# Patient Record
Sex: FEMALE | Race: WHITE | HISPANIC OR LATINO | Employment: OTHER | URBAN - METROPOLITAN AREA
[De-identification: names, ages, dates, MRNs, and addresses within clinical notes are randomized per-mention and may not be internally consistent; named-entity substitution may affect disease eponyms.]

---

## 2020-11-03 PROBLEM — E66.01 MORBID OBESITY: Status: ACTIVE | Noted: 2020-11-03

## 2021-02-04 PROBLEM — F41.1 GAD (GENERALIZED ANXIETY DISORDER): Status: ACTIVE | Noted: 2021-02-04

## 2021-02-04 PROBLEM — E03.9 HYPOTHYROIDISM: Status: ACTIVE | Noted: 2021-02-04

## 2021-02-04 PROBLEM — I25.10 CORONARY ARTERY DISEASE INVOLVING NATIVE CORONARY ARTERY OF NATIVE HEART WITHOUT ANGINA PECTORIS: Status: ACTIVE | Noted: 2021-02-04

## 2021-02-04 PROBLEM — M85.80 OSTEOPENIA: Status: ACTIVE | Noted: 2021-02-04

## 2021-02-04 PROBLEM — E78.5 DYSLIPIDEMIA: Status: ACTIVE | Noted: 2021-02-04

## 2021-06-23 DIAGNOSIS — M25.562 LEFT KNEE PAIN, UNSPECIFIED CHRONICITY: Primary | ICD-10-CM

## 2021-06-24 ENCOUNTER — OFFICE VISIT (OUTPATIENT)
Dept: ORTHOPEDICS | Facility: CLINIC | Age: 66
End: 2021-06-24
Payer: MEDICARE

## 2021-06-24 ENCOUNTER — HOSPITAL ENCOUNTER (OUTPATIENT)
Dept: RADIOLOGY | Facility: HOSPITAL | Age: 66
Discharge: HOME | End: 2021-06-24
Attending: ORTHOPAEDIC SURGERY
Payer: MEDICARE

## 2021-06-24 VITALS — HEIGHT: 64 IN | WEIGHT: 253 LBS | BODY MASS INDEX: 43.19 KG/M2

## 2021-06-24 DIAGNOSIS — M25.562 LEFT KNEE PAIN, UNSPECIFIED CHRONICITY: ICD-10-CM

## 2021-06-24 DIAGNOSIS — M25.361 KNEE INSTABILITY, RIGHT: ICD-10-CM

## 2021-06-24 DIAGNOSIS — M17.12 PRIMARY OSTEOARTHRITIS OF LEFT KNEE: Primary | ICD-10-CM

## 2021-06-24 PROCEDURE — 73562 X-RAY EXAM OF KNEE 3: CPT | Mod: 26,LT,, | Performed by: RADIOLOGY

## 2021-06-24 PROCEDURE — 73562 XR KNEE ORTHO LEFT: ICD-10-PCS | Mod: 26,LT,, | Performed by: RADIOLOGY

## 2021-06-24 PROCEDURE — 73560 X-RAY EXAM OF KNEE 1 OR 2: CPT | Mod: 26,RT,, | Performed by: RADIOLOGY

## 2021-06-24 PROCEDURE — 99204 OFFICE O/P NEW MOD 45 MIN: CPT | Mod: S$PBB,25,, | Performed by: ORTHOPAEDIC SURGERY

## 2021-06-24 PROCEDURE — 99204 PR OFFICE/OUTPT VISIT, NEW, LEVL IV, 45-59 MIN: ICD-10-PCS | Mod: S$PBB,25,, | Performed by: ORTHOPAEDIC SURGERY

## 2021-06-24 PROCEDURE — 73560 X-RAY EXAM OF KNEE 1 OR 2: CPT | Mod: TC,RT,59,PO

## 2021-06-24 PROCEDURE — 99999 PR PBB SHADOW E&M-EST. PATIENT-LVL III: ICD-10-PCS | Mod: PBBFAC,,, | Performed by: ORTHOPAEDIC SURGERY

## 2021-06-24 PROCEDURE — 99213 OFFICE O/P EST LOW 20 MIN: CPT | Mod: PBBFAC,25,PN | Performed by: ORTHOPAEDIC SURGERY

## 2021-06-24 PROCEDURE — 73560 XR KNEE ORTHO LEFT: ICD-10-PCS | Mod: 26,RT,, | Performed by: RADIOLOGY

## 2021-06-24 PROCEDURE — 99999 PR PBB SHADOW E&M-EST. PATIENT-LVL III: CPT | Mod: PBBFAC,,, | Performed by: ORTHOPAEDIC SURGERY

## 2021-06-24 PROCEDURE — 20610 DRAIN/INJ JOINT/BURSA W/O US: CPT | Mod: PBBFAC,PN | Performed by: ORTHOPAEDIC SURGERY

## 2021-06-24 PROCEDURE — 20610 LARGE JOINT ASPIRATION/INJECTION: L KNEE: ICD-10-PCS | Mod: S$PBB,LT,, | Performed by: ORTHOPAEDIC SURGERY

## 2021-06-24 RX ORDER — TRIAMCINOLONE ACETONIDE 40 MG/ML
40 INJECTION, SUSPENSION INTRA-ARTICULAR; INTRAMUSCULAR
Status: DISCONTINUED | OUTPATIENT
Start: 2021-06-24 | End: 2021-06-24 | Stop reason: HOSPADM

## 2021-06-24 RX ADMIN — TRIAMCINOLONE ACETONIDE 40 MG: 40 INJECTION, SUSPENSION INTRA-ARTICULAR; INTRAMUSCULAR at 01:06

## 2021-10-21 ENCOUNTER — OFFICE VISIT (OUTPATIENT)
Dept: CARDIOLOGY | Facility: CLINIC | Age: 66
End: 2021-10-21
Payer: MEDICARE

## 2021-10-21 VITALS
BODY MASS INDEX: 46.17 KG/M2 | SYSTOLIC BLOOD PRESSURE: 141 MMHG | WEIGHT: 268.94 LBS | DIASTOLIC BLOOD PRESSURE: 80 MMHG | HEART RATE: 67 BPM

## 2021-10-21 DIAGNOSIS — E66.01 MORBID OBESITY: ICD-10-CM

## 2021-10-21 DIAGNOSIS — I25.10 CORONARY ARTERY DISEASE INVOLVING NATIVE CORONARY ARTERY OF NATIVE HEART WITHOUT ANGINA PECTORIS: ICD-10-CM

## 2021-10-21 DIAGNOSIS — I20.9 ANGINA PECTORIS: Primary | ICD-10-CM

## 2021-10-21 DIAGNOSIS — I10 PRIMARY HYPERTENSION: ICD-10-CM

## 2021-10-21 DIAGNOSIS — E78.5 HYPERLIPIDEMIA, UNSPECIFIED HYPERLIPIDEMIA TYPE: ICD-10-CM

## 2021-10-21 PROCEDURE — 99204 OFFICE O/P NEW MOD 45 MIN: CPT | Mod: S$PBB,25,, | Performed by: INTERNAL MEDICINE

## 2021-10-21 PROCEDURE — 93005 ELECTROCARDIOGRAM TRACING: CPT | Mod: PO

## 2021-10-21 PROCEDURE — 99999 PR PBB SHADOW E&M-EST. PATIENT-LVL IV: ICD-10-PCS | Mod: PBBFAC,,, | Performed by: INTERNAL MEDICINE

## 2021-10-21 PROCEDURE — 99214 OFFICE O/P EST MOD 30 MIN: CPT | Mod: PBBFAC,PO | Performed by: INTERNAL MEDICINE

## 2021-10-21 PROCEDURE — 99999 PR PBB SHADOW E&M-EST. PATIENT-LVL IV: CPT | Mod: PBBFAC,,, | Performed by: INTERNAL MEDICINE

## 2021-10-21 PROCEDURE — 99204 PR OFFICE/OUTPT VISIT, NEW, LEVL IV, 45-59 MIN: ICD-10-PCS | Mod: S$PBB,25,, | Performed by: INTERNAL MEDICINE

## 2021-10-21 PROCEDURE — 93010 ELECTROCARDIOGRAM REPORT: CPT | Mod: ,,, | Performed by: INTERNAL MEDICINE

## 2021-10-21 PROCEDURE — 93010 EKG 12-LEAD: ICD-10-PCS | Mod: ,,, | Performed by: INTERNAL MEDICINE

## 2021-10-21 RX ORDER — NITROGLYCERIN 400 UG/1
2 SPRAY ORAL EVERY 5 MIN PRN
Qty: 0.004 G | Refills: 12 | Status: SHIPPED | OUTPATIENT
Start: 2021-10-21 | End: 2023-05-10 | Stop reason: SDUPTHER

## 2021-10-28 ENCOUNTER — HOSPITAL ENCOUNTER (OUTPATIENT)
Dept: RADIOLOGY | Facility: HOSPITAL | Age: 66
Discharge: HOME | End: 2021-10-28
Attending: INTERNAL MEDICINE
Payer: MEDICARE

## 2021-10-28 ENCOUNTER — CLINICAL SUPPORT (OUTPATIENT)
Dept: CARDIOLOGY | Facility: HOSPITAL | Age: 66
End: 2021-10-28
Attending: INTERNAL MEDICINE
Payer: MEDICARE

## 2021-10-28 VITALS — HEIGHT: 64 IN | WEIGHT: 268 LBS | BODY MASS INDEX: 45.75 KG/M2

## 2021-10-28 DIAGNOSIS — I20.9 ANGINA PECTORIS: ICD-10-CM

## 2021-10-28 LAB
CV PHARM DOSE: 0.4 MG
CV STRESS BASE HR: 69 BPM
DIASTOLIC BLOOD PRESSURE: 113 MMHG
OHS CV CPX 1 MINUTE RECOVERY HEART RATE: 84 BPM
OHS CV CPX 85 PERCENT MAX PREDICTED HEART RATE MALE: 126
OHS CV CPX MAX PREDICTED HEART RATE: 148
OHS CV CPX PATIENT IS FEMALE: 1
OHS CV CPX PATIENT IS MALE: 0
OHS CV CPX PEAK DIASTOLIC BLOOD PRESSURE: 67 MMHG
OHS CV CPX PEAK HEAR RATE: 95 BPM
OHS CV CPX PEAK RATE PRESSURE PRODUCT: NORMAL
OHS CV CPX PEAK SYSTOLIC BLOOD PRESSURE: 194 MMHG
OHS CV CPX PERCENT MAX PREDICTED HEART RATE ACHIEVED: 64
OHS CV CPX RATE PRESSURE PRODUCT PRESENTING: NORMAL
OHS CV PHARM TIME: 952 MIN
SYSTOLIC BLOOD PRESSURE: 184 MMHG

## 2021-10-28 PROCEDURE — 93018 CV STRESS TEST I&R ONLY: CPT | Mod: ,,, | Performed by: INTERNAL MEDICINE

## 2021-10-28 PROCEDURE — A9502 TC99M TETROFOSMIN: HCPCS | Mod: PO

## 2021-10-28 PROCEDURE — 78452 STRESS TEST WITH MYOCARDIAL PERFUSION (CUPID ONLY): ICD-10-PCS | Mod: 26,,, | Performed by: INTERNAL MEDICINE

## 2021-10-28 PROCEDURE — 93017 CV STRESS TEST TRACING ONLY: CPT | Mod: PO

## 2021-10-28 PROCEDURE — 93018 PR CARDIAC STRESS TST,INTERP/REPT ONLY: ICD-10-PCS | Mod: ,,, | Performed by: INTERNAL MEDICINE

## 2021-10-28 PROCEDURE — 93016 STRESS TEST WITH MYOCARDIAL PERFUSION (CUPID ONLY): ICD-10-PCS | Mod: ,,, | Performed by: INTERNAL MEDICINE

## 2021-10-28 PROCEDURE — 63600175 PHARM REV CODE 636 W HCPCS: Mod: PO | Performed by: INTERNAL MEDICINE

## 2021-10-28 PROCEDURE — 78452 HT MUSCLE IMAGE SPECT MULT: CPT | Mod: PO

## 2021-10-28 PROCEDURE — 78452 HT MUSCLE IMAGE SPECT MULT: CPT | Mod: 26,,, | Performed by: INTERNAL MEDICINE

## 2021-10-28 PROCEDURE — 93016 CV STRESS TEST SUPVJ ONLY: CPT | Mod: ,,, | Performed by: INTERNAL MEDICINE

## 2021-10-28 RX ORDER — REGADENOSON 0.08 MG/ML
0.4 INJECTION, SOLUTION INTRAVENOUS ONCE
Status: COMPLETED | OUTPATIENT
Start: 2021-10-28 | End: 2021-10-28

## 2021-10-28 RX ADMIN — REGADENOSON 0.4 MG: 0.08 INJECTION, SOLUTION INTRAVENOUS at 09:10

## 2021-10-29 ENCOUNTER — TELEPHONE (OUTPATIENT)
Dept: CARDIOLOGY | Facility: CLINIC | Age: 66
End: 2021-10-29
Payer: MEDICARE

## 2021-10-29 ENCOUNTER — PATIENT MESSAGE (OUTPATIENT)
Dept: CARDIOLOGY | Facility: CLINIC | Age: 66
End: 2021-10-29
Payer: MEDICARE

## 2021-11-21 ENCOUNTER — PATIENT MESSAGE (OUTPATIENT)
Dept: CARDIOLOGY | Facility: CLINIC | Age: 66
End: 2021-11-21
Payer: MEDICARE

## 2021-12-13 ENCOUNTER — CLINICAL SUPPORT (OUTPATIENT)
Dept: CARDIOLOGY | Facility: HOSPITAL | Age: 66
End: 2021-12-13
Attending: INTERNAL MEDICINE
Payer: MEDICARE

## 2021-12-13 VITALS — BODY MASS INDEX: 45.75 KG/M2 | HEART RATE: 70 BPM | WEIGHT: 268 LBS | HEIGHT: 64 IN

## 2021-12-13 PROCEDURE — 93306 TTE W/DOPPLER COMPLETE: CPT | Mod: 26,,, | Performed by: INTERNAL MEDICINE

## 2021-12-13 PROCEDURE — 93306 ECHO (CUPID ONLY): ICD-10-PCS | Mod: 26,,, | Performed by: INTERNAL MEDICINE

## 2021-12-13 PROCEDURE — 93306 TTE W/DOPPLER COMPLETE: CPT | Mod: PO

## 2021-12-14 ENCOUNTER — TELEPHONE (OUTPATIENT)
Dept: CARDIOLOGY | Facility: CLINIC | Age: 66
End: 2021-12-14
Payer: MEDICARE

## 2021-12-14 LAB
ASCENDING AORTA: 2.11 CM
AV INDEX (PROSTH): 0.43
AV MEAN GRADIENT: 12 MMHG
AV PEAK GRADIENT: 24 MMHG
AV VALVE AREA: 1.33 CM2
AV VELOCITY RATIO: 0.4
BSA FOR ECHO PROCEDURE: 2.34 M2
CV ECHO LV RWT: 0.38 CM
DOP CALC AO PEAK VEL: 2.47 M/S
DOP CALC AO VTI: 64.11 CM
DOP CALC LVOT AREA: 3.1 CM2
DOP CALC LVOT DIAMETER: 1.99 CM
DOP CALC LVOT PEAK VEL: 1 M/S
DOP CALC LVOT STROKE VOLUME: 85.15 CM3
DOP CALCLVOT PEAK VEL VTI: 27.39 CM
E WAVE DECELERATION TIME: 237.82 MSEC
E/A RATIO: 1.34
E/E' RATIO: 16.63 M/S
ECHO LV POSTERIOR WALL: 0.81 CM (ref 0.6–1.1)
EJECTION FRACTION: 70 %
FRACTIONAL SHORTENING: 46 % (ref 28–44)
INTERVENTRICULAR SEPTUM: 0.82 CM (ref 0.6–1.1)
IVRT: 117.03 MSEC
LA MAJOR: 4.71 CM
LA MINOR: 4.68 CM
LA WIDTH: 3.44 CM
LEFT ATRIUM SIZE: 3.41 CM
LEFT ATRIUM VOLUME INDEX: 21.1 ML/M2
LEFT ATRIUM VOLUME: 46.81 CM3
LEFT INTERNAL DIMENSION IN SYSTOLE: 2.32 CM (ref 2.1–4)
LEFT VENTRICLE DIASTOLIC VOLUME INDEX: 37.17 ML/M2
LEFT VENTRICLE DIASTOLIC VOLUME: 82.52 ML
LEFT VENTRICLE MASS INDEX: 48 G/M2
LEFT VENTRICLE SYSTOLIC VOLUME INDEX: 8.4 ML/M2
LEFT VENTRICLE SYSTOLIC VOLUME: 18.59 ML
LEFT VENTRICULAR INTERNAL DIMENSION IN DIASTOLE: 4.29 CM (ref 3.5–6)
LEFT VENTRICULAR MASS: 107.53 G
LV LATERAL E/E' RATIO: 14.78 M/S
LV SEPTAL E/E' RATIO: 19 M/S
MV A" WAVE DURATION": 11.7 MSEC
MV PEAK A VEL: 0.99 M/S
MV PEAK E VEL: 1.33 M/S
MV STENOSIS PRESSURE HALF TIME: 68.97 MS
MV VALVE AREA P 1/2 METHOD: 3.19 CM2
PISA MRMAX VEL: 0.05 M/S
PISA TR MAX VEL: 2.89 M/S
PULM VEIN S/D RATIO: 0.97
PV PEAK D VEL: 0.69 M/S
PV PEAK S VEL: 0.67 M/S
RA MAJOR: 4.22 CM
RA PRESSURE: 3 MMHG
RA WIDTH: 2.76 CM
RIGHT VENTRICULAR END-DIASTOLIC DIMENSION: 3.48 CM
RV TISSUE DOPPLER FREE WALL SYSTOLIC VELOCITY 1 (APICAL 4 CHAMBER VIEW): 8.66 CM/S
SINUS: 2.25 CM
STJ: 1.84 CM
TDI LATERAL: 0.09 M/S
TDI SEPTAL: 0.07 M/S
TDI: 0.08 M/S
TR MAX PG: 33 MMHG
TRICUSPID ANNULAR PLANE SYSTOLIC EXCURSION: 1.99 CM
TV REST PULMONARY ARTERY PRESSURE: 36 MMHG

## 2022-01-10 DIAGNOSIS — M25.551 RIGHT HIP PAIN: Primary | ICD-10-CM

## 2022-01-12 ENCOUNTER — TELEPHONE (OUTPATIENT)
Dept: ORTHOPEDICS | Facility: CLINIC | Age: 67
End: 2022-01-12
Payer: MEDICARE

## 2022-01-12 ENCOUNTER — OFFICE VISIT (OUTPATIENT)
Dept: ORTHOPEDICS | Facility: CLINIC | Age: 67
End: 2022-01-12
Payer: MEDICARE

## 2022-01-12 ENCOUNTER — HOSPITAL ENCOUNTER (OUTPATIENT)
Dept: RADIOLOGY | Facility: HOSPITAL | Age: 67
Discharge: HOME | End: 2022-01-12
Attending: ORTHOPAEDIC SURGERY
Payer: MEDICARE

## 2022-01-12 VITALS
SYSTOLIC BLOOD PRESSURE: 162 MMHG | HEIGHT: 64 IN | WEIGHT: 268.06 LBS | DIASTOLIC BLOOD PRESSURE: 83 MMHG | HEART RATE: 72 BPM | BODY MASS INDEX: 45.76 KG/M2

## 2022-01-12 DIAGNOSIS — M18.0 PRIMARY ARTHROSIS OF FIRST CARPOMETACARPAL JOINTS, BILATERAL: Primary | ICD-10-CM

## 2022-01-12 DIAGNOSIS — M79.642 LEFT HAND PAIN: ICD-10-CM

## 2022-01-12 DIAGNOSIS — M79.642 LEFT HAND PAIN: Primary | ICD-10-CM

## 2022-01-12 PROCEDURE — 99204 PR OFFICE/OUTPT VISIT, NEW, LEVL IV, 45-59 MIN: ICD-10-PCS | Mod: 25,S$PBB,, | Performed by: ORTHOPAEDIC SURGERY

## 2022-01-12 PROCEDURE — 73130 X-RAY EXAM OF HAND: CPT | Mod: TC,50,PO

## 2022-01-12 PROCEDURE — 99204 OFFICE O/P NEW MOD 45 MIN: CPT | Mod: 25,S$PBB,, | Performed by: ORTHOPAEDIC SURGERY

## 2022-01-12 PROCEDURE — 99214 OFFICE O/P EST MOD 30 MIN: CPT | Mod: PBBFAC,PN,25 | Performed by: ORTHOPAEDIC SURGERY

## 2022-01-12 PROCEDURE — 20600 DRAIN/INJ JOINT/BURSA W/O US: CPT | Mod: PBBFAC,50 | Performed by: ORTHOPAEDIC SURGERY

## 2022-01-12 PROCEDURE — 73130 XR HAND COMPLETE 3 VIEWS BILATERAL: ICD-10-PCS | Mod: 26,50,, | Performed by: RADIOLOGY

## 2022-01-12 PROCEDURE — 20600 PR DRAIN/INJECT SMALL JOINT/BURSA: ICD-10-PCS | Mod: 50,S$PBB,, | Performed by: ORTHOPAEDIC SURGERY

## 2022-01-12 PROCEDURE — 99999 PR PBB SHADOW E&M-EST. PATIENT-LVL IV: ICD-10-PCS | Mod: PBBFAC,,, | Performed by: ORTHOPAEDIC SURGERY

## 2022-01-12 PROCEDURE — 99999 PR PBB SHADOW E&M-EST. PATIENT-LVL IV: CPT | Mod: PBBFAC,,, | Performed by: ORTHOPAEDIC SURGERY

## 2022-01-12 PROCEDURE — 20600 DRAIN/INJ JOINT/BURSA W/O US: CPT | Mod: 50,S$PBB,, | Performed by: ORTHOPAEDIC SURGERY

## 2022-01-12 PROCEDURE — 73130 X-RAY EXAM OF HAND: CPT | Mod: 26,50,, | Performed by: RADIOLOGY

## 2022-01-12 RX ORDER — TRIAMCINOLONE ACETONIDE 40 MG/ML
40 INJECTION, SUSPENSION INTRA-ARTICULAR; INTRAMUSCULAR
Status: DISCONTINUED | OUTPATIENT
Start: 2022-01-12 | End: 2022-01-12 | Stop reason: HOSPADM

## 2022-01-12 RX ADMIN — TRIAMCINOLONE ACETONIDE 40 MG: 40 INJECTION, SUSPENSION INTRA-ARTICULAR; INTRAMUSCULAR at 04:01

## 2022-01-12 NOTE — TELEPHONE ENCOUNTER
Called pt to reschedule 2pm appt with Jules because he will be out of clinic. Can reschedule with  today at 4:40 or reschedule to Yo next available appt. No answer. LVM. Thanks, Karla

## 2022-01-12 NOTE — PROCEDURES
Small Joint Aspiration/Injection    Date/Time: 1/12/2022 4:40 PM  Performed by: Phoenix Stauffer MD  Authorized by: Phoenix Stauffer MD     Consent Done?:  Yes (Verbal)  Indications:  Pain  Site marked: the procedure site was marked    Timeout: prior to procedure the correct patient, procedure, and site was verified    Prep: patient was prepped and draped in usual sterile fashion      Local anesthesia used?: No    Location:  Thumb  Thumb joint: Right thumb CMC joint.  Ultrasonic guidance for needle placement?: No    Medications:  40 mg triamcinolone acetonide 40 mg/mL  Patient tolerance:  Patient tolerated the procedure well with no immediate complications

## 2022-01-12 NOTE — PROCEDURES
Small Joint Aspiration/Injection    Date/Time: 1/12/2022 4:40 PM  Performed by: Phoenix Stauffer MD  Authorized by: Phoenix Stauffer MD     Consent Done?:  Yes (Verbal)  Indications:  Pain  Site marked: the procedure site was marked    Timeout: prior to procedure the correct patient, procedure, and site was verified    Local anesthetic:  Topical anesthetic  Location:  Thumb  Thumb joint: Left thumb CMC joint.  Ultrasonic guidance for needle placement?: No    Needle size:  25 G  Medications:  40 mg triamcinolone acetonide 40 mg/mL; 40 mg triamcinolone acetonide 40 mg/mL (20 mg injected)  Patient tolerance:  Patient tolerated the procedure well with no immediate complications

## 2022-01-12 NOTE — PROGRESS NOTES
1/12/2022    Chief Complaint:  Chief Complaint   Patient presents with    Left Hand - Pain     [place] hand pain    Right Hand - Pain       HPI:  Melissa Casas is a 66 y.o. female, who presents to clinic today she has a history of pain at the base of both of her thumbs.  She states that this been going on for months.  She states that it is gradually worsening.  States that she has had some x-rays.  She has been told in the past that she has de Quervain tendinitis.  She is here today for evaluation and treatment options.    PMHX:  Past Medical History:   Diagnosis Date    Anxiety     Asthma     CAD (coronary artery disease)     Chronic pain     Coronary artery disease     Hyperlipidemia     Osteopenia     Pneumonia     Primary hypertension     Primary osteoarthritis of left knee 6/24/2021    Thyroid disease        PSHX:  Past Surgical History:   Procedure Laterality Date    CORONARY ARTERY BYPASS GRAFT      double     JOINT REPLACEMENT Right     total    JOINT REPLACEMENT Left     patital        FMHX:  Family History   Problem Relation Age of Onset    Atrial fibrillation Mother     Thyroid disease Mother     Osteoporosis Mother     Anxiety disorder Mother     Heart disease Father     Anxiety disorder Father     Mental illness Maternal Uncle         schizophrenia     Anxiety disorder Maternal Grandmother     Ulcers Maternal Grandmother     Cancer Paternal Aunt     Diabetes Paternal Aunt        SOCHX:  Social History     Tobacco Use    Smoking status: Never Smoker    Smokeless tobacco: Never Used   Substance Use Topics    Alcohol use: Not Currently       ALLERGIES:  Compazine [prochlorperazine edisylate]    CURRENT MEDICATIONS:  Current Outpatient Medications on File Prior to Visit   Medication Sig Dispense Refill    ALPRAZolam (XANAX) 0.5 MG tablet Take 1 tablet (0.5 mg total) by mouth 2 (two) times daily as needed for Anxiety. 60 tablet 2    ascorbic acid, vitamin C, (VITAMIN C) 500 MG  tablet Take 500 mg by mouth once daily.      atorvastatin (LIPITOR) 80 MG tablet Take 1 tablet (80 mg total) by mouth once daily. 90 tablet 0    calcium carbonate (OS-RAY) 600 mg calcium (1,500 mg) Tab Take 600 mg by mouth once.      cholecalciferol, vitamin D3, 1,250 mcg (50,000 unit) capsule Take 50,000 Units by mouth once daily.      COLLAGEN MISC Take 5,000 mg by mouth 2 (two) times a day.      diclofenac sodium (VOLTAREN) 1 % Gel Apply 2 g topically once daily.      dicyclomine (BENTYL) 10 MG capsule Take 1 capsule (10 mg total) by mouth 3 (three) times daily as needed (cramping). 30 capsule 3    diltiaZEM (CARDIZEM CD) 120 MG Cp24 Take 1 capsule (120 mg total) by mouth once daily. 90 capsule 3    estradioL (VAGIFEM) 10 mcg Tab Place 1 tablet (10 mcg total) vaginally twice a week. 24 tablet 3    fluticasone furoate-vilanteroL (BREO ELLIPTA) 100-25 mcg/dose diskus inhaler Inhale 1 puff into the lungs once daily. Controller 3 each 3    fluticasone propionate (FLONASE) 50 mcg/actuation nasal spray 1 spray (50 mcg total) by Each Nostril route 2 (two) times a day. 48 g 3    levocetirizine (XYZAL) 5 MG tablet Take 5 mg by mouth every evening.      levothyroxine (SYNTHROID) 150 MCG tablet Take 150 mcg by mouth before breakfast.      magnesium oxide 500 mg Tab Take 1 tablet by mouth once daily.       montelukast (SINGULAIR) 10 mg tablet Take 1 tablet (10 mg total) by mouth every evening. 90 tablet 3    multivitamin (THERAGRAN) per tablet Take 1 tablet by mouth once daily.      nitroGLYCERIN 0.4 MG/DOSE TL SPRY (NITROLINGUAL) 400 mcg/spray spray Place 2 sprays under the tongue every 5 (five) minutes as needed for Chest pain. 0.004 g 12    raloxifene (EVISTA) 60 mg tablet Take 1 tablet (60 mg total) by mouth once daily. 90 tablet 3    ramipriL (ALTACE) 5 MG capsule Take 1 capsule (5 mg total) by mouth once daily. 90 capsule 3    ranolazine (RANEXA) 500 MG Tb12 Take 2 tablets (1,000 mg total) by mouth 2  "(two) times a day. 180 tablet 3     No current facility-administered medications on file prior to visit.       REVIEW OF SYSTEMS:  Review of Systems   Musculoskeletal: Positive for joint pain and neck pain.   Neurological: Negative for tingling and weakness.       GENERAL PHYSICAL EXAM:   BP (!) 162/83   Pulse 72   Ht 5' 4" (1.626 m)   Wt 121.6 kg (268 lb 1.3 oz)   BMI 46.02 kg/m²    GEN: well developed, well nourished, no acute distress   HENT: Normocephalic, atraumatic   EYES: No discharge, conjunctiva normal   NECK: Supple, non-tender   PULM: No wheezing, no respiratory distress   CV: RRR   ABD: Soft, non-tender    ORTHO EXAM:   Examination of bilateral hands reveals that there is no edema.  There are no major skin changes.  Palpation produces severe tenderness over the CMC joints bilaterally.  She has positive grind test bilaterally.  She has 2+ radial pulses.  Sensation is grossly intact in the median radial ulnar distributions.    RADIOLOGY:   X-rays of bilateral hands from 01/12/2022 have been reviewed.  She is noted to have severe degenerative changes about the CMC joints of both the right and the left thumbs.  There are mild degenerative changes about multiple other joints    ASSESSMENT:   Bilateral thumb CMC arthritis    PLAN:  1. I did discuss treatment options with the patient.  She states that she does not do well with oral anti-inflammatories.  I have discussed the possibility of steroid injections.  After discussion the risks and benefits of those injections I did place steroid injections to both the right and left thumb CMC joints.  The patient tolerated these well.    2. Follow up with me on a p.r.n. basis  Answers for HPI/ROS submitted by the patient on 1/12/2022  unexpected weight change: No  appetite change : No  sleep disturbance: No  IMMUNOCOMPROMISED: No  dysphoric mood: No  visual disturbance: No  sinus pressure : No  food allergies: No  difficulty urinating: No  painful intercourse: " No  numbness: No  joint swelling: No  Pain Chronicity: chronic  History of trauma: No  Onset: more than 1 month ago  Frequency: constantly  Progression since onset: unchanged  Injury mechanism: lifting  injury location: at home  pain- numeric: 10/10  pain location: left wrist, left fingers, other  pain quality: aching, sharp, shooting  Radiating Pain: No  Aggravating factors: activity, bearing weight, lifting, lying down, touching  inability to bear weight: Yes  joint locking: No  limited range of motion: Yes  stiffness: No  Treatments tried: OTC ointments, OTC pain meds, rest  physical therapy: not tried  Improvement on treatment: no relief

## 2022-01-12 NOTE — TELEPHONE ENCOUNTER
Called pt to reschedule today's appt with Jules. R/s to see  at 4:40pm. Pt verbalized understanding. ThanksKarla

## 2022-01-13 ENCOUNTER — OFFICE VISIT (OUTPATIENT)
Dept: ORTHOPEDICS | Facility: CLINIC | Age: 67
End: 2022-01-13
Payer: MEDICARE

## 2022-01-13 ENCOUNTER — HOSPITAL ENCOUNTER (OUTPATIENT)
Dept: RADIOLOGY | Facility: HOSPITAL | Age: 67
Discharge: HOME | End: 2022-01-13
Attending: ORTHOPAEDIC SURGERY
Payer: MEDICARE

## 2022-01-13 VITALS — HEIGHT: 64 IN | BODY MASS INDEX: 45.75 KG/M2 | WEIGHT: 268 LBS

## 2022-01-13 DIAGNOSIS — M70.61 GREATER TROCHANTERIC BURSITIS OF RIGHT HIP: ICD-10-CM

## 2022-01-13 DIAGNOSIS — M17.12 PRIMARY OSTEOARTHRITIS OF LEFT KNEE: ICD-10-CM

## 2022-01-13 DIAGNOSIS — M17.11 PRIMARY OSTEOARTHRITIS OF RIGHT KNEE: Primary | ICD-10-CM

## 2022-01-13 DIAGNOSIS — M25.551 RIGHT HIP PAIN: ICD-10-CM

## 2022-01-13 PROCEDURE — 99213 OFFICE O/P EST LOW 20 MIN: CPT | Mod: S$PBB,25,, | Performed by: ORTHOPAEDIC SURGERY

## 2022-01-13 PROCEDURE — 99999 PR PBB SHADOW E&M-EST. PATIENT-LVL III: ICD-10-PCS | Mod: PBBFAC,,, | Performed by: ORTHOPAEDIC SURGERY

## 2022-01-13 PROCEDURE — 73502 XR HIP WITH PELVIS WHEN PERFORMED, 2 OR 3  VIEWS RIGHT: ICD-10-PCS | Mod: 26,RT,, | Performed by: RADIOLOGY

## 2022-01-13 PROCEDURE — 99999 PR PBB SHADOW E&M-EST. PATIENT-LVL III: CPT | Mod: PBBFAC,,, | Performed by: ORTHOPAEDIC SURGERY

## 2022-01-13 PROCEDURE — 20610 DRAIN/INJ JOINT/BURSA W/O US: CPT | Mod: 50,PBBFAC,PN | Performed by: ORTHOPAEDIC SURGERY

## 2022-01-13 PROCEDURE — 99213 OFFICE O/P EST LOW 20 MIN: CPT | Mod: PBBFAC,PN,25 | Performed by: ORTHOPAEDIC SURGERY

## 2022-01-13 PROCEDURE — 20610 DRAIN/INJ JOINT/BURSA W/O US: CPT | Mod: PBBFAC,PN,RT | Performed by: ORTHOPAEDIC SURGERY

## 2022-01-13 PROCEDURE — 99213 PR OFFICE/OUTPT VISIT, EST, LEVL III, 20-29 MIN: ICD-10-PCS | Mod: S$PBB,25,, | Performed by: ORTHOPAEDIC SURGERY

## 2022-01-13 PROCEDURE — 73502 X-RAY EXAM HIP UNI 2-3 VIEWS: CPT | Mod: TC,PO,RT

## 2022-01-13 PROCEDURE — 73502 X-RAY EXAM HIP UNI 2-3 VIEWS: CPT | Mod: 26,RT,, | Performed by: RADIOLOGY

## 2022-01-13 PROCEDURE — 20610 LARGE JOINT ASPIRATION/INJECTION: R GREATER TROCHANTERIC BURSA: ICD-10-PCS | Mod: S$PBB,51,RT, | Performed by: ORTHOPAEDIC SURGERY

## 2022-01-13 RX ORDER — TRIAMCINOLONE ACETONIDE 40 MG/ML
40 INJECTION, SUSPENSION INTRA-ARTICULAR; INTRAMUSCULAR
Status: DISCONTINUED | OUTPATIENT
Start: 2022-01-13 | End: 2022-01-13 | Stop reason: HOSPADM

## 2022-01-13 RX ADMIN — TRIAMCINOLONE ACETONIDE 40 MG: 40 INJECTION, SUSPENSION INTRA-ARTICULAR; INTRAMUSCULAR at 01:01

## 2022-01-13 NOTE — PROCEDURES
Large Joint Aspiration/Injection: bilateral knee    Date/Time: 1/13/2022 1:00 PM  Performed by: Baljit Cooley MD  Authorized by: Baljit Cooley MD     Consent Done?:  Yes (Verbal)  Indications:  Pain  Timeout: prior to procedure the correct patient, procedure, and site was verified    Prep: patient was prepped and draped in usual sterile fashion    Local anesthetic:  Lidocaine 1% without epinephrine  Anesthetic total (ml):  5      Details:  Needle Size:  21 G  Approach:  Anterolateral  Location:  Knee  Laterality:  Bilateral  Site:  Bilateral knee  Medications (Right):  40 mg triamcinolone acetonide 40 mg/mL  Medications (Left):  40 mg triamcinolone acetonide 40 mg/mL  Patient tolerance:  Patient tolerated the procedure well with no immediate complications  Large Joint Aspiration/Injection: R greater trochanteric bursa    Date/Time: 1/13/2022 1:00 PM  Performed by: Baljit Cooley MD  Authorized by: Baljit Cooley MD     Consent Done?:  Yes (Verbal)  Indications:  Pain  Timeout: prior to procedure the correct patient, procedure, and site was verified    Prep: patient was prepped and draped in usual sterile fashion      Local anesthesia used?: Yes    Local anesthetic:  Lidocaine 1% without epinephrine  Anesthetic total (ml):  5      Details:  Needle Size:  22 G  Approach:  Lateral  Location:  Hip  Site:  R greater trochanteric bursa  Medications:  40 mg triamcinolone acetonide 40 mg/mL  Patient tolerance:  Patient tolerated the procedure well with no immediate complications

## 2022-01-13 NOTE — PROGRESS NOTES
Chief Complaint   Patient presents with    Right Hip - Pain         HPI:   This is a 66 y.o. who presents to clinic today complaining of bilateral knee and right hip pain for 3 weeks after no known trauma. Pain is progressively worsening. No numbness or tingling. No associated signs or symptoms.    Past Medical History:   Diagnosis Date    Anxiety     Asthma     CAD (coronary artery disease)     Chronic pain     Coronary artery disease     Hyperlipidemia     Osteopenia     Pneumonia     Primary hypertension     Primary osteoarthritis of left knee 6/24/2021    Thyroid disease      Past Surgical History:   Procedure Laterality Date    CORONARY ARTERY BYPASS GRAFT      double     JOINT REPLACEMENT Right     total    JOINT REPLACEMENT Left     patital      Current Outpatient Medications on File Prior to Visit   Medication Sig Dispense Refill    ALPRAZolam (XANAX) 0.5 MG tablet Take 1 tablet (0.5 mg total) by mouth 2 (two) times daily as needed for Anxiety. 60 tablet 2    ascorbic acid, vitamin C, (VITAMIN C) 500 MG tablet Take 500 mg by mouth once daily.      atorvastatin (LIPITOR) 80 MG tablet Take 1 tablet (80 mg total) by mouth once daily. 90 tablet 0    calcium carbonate (OS-RAY) 600 mg calcium (1,500 mg) Tab Take 600 mg by mouth once.      cholecalciferol, vitamin D3, 1,250 mcg (50,000 unit) capsule Take 50,000 Units by mouth once daily.      COLLAGEN MISC Take 5,000 mg by mouth 2 (two) times a day.      diclofenac sodium (VOLTAREN) 1 % Gel Apply 2 g topically once daily.      dicyclomine (BENTYL) 10 MG capsule Take 1 capsule (10 mg total) by mouth 3 (three) times daily as needed (cramping). 30 capsule 3    diltiaZEM (CARDIZEM CD) 120 MG Cp24 Take 1 capsule (120 mg total) by mouth once daily. 90 capsule 3    estradioL (VAGIFEM) 10 mcg Tab Place 1 tablet (10 mcg total) vaginally twice a week. 24 tablet 3    fluticasone furoate-vilanteroL (BREO ELLIPTA) 100-25 mcg/dose diskus inhaler Inhale  1 puff into the lungs once daily. Controller 3 each 3    fluticasone propionate (FLONASE) 50 mcg/actuation nasal spray 1 spray (50 mcg total) by Each Nostril route 2 (two) times a day. 48 g 3    levocetirizine (XYZAL) 5 MG tablet Take 5 mg by mouth every evening.      levothyroxine (SYNTHROID) 150 MCG tablet Take 150 mcg by mouth before breakfast.      magnesium oxide 500 mg Tab Take 1 tablet by mouth once daily.       montelukast (SINGULAIR) 10 mg tablet Take 1 tablet (10 mg total) by mouth every evening. 90 tablet 3    multivitamin (THERAGRAN) per tablet Take 1 tablet by mouth once daily.      nitroGLYCERIN 0.4 MG/DOSE TL SPRY (NITROLINGUAL) 400 mcg/spray spray Place 2 sprays under the tongue every 5 (five) minutes as needed for Chest pain. 0.004 g 12    raloxifene (EVISTA) 60 mg tablet Take 1 tablet (60 mg total) by mouth once daily. 90 tablet 3    ramipriL (ALTACE) 5 MG capsule Take 1 capsule (5 mg total) by mouth once daily. 90 capsule 3    ranolazine (RANEXA) 500 MG Tb12 Take 2 tablets (1,000 mg total) by mouth 2 (two) times a day. 180 tablet 3     Current Facility-Administered Medications on File Prior to Visit   Medication Dose Route Frequency Provider Last Rate Last Admin    [DISCONTINUED] triamcinolone acetonide injection 40 mg  40 mg Intramuscular  Phoenix Stauffer MD   40 mg at 01/12/22 1640    [DISCONTINUED] triamcinolone acetonide injection 40 mg  40 mg Intramuscular  Phoenix Stauffer MD   40 mg at 01/12/22 1640     Review of patient's allergies indicates:   Allergen Reactions    Compazine [prochlorperazine edisylate]      Family History   Problem Relation Age of Onset    Atrial fibrillation Mother     Thyroid disease Mother     Osteoporosis Mother     Anxiety disorder Mother     Heart disease Father     Anxiety disorder Father     Mental illness Maternal Uncle         schizophrenia     Anxiety disorder Maternal Grandmother     Ulcers Maternal Grandmother     Cancer  Paternal Aunt     Diabetes Paternal Aunt      Social History     Socioeconomic History    Marital status:    Tobacco Use    Smoking status: Never Smoker    Smokeless tobacco: Never Used   Substance and Sexual Activity    Alcohol use: Not Currently       Review of Systems:  Constitutional:  Denies fever or chills   Eyes:  Denies change in visual acuity   HENT:  Denies nasal congestion or sore throat   Respiratory:  Denies cough or shortness of breath   Cardiovascular:  Denies chest pain or edema   GI:  Denies abdominal pain, nausea, vomiting, bloody stools or diarrhea   :  Denies dysuria   Integument:  Denies rash   Neurologic:  Denies headache, focal weakness or sensory changes   Endocrine:  Denies polyuria or polydipsia   Lymphatic:  Denies swollen glands   Psychiatric:  Denies depression or anxiety     Physical Exam:   Constitutional:  Well developed, well nourished, no acute distress, non-toxic appearance   Integument:  Well hydrated, no rash   Lymphatic:  No lymphadenopathy noted   Neurologic:  Alert & oriented x 3, CN 2-12 normal, normal motor function, normal sensory function, no focal deficits noted   Psychiatric:  Speech and behavior appropriate   Eyes: EOMI  Gi: abdomen soft    Bilateral Knee Exam    Tenderness   The patient is experiencing tenderness in the medial joint line.    Range of Motion   Extension: abnormal   Flexion: abnormal     Muscle Strength     The patient has normal knee strength.    Tests   Emilee:  Medial - positive   Lachman:  Anterior - negative      Varus: negative  Valgus: negative  Patellar Apprehension: negative    Other   Erythema: absent  Sensation: normal  Pulse: present  Swelling: mild      Bilateral Hip Exam Performed    right Hip Exam     Tenderness   The patient is experiencing tenderness in the greater trochanter.    Range of Motion   The patient has normal hip ROM.    Muscle Strength   Abduction: 4/5     Other   Erythema: absent  Sensation: normal  Pulse:  present    left Hip Exam   Hip exam performed same as contralateral hip and is normal.   Primary osteoarthritis of right knee    Primary osteoarthritis of left knee    Greater trochanteric bursitis of right hip            Using an aseptic technique, I injected 5 cc of lidocaine 1% without and 1 cc of kenalog 40mg into the bilateral Knee and right hip. The patient tolerated this well. I will have them return to clinic in 3 months.

## 2022-02-24 ENCOUNTER — OFFICE VISIT (OUTPATIENT)
Dept: ORTHOPEDICS | Facility: CLINIC | Age: 67
End: 2022-02-24
Payer: MEDICARE

## 2022-02-24 VITALS — HEIGHT: 64 IN | BODY MASS INDEX: 45.07 KG/M2 | WEIGHT: 264 LBS

## 2022-02-24 DIAGNOSIS — M70.61 GREATER TROCHANTERIC BURSITIS OF RIGHT HIP: ICD-10-CM

## 2022-02-24 DIAGNOSIS — M17.12 PRIMARY OSTEOARTHRITIS OF LEFT KNEE: ICD-10-CM

## 2022-02-24 DIAGNOSIS — M17.11 PRIMARY OSTEOARTHRITIS OF RIGHT KNEE: Primary | ICD-10-CM

## 2022-02-24 PROCEDURE — 99999 PR PBB SHADOW E&M-EST. PATIENT-LVL III: CPT | Mod: PBBFAC,,, | Performed by: ORTHOPAEDIC SURGERY

## 2022-02-24 PROCEDURE — 99214 OFFICE O/P EST MOD 30 MIN: CPT | Mod: 25,S$PBB,, | Performed by: ORTHOPAEDIC SURGERY

## 2022-02-24 PROCEDURE — 20610 LARGE JOINT ASPIRATION/INJECTION: R GREATER TROCHANTERIC BURSA: ICD-10-PCS | Mod: S$PBB,RT,, | Performed by: ORTHOPAEDIC SURGERY

## 2022-02-24 PROCEDURE — 99214 PR OFFICE/OUTPT VISIT, EST, LEVL IV, 30-39 MIN: ICD-10-PCS | Mod: 25,S$PBB,, | Performed by: ORTHOPAEDIC SURGERY

## 2022-02-24 PROCEDURE — 99999 PR PBB SHADOW E&M-EST. PATIENT-LVL III: ICD-10-PCS | Mod: PBBFAC,,, | Performed by: ORTHOPAEDIC SURGERY

## 2022-02-24 PROCEDURE — 20610 DRAIN/INJ JOINT/BURSA W/O US: CPT | Mod: PBBFAC,PN | Performed by: ORTHOPAEDIC SURGERY

## 2022-02-24 PROCEDURE — 99213 OFFICE O/P EST LOW 20 MIN: CPT | Mod: PBBFAC,PN,25 | Performed by: ORTHOPAEDIC SURGERY

## 2022-02-24 RX ORDER — TRIAMCINOLONE ACETONIDE 40 MG/ML
40 INJECTION, SUSPENSION INTRA-ARTICULAR; INTRAMUSCULAR
Status: DISCONTINUED | OUTPATIENT
Start: 2022-02-24 | End: 2022-02-24 | Stop reason: HOSPADM

## 2022-02-24 RX ADMIN — TRIAMCINOLONE ACETONIDE 40 MG: 40 INJECTION, SUSPENSION INTRA-ARTICULAR; INTRAMUSCULAR at 01:02

## 2022-02-24 NOTE — PROGRESS NOTES
No chief complaint on file.     HPI:   This is a 66 y.o. who presents to clinic today for right hip pain for the past 2 weeks after no known trauma. Complains of pain while sleeping on side and when descending stairs. Pain is dull. No numbness or tingling. No associated signs or symptoms.      Past Medical History:   Diagnosis Date    Anxiety     Asthma     CAD (coronary artery disease)     Chronic pain     Coronary artery disease     Hyperlipidemia     Osteopenia     Pneumonia     Primary hypertension     Primary osteoarthritis of left knee 6/24/2021    Thyroid disease      Past Surgical History:   Procedure Laterality Date    CORONARY ARTERY BYPASS GRAFT      double     JOINT REPLACEMENT Right     total    JOINT REPLACEMENT Left     patital      Current Outpatient Medications on File Prior to Visit   Medication Sig Dispense Refill    ALPRAZolam (XANAX) 0.5 MG tablet Take 1 tablet (0.5 mg total) by mouth 2 (two) times daily as needed for Anxiety. 60 tablet 2    ascorbic acid, vitamin C, (VITAMIN C) 500 MG tablet Take 500 mg by mouth once daily.      atorvastatin (LIPITOR) 80 MG tablet Take 1 tablet (80 mg total) by mouth once daily. 90 tablet 1    calcium carbonate (OS-RAY) 600 mg calcium (1,500 mg) Tab Take 600 mg by mouth once.      cholecalciferol, vitamin D3, 1,250 mcg (50,000 unit) capsule Take 50,000 Units by mouth once daily.      COLLAGEN MISC Take 5,000 mg by mouth 2 (two) times a day.      diclofenac sodium (VOLTAREN) 1 % Gel Apply 2 g topically once daily.      dicyclomine (BENTYL) 10 MG capsule Take 1 capsule (10 mg total) by mouth 3 (three) times daily as needed (cramping). 30 capsule 3    diltiaZEM (CARDIZEM CD) 120 MG Cp24 Take 1 capsule (120 mg total) by mouth once daily. 90 capsule 3    estradioL (VAGIFEM) 10 mcg Tab Place 1 tablet (10 mcg total) vaginally twice a week. 24 tablet 3    fluticasone furoate-vilanteroL (BREO ELLIPTA) 100-25 mcg/dose diskus inhaler Inhale 1  puff into the lungs once daily. Controller 3 each 3    fluticasone propionate (FLONASE) 50 mcg/actuation nasal spray 1 spray (50 mcg total) by Each Nostril route 2 (two) times a day. 48 g 3    levocetirizine (XYZAL) 5 MG tablet Take 5 mg by mouth every evening.      levothyroxine (SYNTHROID) 150 MCG tablet Take 1 tablet (150 mcg total) by mouth before breakfast. 90 tablet 3    magnesium oxide 500 mg Tab Take 1 tablet by mouth once daily.       metoprolol succinate (TOPROL-XL) 25 MG 24 hr tablet Take 1 tablet (25 mg total) by mouth once daily. 90 tablet 3    montelukast (SINGULAIR) 10 mg tablet Take 1 tablet (10 mg total) by mouth every evening. 90 tablet 3    multivitamin (THERAGRAN) per tablet Take 1 tablet by mouth once daily.      nitroGLYCERIN 0.4 MG/DOSE TL SPRY (NITROLINGUAL) 400 mcg/spray spray Place 2 sprays under the tongue every 5 (five) minutes as needed for Chest pain. 0.004 g 12    raloxifene (EVISTA) 60 mg tablet Take 1 tablet (60 mg total) by mouth once daily. 90 tablet 3    ramipriL (ALTACE) 5 MG capsule Take 1 capsule (5 mg total) by mouth once daily. 90 capsule 3    ranolazine (RANEXA) 500 MG Tb12 Take 2 tablets (1,000 mg total) by mouth 2 (two) times a day. 180 tablet 3     No current facility-administered medications on file prior to visit.     Review of patient's allergies indicates:   Allergen Reactions    Compazine [prochlorperazine edisylate]      Family History   Problem Relation Age of Onset    Atrial fibrillation Mother     Thyroid disease Mother     Osteoporosis Mother     Anxiety disorder Mother     Heart disease Father     Anxiety disorder Father     Mental illness Maternal Uncle         schizophrenia     Anxiety disorder Maternal Grandmother     Ulcers Maternal Grandmother     Cancer Paternal Aunt     Diabetes Paternal Aunt      Social History     Socioeconomic History    Marital status:    Tobacco Use    Smoking status: Never Smoker    Smokeless  tobacco: Never Used   Substance and Sexual Activity    Alcohol use: Not Currently       Review of Systems:  Constitutional:  Denies fever or chills   Eyes:  Denies change in visual acuity   HENT:  Denies nasal congestion or sore throat   Respiratory:  Denies cough or shortness of breath   Cardiovascular:  Denies chest pain or edema   GI:  Denies abdominal pain, nausea, vomiting, bloody stools or diarrhea   :  Denies dysuria   Integument:  Denies rash   Neurologic:  Denies headache, focal weakness or sensory changes   Endocrine:  Denies polyuria or polydipsia   Lymphatic:  Denies swollen glands   Psychiatric:  Denies depression or anxiety     Physical Exam:   Constitutional:  Well developed, well nourished, no acute distress, non-toxic appearance   Integument:  Well hydrated, no rash   Lymphatic:  No lymphadenopathy noted   Neurologic:  Alert & oriented x 3  Psychiatric:  Speech and behavior appropriate     Bilateral Hip Exam    left Hip Exam   left hip exam performed same as contralateral hip and is normal.     right Hip Exam   Tenderness   The patient is experiencing tenderness in the greater trochanter.  Range of Motion   The patient has normal hip ROM.  Muscle Strength   Abduction: 4/5   Other   Erythema: absent  Sensation: normal  Pulse: present    X-rays were personally reviewed by me and findings discussed with the patient.  2 views of the right hip show no fractures or dislocations    Primary osteoarthritis of right knee  -     Ambulatory referral/consult to Physical/Occupational Therapy; Future; Expected date: 03/03/2022    Primary osteoarthritis of left knee  -     Ambulatory referral/consult to Physical/Occupational Therapy; Future; Expected date: 03/03/2022           Using an aseptic technique, I injected 5 cc of lidocaine 1% without and 1 cc of kenalog 40mg into the right Hip. The patient tolerated this well. I will have them return to clinic as needed.

## 2022-02-24 NOTE — PROCEDURES
Large Joint Aspiration/Injection: R greater trochanteric bursa    Date/Time: 2/24/2022 1:30 PM  Performed by: Baljit Cooley MD  Authorized by: Baljit Cooley MD     Consent Done?:  Yes (Verbal)  Indications:  Pain  Timeout: prior to procedure the correct patient, procedure, and site was verified    Prep: patient was prepped and draped in usual sterile fashion      Local anesthesia used?: Yes    Local anesthetic:  Lidocaine 1% without epinephrine  Anesthetic total (ml):  5      Details:  Needle Size:  22 G  Approach:  Lateral  Location:  Hip  Site:  R greater trochanteric bursa  Medications:  40 mg triamcinolone acetonide 40 mg/mL  Patient tolerance:  Patient tolerated the procedure well with no immediate complications

## 2022-03-03 ENCOUNTER — CLINICAL SUPPORT (OUTPATIENT)
Dept: REHABILITATION | Facility: HOSPITAL | Age: 67
End: 2022-03-03
Attending: ORTHOPAEDIC SURGERY
Payer: MEDICARE

## 2022-03-03 DIAGNOSIS — Z74.09 DECREASED STRENGTH, ENDURANCE, AND MOBILITY: ICD-10-CM

## 2022-03-03 DIAGNOSIS — R53.1 DECREASED STRENGTH, ENDURANCE, AND MOBILITY: ICD-10-CM

## 2022-03-03 DIAGNOSIS — M17.11 PRIMARY OSTEOARTHRITIS OF RIGHT KNEE: ICD-10-CM

## 2022-03-03 DIAGNOSIS — M17.12 PRIMARY OSTEOARTHRITIS OF LEFT KNEE: ICD-10-CM

## 2022-03-03 DIAGNOSIS — R68.89 DECREASED STRENGTH, ENDURANCE, AND MOBILITY: ICD-10-CM

## 2022-03-03 PROCEDURE — 97110 THERAPEUTIC EXERCISES: CPT | Mod: PO | Performed by: PHYSICAL THERAPIST

## 2022-03-03 PROCEDURE — 97162 PT EVAL MOD COMPLEX 30 MIN: CPT | Mod: PO | Performed by: PHYSICAL THERAPIST

## 2022-03-03 NOTE — PLAN OF CARE
[facility]  Physical Therapy Initial Evaluation    Name: Melissa Casas  Clinic Number: [id number]    Therapy Diagnosis:   Encounter Diagnoses   Name Primary?    Primary osteoarthritis of right knee     Primary osteoarthritis of left knee     Decreased strength, endurance, and mobility      Physician: Baljit Cooley MD    Physician Orders: PT Eval and Treat   Post Surgical? No Eval and Treat Yes Comment - B KNEE Type of Therapy Outpatient Therapy   Medical Diagnosis from Referral: M17.11 (ICD-10-CM) - Primary osteoarthritis of right knee M17.12 (ICD-10-CM) - Primary osteoarthritis of left knee   Evaluation Date: 3/3/2022  Authorization Period Expiration: 02/24/2023   Plan of Care Expiration: 4/29/2022  Reassessment:  Visit # / Visits authorized: 1/ 1    Time In: 1505  Time Out: 1600  Total Billable Time: 45 minutes    Precautions: Standard, CAD, HTN, OP, OA, Asthma    Subjective   Date of onset: 3-4 mo  History of current condition - Melissa reports: complaining of bilateral knee for 3-4 months and right hip pain for 3 weeks after no known trauma. Pain is progressively worsening. No numbness or tingling. No associated signs or symptoms. She has received injections into hip and knee. L knee is feeling better since receiving injection. R knee is weak. Complains of pain while sleeping on side and when descending stairs.      Medical History:   Past Medical History:   Diagnosis Date    Anxiety     Asthma     CAD (coronary artery disease)     Chronic pain     Coronary artery disease     Hyperlipidemia     Osteopenia     Pneumonia     Primary hypertension     Primary osteoarthritis of left knee 6/24/2021    Thyroid disease        Surgical History:   Melissa Casas  has a past surgical history that includes Joint replacement (Right); Joint replacement (Left); and Coronary artery bypass graft.    Medications:   Melissa has a current medication list which includes the following  prescription(s): alprazolam, ascorbic acid (vitamin c), atorvastatin, calcium carbonate, cholecalciferol (vitamin d3), collagen, diclofenac sodium, dicyclomine, diltiazem, estradiol, breo ellipta, fluticasone propionate, levocetirizine, levothyroxine, magnesium oxide, metoprolol succinate, montelukast, multivitamin, nitroglycerin 0.4 mg/dose tl spry, raloxifene, ramipril, and ranolazine.    Allergies:   Review of patient's allergies indicates:   Allergen Reactions    Compazine [prochlorperazine edisylate]         Imaging,           Contains abnormal data X-Ray Hip 2 or 3 views Right (with Pelvis when performed)  Order: [id number]   Status: Final result     Visible to patient: Yes (seen)     Next appt: 03/08/2022 at 03:00 PM in Outpatient Rehab (Lucero Bar, PT)     Dx: Right hip pain     0 Result Notes    Details    Reading Physician Reading Date Result Priority   Janina Gomez MD  [phone] 1/13/2022 Routine     Narrative & Impression  EXAMINATION:  XR HIP WITH PELVIS WHEN PERFORMED, 2 OR 3  VIEWS RIGHT     CLINICAL HISTORY:  Pain in right hip     TECHNIQUE:  AP view of the pelvis and frog leg lateral view of the right hip were performed.     COMPARISON:  None     FINDINGS:  The bones are osteopenic.  There is moderate bilateral hip joint space narrowing.  No acute fracture appreciated radiographically.  No osteonecrosis of the femoral heads.  There is left gluteus tendon insertion calcific tendinitis.  There is heterotopic ossification and/or enthesophyte formation noted adjacent to the right greater tuberosity.  A chronic/remote greater chronic trochanteric avulsion injury is not excluded.  There is an 18 mm calcific density projected over the inter trochanteric region of the right hip, possibly a bone island or calcific density within a larger ground-glass lesion in the right inter trochanteric region.  Consider additional evaluation with non-contrast CT and/or contrast enhanced MRI of the right hip.   An intraosseous lipoma or LSMFT is possible.  There are vascular calcifications in the medial aspect of both thighs.  There are possible postprocedural changes of bone graft harvest at the left iliac wing.  There is degenerative change of the lower lumbar spine in the form of marginal osteophyte formation, disc space narrowing, and degenerative facet arthropathy.     Impression:     Possible bone lesion in the inter trochanteric region of the proximal right femur..  No prior imaging studies are available for review.  Consider additional evaluation with CT or MRI of the right hip as detailed above.     This report was flagged in Epic as abnormal.        Electronically signed by: Jasper Gomez MD  Date:                                            01/13/2022  Time:                                           13:36             Exam Ended: 01/13/22 12:57 Last Resulted: 01/13/22 13:36           :          X-ray Knee Ortho Left  Order: [id number]   Status: Final result     Visible to patient: Yes (not seen)     Next appt: 03/08/2022 at 03:00 PM in Outpatient Rehab (Lucero Bar, ANGEL LUIS)     Dx: Left knee pain, unspecified chronicity     0 Result Notes    Details    Reading Physician Reading Date Result Priority   Chris Santiago MD  [phone]  [phone] 6/24/2021 Routine     Narrative & Impression  EXAMINATION:  XR KNEE ORTHO LEFT     CLINICAL HISTORY:  Pain in left knee     TECHNIQUE:  AP standing of both knees, Merchant views of both knees as well as a lateral view of the left knee were performed.     COMPARISON:  None     FINDINGS:  Right knee total arthroplasty.  There is subtle cortical offset along the medial tibial plateau seen only on the frontal view.  There is a left medial unicompartmental arthroplasty.  No periprosthetic lucency to suggest loosening.  There is lateral patellar translation on the right and no malalignment on the left.  There is mild degenerative change left lateral and patellofemoral  "compartment.  Trace left knee joint effusion.  Surgical clips medial left calf.     Impression:     Bilateral knee arthroplasty.  Age-indeterminate trauma along the right medial tibial plateau.  Correlate with point tenderness in the region.        Electronically signed by: Chris Santiago  Date:                                            06/24/2021  Time:                                           15:28             Exam Ended: 06/24/21 13:40 Last Resulted: 06/24/21 15:28           Prior Therapy: yes following R TKA and L partial knee.   Social History:  lives with their spouse  Occupation: retired teacher  Prior Level of Function: She has been using cane since last 1.5 years. Last surgery was 2016.   Current Level of Function: Complains of pain while sleeping on side and when descending stairs. Stooping and bending are difficult due to weak knees. Stepping up and over as well as down curbs is difficult.    Pain:  Current 5/10, worst 5/10, best 5/10   Location: bilateral knee   Description: Aching, Dull and Sharp  Aggravating Factors: Bending  Easing Factors: pain medication, rest and voltaren gel    Pts goals: "I want to be able to walk better, do more activities, and make my legs stronger for better endurance."    Objective     Observation: obese, walking with cane    Posture: pes planus (B)    Knee AROM:  R: 0-110 deg  L: 0-110 deg    Lower Extremity Strength  Right LE  Left LE    Knee extension: 3+/5 Knee extension: 3+/5   Knee flexion: 4-/5 Knee flexion: 4-/5   Hip flexion: 4/5 Hip flexion: 3+/5                     Hip abduction: 4/5 Hip abduction: 4/5   Hip adduction: 4-/5 Hip adduction 4-/5   Ankle dorsiflexion: 4-/5 Ankle dorsiflexion: 4-/5   Ankle plantarflexion: 4-/5 Ankle plantarflexion: 4-/5     Palpation: pos L lat knee joint, pos R ant knee at tibial plateau    Sensation: dull at L medial scar, otherwise normal.    Edema: null    CMS Impairment/Limitation/Restriction for FOTO KNEE Survey    Therapist " reviewed FOTO scores for Melissa Casas on 3/3/2022.   FOTO documents entered into [employer] - see Media section.    Limitation Score: 74%  Goal: 53%     TREATMENT   Treatment Time In: 1545  Treatment Time Out: 1553  Total Treatment time separate from Evaluation: 08 minutes    Melissa received therapeutic exercises to develop strength and endurance for 08 minutes including:  QS, SLR, LAQ review. Pt will benefit from additional LE home exercise program next visit.    Home Exercises and Patient Education Provided    Education provided:   - HEP  - Pt/family was provided educational information, including: role of PT, role of pt/caregiver, goals for PT, POC, scheduling, and attendance policy.- pt verbalized understanding.    Written Home Exercises Provided: yes.  Exercises were reviewed and Melissa was able to demonstrate them prior to the end of the session.  Melissa demonstrated good  understanding of the education provided.     See EMR under Patient Instructions for exercises provided 3/3/2022.    Assessment   Melissa is a 66 y.o. female referred to outpatient Physical Therapy with a medical diagnosis of Primary osteoarthritis of right knee, Primary osteoarthritis of left knee. Pt presents with LE deconditioning as she states the pandemic has played a part. She also recognizes her wt contributes to pain, but states that her other health issues are limiting to her being able to move to decrease wt. She demonstrates impaired LE strength impacting her ability to negotiate stairs and gait speed.    Pt prognosis is Good.   Pt will benefit from skilled outpatient Physical Therapy to address the deficits stated above and in the chart below, provide pt/family education, and to maximize pt's level of independence.     Plan of care discussed with patient: Yes  Pt's spiritual, cultural and educational needs considered and patient is agreeable to the plan of care and goals as stated below:     Anticipated Barriers for therapy: comorbidities,  "obese, R TKA, L partial knee    Medical Necessity is demonstrated by the following  History  Co-morbidities and personal factors that may impact the plan of care Co-morbidities:   see below.    Past Medical History:   Diagnosis Date    Anxiety     Asthma     CAD (coronary artery disease)     Chronic pain     Coronary artery disease     Hyperlipidemia     Osteopenia     Pneumonia     Primary hypertension     Primary osteoarthritis of left knee 6/24/2021    Thyroid disease      Personal Factors:   age  lifestyle     high   Examination  Body Structures and Functions, activity limitations and participation restrictions that may impact the plan of care Body Regions:   lower extremities    Body Systems:    strength  gait  motor control  motor learning    Participation Restrictions:   Impaired mobility, gt, QOL    Activity limitations:   Learning and applying knowledge  no deficits    General Tasks and Commands  no deficits    Communication  no deficits    Mobility  lifting and carrying objects  walking    Self care  washing oneself (bathing, drying, washing hands)  dressing    Domestic Life  doing house work (cleaning house, washing dishes, laundry)    Interactions/Relationships  no deficits    Life Areas  no deficits    Community and Social Life  community life         high   Clinical Presentation evolving clinical presentation with changing clinical characteristics moderate   Decision Making/ Complexity Score: moderate     Goals:  Short Term Goals: 4 weeks   -Pt to demo L LE strength grossly 4-/5 or better.  -Pt able to ascend 1-6" step with 3/10 pain or less and enough strength using 1 hand rail.  -FOTO impairment KNEE score to 68% or better for improving QOL.    Long Term Goals: 8 weeks   -Pt to demo B LE strength grossly 4/5 or better.  -Pt able to ascend/descend 6-6" steps with 3/10 pain or less and enough strength using 1-2 hand rail(s).  -FOTO impairment KNEE score to 68% or better for improving " QOL.    Plan   Plan of care Certification: 3/3/2022 to 4/29/2022.    Outpatient Physical Therapy 1-2 times weekly for 8 weeks to include the following interventions: Aquatic Therapy, Electrical Stimulation IFC, Gait Training, Manual Therapy, Moist Heat/ Ice, Neuromuscular Re-ed, Patient Education, Self Care, Therapeutic Activities and Therapeutic Exercise.     Lucero Bar, PT

## 2022-03-08 ENCOUNTER — CLINICAL SUPPORT (OUTPATIENT)
Dept: REHABILITATION | Facility: HOSPITAL | Age: 67
End: 2022-03-08
Attending: ORTHOPAEDIC SURGERY
Payer: MEDICARE

## 2022-03-08 DIAGNOSIS — Z74.09 DECREASED STRENGTH, ENDURANCE, AND MOBILITY: Primary | ICD-10-CM

## 2022-03-08 DIAGNOSIS — R68.89 DECREASED STRENGTH, ENDURANCE, AND MOBILITY: Primary | ICD-10-CM

## 2022-03-08 DIAGNOSIS — R53.1 DECREASED STRENGTH, ENDURANCE, AND MOBILITY: Primary | ICD-10-CM

## 2022-03-08 PROCEDURE — 97110 THERAPEUTIC EXERCISES: CPT | Mod: PO | Performed by: PHYSICAL THERAPIST

## 2022-03-08 NOTE — PROGRESS NOTES
"  Physical Therapy Daily Treatment Note     Name: Melissa Casas  Clinic Number: [id number]    Therapy Diagnosis:   Encounter Diagnosis   Name Primary?    Decreased strength, endurance, and mobility Yes     Physician: Baljit Cooley MD    Visit Date: 3/8/2022  Physician Orders: PT Eval and Treat   Post Surgical? No Eval and Treat Yes Comment - B KNEE Type of Therapy Outpatient Therapy   Medical Diagnosis from Referral: M17.11 (ICD-10-CM) - Primary osteoarthritis of right knee M17.12 (ICD-10-CM) - Primary osteoarthritis of left knee   Evaluation Date: 3/3/2022  Authorization Period Expiration: 02/24/2023, 12/31/2022  Plan of Care Expiration: 4/29/2022  Reassessment:  Visit # / Visits authorized: 1/ 1, 1/20    Time In: 1701  Time Out: 1803  Total Billable Time: 55 minutes    Precautions: Standard, CAD, HTN, OP, OA, Asthma    Subjective     Pt reports: difficulty with SLR and req spouse's assistance, unable to lift Left knee without use of UEs..  She was compliant with home exercise program.  Response to previous treatment: no adverse effect  Functional change: too early    Pain: 0/10  Location: bilateral knee      Objective     Melissa received therapeutic exercises to develop strength, endurance and core stabilization for 62 minutes including:    Recumbent bike 2, 8 mins (subjective taken over 3 mins)  Long arc quad edge of mat 3x10  SLR 4x5 (B)  DOUBLE KNEE TO CHEST on green ball 20x3"  LOWER TRUNK ROTATION on green ball 20x3" each  Adductor ball squeeze 30x3"  Supine TRANSVERSE RECTUS ABDOMINUS in hooklying with bilateral hip Abduction x20, red band  QS on white 1/2 foam 20x3"    Sit to stand 3x5    Home Exercises Provided and Patient Education Provided     Education provided:   - Cont HEP  - post treatment soreness expected    Written Home Exercises Provided: Patient instructed to cont prior HEP.  Exercises were reviewed and Melissa was able to demonstrate them prior to the end of the session.  Melissa demonstrated good " " understanding of the education provided.     See EMR under Patient Instructions for exercises provided 3/3/2022.    Assessment     Melissa tolerated first treatment session well without complaints. She had some trouble with SLR requiring more sets, lower reps. Pt limited by size and wt of legs. She has (bilateral) knee hyperextension, and valgus. Reviewed proper sit to stand and avoiding knee valgus. Pt with significant core weakness noted.  Melissa Is progressing well towards her goals.   Pt prognosis is Good.     Pt will continue to benefit from skilled outpatient physical therapy to address the deficits listed in the problem list box on initial evaluation, provide pt/family education and to maximize pt's level of independence in the home and community environment.     Pt's spiritual, cultural and educational needs considered and pt agreeable to plan of care and goals.    Anticipated barriers to physical therapy: comorbidities, obese, R TKA, L partial knee    Goals: progressing  Short Term Goals: 4 weeks   -Pt to demo L LE strength grossly 4-/5 or better.  -Pt able to ascend 1-6" step with 3/10 pain or less and enough strength using 1 hand rail.  -FOTO impairment KNEE score to 68% or better for improving QOL.     Long Term Goals: 8 weeks   -Pt to demo B LE strength grossly 4/5 or better.  -Pt able to ascend/descend 6-6" steps with 3/10 pain or less and enough strength using 1-2 hand rail(s).  -FOTO impairment KNEE score to 68% or better for improving QOL.    Plan     Plan of care Certification: 3/3/2022 to 4/29/2022.     Outpatient Physical Therapy 1-2 times weekly for 8 weeks to include the following interventions: Aquatic Therapy, Electrical Stimulation IFC, Gait Training, Manual Therapy, Moist Heat/ Ice, Neuromuscular Re-ed, Patient Education, Self Care, Therapeutic Activities and Therapeutic Exercise.    Lucero Bar, PT    "

## 2022-03-09 NOTE — PROGRESS NOTES
"  Physical Therapy Daily Treatment Note     Name: Melissa Casas  Clinic Number: [id number]    Therapy Diagnosis:   Encounter Diagnosis   Name Primary?    Decreased strength, endurance, and mobility Yes     Physician: Baljit Cooley MD    Visit Date: 3/10/2022  Physician Orders: PT Eval and Treat   Post Surgical? No Eval and Treat Yes Comment - B KNEE Type of Therapy Outpatient Therapy   Medical Diagnosis from Referral: M17.11 (ICD-10-CM) - Primary osteoarthritis of right knee M17.12 (ICD-10-CM) - Primary osteoarthritis of left knee   Evaluation Date: 3/3/2022  Authorization Period Expiration: 02/24/2023, 12/31/2022  Plan of Care Expiration: 4/29/2022  Reassessment:  Visit # / Visits authorized: 1/ 1, 2/20    Time In: 1605 (pt is restroom)  Time Out: 1700  Total Billable Time: 45 minutes    Precautions: Standard, CAD, HTN, OP, OA, Asthma    Subjective     Pt reports: leg muscular soreness since last session.  She was compliant with home exercise program.  Response to previous treatment: no adverse effect  Functional change: too early    Pain: 5/10 pre tx and 6/10 post tx  Location: bilateral knee      Objective     Melissa received therapeutic exercises to develop strength, endurance and core stabilization for 55 minutes including:    Recumbent bike 2, 8 mins (subjective taken over 3 mins)  Long arc quad edge of mat 3x10  SLR 4x5 (B)  DOUBLE KNEE TO CHEST on green ball 20x3"  LOWER TRUNK ROTATION on green ball 20x3" each  Adductor ball squeeze 30x3"  Supine TRANSVERSE RECTUS ABDOMINUS in hooklying with bilateral hip Abduction x20, red band  QS on white 1/2 foam 20x3"    Sit to stand 3x5    Home Exercises Provided and Patient Education Provided     Education provided:   - Cont HEP  - post treatment soreness expected    Written Home Exercises Provided: Patient instructed to cont prior HEP.  Exercises were reviewed and Melissa was able to demonstrate them prior to the end of the session.  Melissa demonstrated good  " "understanding of the education provided.     See EMR under Patient Instructions for exercises provided 3/3/2022.    Assessment     Melissa is reporting expected post treatment soreness. Pt with significant core and hip weakness noted.  Melissa Is progressing well towards her goals.   Pt prognosis is Good.     Pt will continue to benefit from skilled outpatient physical therapy to address the deficits listed in the problem list box on initial evaluation, provide pt/family education and to maximize pt's level of independence in the home and community environment.     Pt's spiritual, cultural and educational needs considered and pt agreeable to plan of care and goals.    Anticipated barriers to physical therapy: comorbidities, obese, R TKA, L partial knee    Goals: progressing  Short Term Goals: 4 weeks   -Pt to demo L LE strength grossly 4-/5 or better.  -Pt able to ascend 1-6" step with 3/10 pain or less and enough strength using 1 hand rail.  -FOTO impairment KNEE score to 68% or better for improving QOL.     Long Term Goals: 8 weeks   -Pt to demo B LE strength grossly 4/5 or better.  -Pt able to ascend/descend 6-6" steps with 3/10 pain or less and enough strength using 1-2 hand rail(s).  -FOTO impairment KNEE score to 68% or better for improving QOL.    Plan     Plan of care Certification: 3/3/2022 to 4/29/2022.     Outpatient Physical Therapy 1-2 times weekly for 8 weeks to include the following interventions: Aquatic Therapy, Electrical Stimulation IFC, Gait Training, Manual Therapy, Moist Heat/ Ice, Neuromuscular Re-ed, Patient Education, Self Care, Therapeutic Activities and Therapeutic Exercise.    Lucero Bar, PT    "

## 2022-03-10 ENCOUNTER — CLINICAL SUPPORT (OUTPATIENT)
Dept: REHABILITATION | Facility: HOSPITAL | Age: 67
End: 2022-03-10
Attending: ORTHOPAEDIC SURGERY
Payer: MEDICARE

## 2022-03-10 DIAGNOSIS — R53.1 DECREASED STRENGTH, ENDURANCE, AND MOBILITY: Primary | ICD-10-CM

## 2022-03-10 DIAGNOSIS — R68.89 DECREASED STRENGTH, ENDURANCE, AND MOBILITY: Primary | ICD-10-CM

## 2022-03-10 DIAGNOSIS — Z74.09 DECREASED STRENGTH, ENDURANCE, AND MOBILITY: Primary | ICD-10-CM

## 2022-03-10 PROCEDURE — 97110 THERAPEUTIC EXERCISES: CPT | Mod: PO | Performed by: PHYSICAL THERAPIST

## 2022-03-15 ENCOUNTER — CLINICAL SUPPORT (OUTPATIENT)
Dept: REHABILITATION | Facility: HOSPITAL | Age: 67
End: 2022-03-15
Attending: ORTHOPAEDIC SURGERY
Payer: MEDICARE

## 2022-03-15 DIAGNOSIS — R68.89 DECREASED STRENGTH, ENDURANCE, AND MOBILITY: Primary | ICD-10-CM

## 2022-03-15 DIAGNOSIS — R53.1 DECREASED STRENGTH, ENDURANCE, AND MOBILITY: Primary | ICD-10-CM

## 2022-03-15 DIAGNOSIS — Z74.09 DECREASED STRENGTH, ENDURANCE, AND MOBILITY: Primary | ICD-10-CM

## 2022-03-15 PROCEDURE — 97110 THERAPEUTIC EXERCISES: CPT | Mod: PO | Performed by: PHYSICAL THERAPIST

## 2022-03-15 NOTE — PROGRESS NOTES
"  Physical Therapy Daily Treatment Note     Name: Melissa Casas  Clinic Number: [id number]    Therapy Diagnosis:   Encounter Diagnosis   Name Primary?    Decreased strength, endurance, and mobility Yes     Physician: Baljit Cooley MD    Visit Date: 3/15/2022  Physician Orders: PT Eval and Treat   Post Surgical? No Eval and Treat Yes Comment - B KNEE Type of Therapy Outpatient Therapy   Medical Diagnosis from Referral: M17.11 (ICD-10-CM) - Primary osteoarthritis of right knee M17.12 (ICD-10-CM) - Primary osteoarthritis of left knee   Evaluation Date: 3/3/2022  Authorization Period Expiration: 02/24/2023, 12/31/2022  Plan of Care Expiration: 4/29/2022  Reassessment:  Visit # / Visits authorized: 1/ 1, 3/20    Time In: 1445  Time Out: 1550  Total Billable Time: 54 minutes    Precautions: Standard, CAD, HTN, OP, OA, Asthma    Subjective     Pt reports: leg muscular soreness since last session.  She was compliant with home exercise program.  Response to previous treatment: no adverse effect  Functional change: too early    Pain: 0/10 pre tx and 6/10 post tx  Location: bilateral knee      Objective     Melissa received therapeutic exercises to develop strength, endurance and core stabilization for 60 minutes including:    Recumbent bike 2, 12.5 mins (subjective taken over 3 mins)  Mini squats at head of table 2x10  Long arc quad edge of mat 3x10  SLR 4x5 (B)  DOUBLE KNEE TO CHEST on green ball 20x3"  LOWER TRUNK ROTATION on green ball 20x3" each  Adductor ball squeeze 30x3"  Supine TRANSVERSE RECTUS ABDOMINUS in hooklying with bilateral hip Abduction x20, green band, single hip Abduction x10 each green band  Side lying clams green band 2x10 each  QS on white 1/2 foam 20x3"    Sit to stand 3x5    Home Exercises Provided and Patient Education Provided     Education provided:   - Cont HEP  - post treatment soreness expected    Written Home Exercises Provided: Patient instructed to cont prior HEP.  Exercises were reviewed and " "Melissa was able to demonstrate them prior to the end of the session.  Melissa demonstrated good  understanding of the education provided.     See EMR under Patient Instructions for exercises provided 3/3/2022.    Assessment     Melissa is reporting expected post treatment soreness. Pt with significant core and hip weakness noted. She is gradually tolerated more reps without increasing total pain. Increased ex and reps.  Melissa Is progressing well towards her goals.   Pt prognosis is Good.     Pt will continue to benefit from skilled outpatient physical therapy to address the deficits listed in the problem list box on initial evaluation, provide pt/family education and to maximize pt's level of independence in the home and community environment.     Pt's spiritual, cultural and educational needs considered and pt agreeable to plan of care and goals.    Anticipated barriers to physical therapy: comorbidities, obese, R TKA, L partial knee    Goals: progressing  Short Term Goals: 4 weeks   -Pt to demo L LE strength grossly 4-/5 or better.  -Pt able to ascend 1-6" step with 3/10 pain or less and enough strength using 1 hand rail.  -FOTO impairment KNEE score to 68% or better for improving QOL.     Long Term Goals: 8 weeks   -Pt to demo B LE strength grossly 4/5 or better.  -Pt able to ascend/descend 6-6" steps with 3/10 pain or less and enough strength using 1-2 hand rail(s).  -FOTO impairment KNEE score to 68% or better for improving QOL.    Plan     Plan of care Certification: 3/3/2022 to 4/29/2022.     Outpatient Physical Therapy 1-2 times weekly for 8 weeks to include the following interventions: Aquatic Therapy, Electrical Stimulation IFC, Gait Training, Manual Therapy, Moist Heat/ Ice, Neuromuscular Re-ed, Patient Education, Self Care, Therapeutic Activities and Therapeutic Exercise.    Lucero Bar, PT    "

## 2022-03-16 NOTE — PROGRESS NOTES
"  Physical Therapy Daily Treatment Note     Name: Melissa Casas  Clinic Number: [id number]    Therapy Diagnosis:   Encounter Diagnosis   Name Primary?    Decreased strength, endurance, and mobility Yes     Physician: Baljit Cooley MD    Visit Date: 3/17/2022  Physician Orders: PT Eval and Treat   Post Surgical? No Eval and Treat Yes Comment - B KNEE Type of Therapy Outpatient Therapy   Medical Diagnosis from Referral: M17.11 (ICD-10-CM) - Primary osteoarthritis of right knee M17.12 (ICD-10-CM) - Primary osteoarthritis of left knee   Evaluation Date: 3/3/2022  Authorization Period Expiration: 02/24/2023, 12/31/2022  Plan of Care Expiration: 4/29/2022  Reassessment:  Visit # / Visits authorized: 1/ 1, 3/20    Time In: 1445  Time Out: 1550  Total Billable Time: 54 minutes    Precautions: Standard, CAD, HTN, OP, OA, Asthma    Subjective     Pt reports: leg muscular soreness since last session.  She was compliant with home exercise program.  Response to previous treatment: no adverse effect  Functional change: too early    Pain: 0/10 pre tx and 6/10 post tx  Location: bilateral knee      Objective     Melissa received therapeutic exercises to develop strength, endurance and core stabilization for 60 minutes including:    Recumbent bike 2, 12.5 mins (subjective taken over 3 mins)  Mini squats at head of table 2x10  Long arc quad edge of mat 3x10  SLR 4x5 (B)  DOUBLE KNEE TO CHEST on green ball 20x3"  LOWER TRUNK ROTATION on green ball 20x3" each  Adductor ball squeeze 30x3"  Supine TRANSVERSE RECTUS ABDOMINUS in hooklying with bilateral hip Abduction x20, green band, single hip Abduction x10 each green band  Side lying clams green band 2x10 each  QS on white 1/2 foam 20x3"    Sit to stand 3x5    Home Exercises Provided and Patient Education Provided     Education provided:   - Cont HEP  - post treatment soreness expected    Written Home Exercises Provided: Patient instructed to cont prior HEP.  Exercises were reviewed and " "Melissa was able to demonstrate them prior to the end of the session.  Melissa demonstrated good  understanding of the education provided.     See EMR under Patient Instructions for exercises provided 3/3/2022.    Assessment     Melissa is reporting expected post treatment soreness. Pt with significant core and hip weakness noted. She is gradually tolerated more reps without increasing total pain. Increased ex and reps.  Melissa Is progressing well towards her goals.   Pt prognosis is Good.     Pt will continue to benefit from skilled outpatient physical therapy to address the deficits listed in the problem list box on initial evaluation, provide pt/family education and to maximize pt's level of independence in the home and community environment.     Pt's spiritual, cultural and educational needs considered and pt agreeable to plan of care and goals.    Anticipated barriers to physical therapy: comorbidities, obese, R TKA, L partial knee    Goals: progressing  Short Term Goals: 4 weeks   -Pt to demo L LE strength grossly 4-/5 or better.  -Pt able to ascend 1-6" step with 3/10 pain or less and enough strength using 1 hand rail.  -FOTO impairment KNEE score to 68% or better for improving QOL.     Long Term Goals: 8 weeks   -Pt to demo B LE strength grossly 4/5 or better.  -Pt able to ascend/descend 6-6" steps with 3/10 pain or less and enough strength using 1-2 hand rail(s).  -FOTO impairment KNEE score to 68% or better for improving QOL.    Plan     Plan of care Certification: 3/3/2022 to 4/29/2022.     Outpatient Physical Therapy 1-2 times weekly for 8 weeks to include the following interventions: Aquatic Therapy, Electrical Stimulation IFC, Gait Training, Manual Therapy, Moist Heat/ Ice, Neuromuscular Re-ed, Patient Education, Self Care, Therapeutic Activities and Therapeutic Exercise.    Lucero Bar, PT    "

## 2022-03-17 ENCOUNTER — CLINICAL SUPPORT (OUTPATIENT)
Dept: REHABILITATION | Facility: HOSPITAL | Age: 67
End: 2022-03-17
Attending: ORTHOPAEDIC SURGERY
Payer: MEDICARE

## 2022-03-17 DIAGNOSIS — Z74.09 DECREASED STRENGTH, ENDURANCE, AND MOBILITY: Primary | ICD-10-CM

## 2022-03-17 DIAGNOSIS — R68.89 DECREASED STRENGTH, ENDURANCE, AND MOBILITY: Primary | ICD-10-CM

## 2022-03-17 DIAGNOSIS — R53.1 DECREASED STRENGTH, ENDURANCE, AND MOBILITY: Primary | ICD-10-CM

## 2022-03-22 NOTE — PROGRESS NOTES
"  Physical Therapy Daily Treatment Note     Name: Melissa Casas  Clinic Number: [id number]    Therapy Diagnosis:   Encounter Diagnosis   Name Primary?    Decreased strength, endurance, and mobility Yes     Physician: Baljit Cooley MD    Visit Date: 3/17/2022  Physician Orders: PT Eval and Treat   Post Surgical? No Eval and Treat Yes Comment - B KNEE Type of Therapy Outpatient Therapy   Medical Diagnosis from Referral: M17.11 (ICD-10-CM) - Primary osteoarthritis of right knee M17.12 (ICD-10-CM) - Primary osteoarthritis of left knee   Evaluation Date: 3/3/2022  Authorization Period Expiration: 02/24/2023, 12/31/2022  Plan of Care Expiration: 4/29/2022  Reassessment:  Visit # / Visits authorized: 1/ 1, 4/20    Time In: 3:11  Time Out: 3:45  Total Billable Time:45 minutes    Precautions: Standard, CAD, HTN, OP, OA, Asthma    Subjective     Pt reports: leg muscular soreness since last session.  She was compliant with home exercise program.  Response to previous treatment: no adverse effect  Functional change: too early    Pain: 0/10 pre tx  Location: bilateral knee      Objective     Melissa received therapeutic exercises to develop strength, endurance and core stabilization for 60 minutes including:    Recumbent bike 2, 12.5 mins (subjective taken over 3 mins)  Mini squats at head of table 2x10  Long arc quad edge of mat 3x10  SLR 4x5 (B)  DOUBLE KNEE TO CHEST on green ball 20x3"  LOWER TRUNK ROTATION on green ball 20x3" each  Adductor ball squeeze 30x3"  Supine TRANSVERSE RECTUS ABDOMINUS in hooklying with bilateral hip Abduction x20, green band, single hip Abduction x10 each green band  Side lying clams green band 2x10 each  QS on white 1/2 foam 20x3"    Sit to stand 3x5    Home Exercises Provided and Patient Education Provided     Education provided:   - Cont HEP  - post treatment soreness expected    Written Home Exercises Provided: Patient instructed to cont prior HEP.  Exercises were reviewed and Melissa was able to " "demonstrate them prior to the end of the session.  Melissa demonstrated good  understanding of the education provided.     See EMR under Patient Instructions for exercises provided 3/3/2022.    Assessment     Melissa donny today's tx with ther ex well. She was w/o complaint with exs. She demonstrates good effort with all activiites.  Pt with significant core and hip weakness noted. She was guided through her ex program by PT rehab tech today with no issues reported. No charges posted to account today.   Melissa Is progressing well towards her goals.   Pt prognosis is Good.     Pt will continue to benefit from skilled outpatient physical therapy to address the deficits listed in the problem list box on initial evaluation, provide pt/family education and to maximize pt's level of independence in the home and community environment.     Pt's spiritual, cultural and educational needs considered and pt agreeable to plan of care and goals.    Anticipated barriers to physical therapy: comorbidities, obese, R TKA, L partial knee    Goals: progressing  Short Term Goals: 4 weeks   -Pt to demo L LE strength grossly 4-/5 or better.  -Pt able to ascend 1-6" step with 3/10 pain or less and enough strength using 1 hand rail.  -FOTO impairment KNEE score to 68% or better for improving QOL.     Long Term Goals: 8 weeks   -Pt to demo B LE strength grossly 4/5 or better.  -Pt able to ascend/descend 6-6" steps with 3/10 pain or less and enough strength using 1-2 hand rail(s).  -FOTO impairment KNEE score to 68% or better for improving QOL.    Plan     Plan of care Certification: 3/3/2022 to 4/29/2022.     Outpatient Physical Therapy 1-2 times weekly for 8 weeks to include the following interventions: Aquatic Therapy, Electrical Stimulation IFC, Gait Training, Manual Therapy, Moist Heat/ Ice, Neuromuscular Re-ed, Patient Education, Self Care, Therapeutic Activities and Therapeutic Exercise.    Wild [place], PTA    "

## 2022-03-24 ENCOUNTER — CLINICAL SUPPORT (OUTPATIENT)
Dept: REHABILITATION | Facility: HOSPITAL | Age: 67
End: 2022-03-24
Attending: ORTHOPAEDIC SURGERY
Payer: MEDICARE

## 2022-03-24 DIAGNOSIS — R68.89 DECREASED STRENGTH, ENDURANCE, AND MOBILITY: Primary | ICD-10-CM

## 2022-03-24 DIAGNOSIS — R53.1 DECREASED STRENGTH, ENDURANCE, AND MOBILITY: Primary | ICD-10-CM

## 2022-03-24 DIAGNOSIS — Z74.09 DECREASED STRENGTH, ENDURANCE, AND MOBILITY: Primary | ICD-10-CM

## 2022-03-24 PROCEDURE — 97110 THERAPEUTIC EXERCISES: CPT | Mod: PO | Performed by: PHYSICAL THERAPIST

## 2022-03-24 NOTE — PROGRESS NOTES
"  Physical Therapy Daily Treatment Note     Name: Melissa Casas  Clinic Number: [id number]    Therapy Diagnosis:   Encounter Diagnosis   Name Primary?    Decreased strength, endurance, and mobility Yes     Physician: Baljit Cooley MD    Visit Date: 3/24/2022  Physician Orders: PT Eval and Treat   Post Surgical? No Eval and Treat Yes Comment - B KNEE Type of Therapy Outpatient Therapy   Medical Diagnosis from Referral: M17.11 (ICD-10-CM) - Primary osteoarthritis of right knee M17.12 (ICD-10-CM) - Primary osteoarthritis of left knee   Evaluation Date: 3/3/2022  Authorization Period Expiration: 02/24/2023, 12/31/2022  Plan of Care Expiration: 4/29/2022  Reassessment:  Visit # / Visits authorized: 1/ 1, 5/20    Time In: 3:00  Time Out: 3:55  Total Billable Time: 30 minutes    Precautions: Standard, CAD, HTN, OP, OA, Asthma    Subjective     Pt reports: leg muscular soreness since last session. Possibly from the change in weather. Her program continues challenging.  She was compliant with home exercise program.  Response to previous treatment: no adverse effect  Functional change: too early    Pain: 6/10 pre tx  Location: bilateral knee      Objective     Melissa received therapeutic exercises to develop strength, endurance and core stabilization for 45 minutes including:    Recumbent bike 2, 15 mins (subjective taken over 3 mins)  Mini squats at head of table 2x10  Long arc quad edge of mat 3x10  SLR 4x5 (B), 1# Left   Bridge 2x10  DOUBLE KNEE TO CHEST on green ball 20x3"  LOWER TRUNK ROTATION on green ball 20x3" each  Adductor ball squeeze 30x3"  Supine TRANSVERSE RECTUS ABDOMINUS in hooklying with bilateral hip Abduction x20, green band, single hip Abduction x10 each green band  Side lying clams green band 2x10 each  QS on white 1/2 foam 20x3"    Sit to stand 3x5    Home Exercises Provided and Patient Education Provided     Education provided:   - Cont HEP  - post treatment soreness expected    Written Home Exercises " "Provided: Patient instructed to cont prior HEP.  Exercises were reviewed and Melissa was able to demonstrate them prior to the end of the session.  Melissa demonstrated good  understanding of the education provided.     See EMR under Patient Instructions for exercises provided 3/3/2022.    Assessment     Melissa donny today's tx with ther ex well. She was w/o complaint with exs. She demonstrates fatigue with repetitious ex. Melissa continues with significant LE weakness and overall deconditioning limiting her progress in difficulty of ex. Standing squats were difficult today. Will progress pt when appropriate.  Melissa Is progressing well towards her goals.   Pt prognosis is Good.     Pt will continue to benefit from skilled outpatient physical therapy to address the deficits listed in the problem list box on initial evaluation, provide pt/family education and to maximize pt's level of independence in the home and community environment.     Pt's spiritual, cultural and educational needs considered and pt agreeable to plan of care and goals.    Anticipated barriers to physical therapy: comorbidities, obese, R TKA, L partial knee    Goals: progressing  Short Term Goals: 4 weeks   -Pt to demo L LE strength grossly 4-/5 or better.  -Pt able to ascend 1-6" step with 3/10 pain or less and enough strength using 1 hand rail.  -FOTO impairment KNEE score to 68% or better for improving QOL.     Long Term Goals: 8 weeks   -Pt to demo B LE strength grossly 4/5 or better.  -Pt able to ascend/descend 6-6" steps with 3/10 pain or less and enough strength using 1-2 hand rail(s).  -FOTO impairment KNEE score to 68% or better for improving QOL.    Plan     Plan of care Certification: 3/3/2022 to 4/29/2022.     Outpatient Physical Therapy 1-2 times weekly for 8 weeks to include the following interventions: Aquatic Therapy, Electrical Stimulation IFC, Gait Training, Manual Therapy, Moist Heat/ Ice, Neuromuscular Re-ed, Patient Education, Self " Care, Therapeutic Activities and Therapeutic Exercise.    Lucero Bar, PT

## 2022-03-29 ENCOUNTER — CLINICAL SUPPORT (OUTPATIENT)
Dept: REHABILITATION | Facility: HOSPITAL | Age: 67
End: 2022-03-29
Attending: ORTHOPAEDIC SURGERY
Payer: MEDICARE

## 2022-03-29 DIAGNOSIS — R53.1 DECREASED STRENGTH, ENDURANCE, AND MOBILITY: Primary | ICD-10-CM

## 2022-03-29 DIAGNOSIS — Z74.09 DECREASED STRENGTH, ENDURANCE, AND MOBILITY: Primary | ICD-10-CM

## 2022-03-29 DIAGNOSIS — R68.89 DECREASED STRENGTH, ENDURANCE, AND MOBILITY: Primary | ICD-10-CM

## 2022-03-29 PROCEDURE — 97110 THERAPEUTIC EXERCISES: CPT | Mod: PO | Performed by: PHYSICAL THERAPIST

## 2022-03-29 NOTE — PROGRESS NOTES
"  Reassessment/Physical Therapy Daily Treatment Note     Name: Melissa Casas  Clinic Number: [id number]    Therapy Diagnosis:   Encounter Diagnosis   Name Primary?    Decreased strength, endurance, and mobility Yes     Physician: Baljit Cooley MD    Visit Date: 3/29/2022  Physician Orders: PT Eval and Treat   Post Surgical? No Eval and Treat Yes Comment - B KNEE Type of Therapy Outpatient Therapy   Medical Diagnosis from Referral: M17.11 (ICD-10-CM) - Primary osteoarthritis of right knee M17.12 (ICD-10-CM) - Primary osteoarthritis of left knee   Evaluation Date: 3/3/2022  Authorization Period Expiration: 02/24/2023, 12/31/2022  Plan of Care Expiration: 4/29/2022  Reassessment: 3/29/2022  Visit # / Visits authorized: 1/ 1, 6/20    Time In: 3:00  Time Out: 4:00  Total Billable Time: 40 minutes    Precautions: Standard, CAD, HTN, OP, OA, Asthma    Subjective     Pt reports: both hips hurt in walking or standing, better in laying.  She was compliant with home exercise program.  Response to previous treatment: no adverse effect  Functional change: too early    Pain: 7/10 pre tx  Location: bilateral knee      Objective     Melissa received therapeutic exercises to develop strength, endurance and core stabilization for 45 minutes including:    Reassessment.  Recumbent bike 2, 12 mins (subjective taken)  Mini squats at head of table 2x10  Standing heel raises x20  Long arc quad edge of mat 3x10  SLR 4x5 (B), 1# Left   Bridge 2x10  DOUBLE KNEE TO CHEST on green ball 20x3"  LOWER TRUNK ROTATION on green ball 20x3" each    Not performed due to time:  Adductor ball squeeze 30x3"  Supine TRANSVERSE RECTUS ABDOMINUS in hooklying with bilateral hip Abduction x20, green band, single hip Abduction x10 each green band  Side lying clams green band 2x10 each  QS on white 1/2 foam 20x3"  Sit to stand 3x5    Home Exercises Provided and Patient Education Provided     Education provided:   - Cont HEP  - post treatment soreness " "expected    Written Home Exercises Provided: Patient instructed to cont prior HEP. Green and blue band given.  Exercises were reviewed and Melissa was able to demonstrate them prior to the end of the session.  Melissa demonstrated good  understanding of the education provided.     See EMR under Patient Instructions for exercises provided 3/3/2022.    Assessment     Melissa donny today's ok. Pain report varies, but is not changed. She is overall deconditioned with respiratory problem, hip, and knee pain. She has significant difficulty getting on recumbent bike and requires use of her own UEs for assistance and unable to perform hip flexion to get legs in stirrups of bike. Strength and endurance is slowly improving as pt tolerates bike sometimes longer. Will progress pt when appropriate. 1/3 STGs achieved.  Melissa Is progressing well towards her goals.   Pt prognosis is Good.     Pt will continue to benefit from skilled outpatient physical therapy to address the deficits listed in the problem list box on initial evaluation, provide pt/family education and to maximize pt's level of independence in the home and community environment.     Pt's spiritual, cultural and educational needs considered and pt agreeable to plan of care and goals.    Anticipated barriers to physical therapy: comorbidities, obese, R TKA, L partial knee    Goals: progressing  Short Term Goals: 4 weeks   -Pt to demo L LE strength grossly 4-/5 or better.-MET  -Pt able to ascend 1-6" step with 3/10 pain or less and enough strength using 1 hand rail.-progressing, not met, 3 STEPS (bilateral) HR  -FOTO impairment KNEE score to 68% or better for improving QOL.-TBA     Long Term Goals: 8 weeks   -Pt to demo B LE strength grossly 4/5 or better.  -Pt able to ascend/descend 6-6" steps with 3/10 pain or less and enough strength using 1-2 hand rail(s).  -FOTO impairment KNEE score to 68% or better for improving QOL.    Plan     Plan of care Certification: 3/3/2022 to " 4/29/2022.     Outpatient Physical Therapy 1-2 times weekly for 8 weeks to include the following interventions: Aquatic Therapy, Electrical Stimulation IFC, Gait Training, Manual Therapy, Moist Heat/ Ice, Neuromuscular Re-ed, Patient Education, Self Care, Therapeutic Activities and Therapeutic Exercise.    Lucero Bar, PT

## 2022-04-12 ENCOUNTER — CLINICAL SUPPORT (OUTPATIENT)
Dept: REHABILITATION | Facility: HOSPITAL | Age: 67
End: 2022-04-12
Attending: ORTHOPAEDIC SURGERY
Payer: MEDICARE

## 2022-04-12 DIAGNOSIS — R53.1 DECREASED STRENGTH, ENDURANCE, AND MOBILITY: Primary | ICD-10-CM

## 2022-04-12 DIAGNOSIS — R68.89 DECREASED STRENGTH, ENDURANCE, AND MOBILITY: Primary | ICD-10-CM

## 2022-04-12 DIAGNOSIS — Z74.09 DECREASED STRENGTH, ENDURANCE, AND MOBILITY: Primary | ICD-10-CM

## 2022-04-12 PROCEDURE — 97110 THERAPEUTIC EXERCISES: CPT | Mod: PO | Performed by: PHYSICAL THERAPIST

## 2022-04-12 NOTE — PROGRESS NOTES
"  Reassessment/Physical Therapy Daily Treatment Note     Name: Melissa Casas  Clinic Number: [id number]    Therapy Diagnosis:   Encounter Diagnosis   Name Primary?    Decreased strength, endurance, and mobility Yes     Physician: Baljit Cooley MD    Visit Date: 4/12/2022  Physician Orders: PT Eval and Treat   Post Surgical? No Eval and Treat Yes Comment - B KNEE Type of Therapy Outpatient Therapy   Medical Diagnosis from Referral: M17.11 (ICD-10-CM) - Primary osteoarthritis of right knee M17.12 (ICD-10-CM) - Primary osteoarthritis of left knee   Evaluation Date: 3/3/2022  Authorization Period Expiration: 02/24/2023, 12/31/2022  Plan of Care Expiration: 4/29/2022  Reassessment: 3/29/2022  Visit # / Visits authorized: 1/ 1, 8/20    Time In: 3:00  Time Out: 4:00  Total Billable Time: 40 minutes    Precautions: Standard, CAD, HTN, OP, OA, Asthma    Subjective     Pt reports: Left>R hip pain. Minimal progress noted. PT Appts not open next week. Able to see NP in a few weeks.  She was compliant with home exercise program.  Response to previous treatment: no adverse effect  Functional change: too early    Pain: 3/10 pre tx  Location: bilateral knee      Objective     Melissa received therapeutic exercises to develop strength, endurance and core stabilization for 60 minutes including:    Recumbent bike 2, 15 mins (subjective taken)  Mini squats at head of table x20  TKE x20 into ball on wall  Standing heel raises x20  Long arc quad edge of mat 3x10, 1#  SLR 2x10 (B), 1# Left   Bridge 3x10  DOUBLE KNEE TO CHEST on green ball 20x3"  LOWER TRUNK ROTATION on green ball 20x3" each    Leg press 2x10 20#    Not performed due to time:  Adductor ball squeeze 30x3"  Supine TRANSVERSE RECTUS ABDOMINUS in hooklying with bilateral hip Abduction x20, green band, single hip Abduction x10 each green band  Side lying clams green band 2x10 each  QS on white 1/2 foam 20x3"  Sit to stand 3x5    Home Exercises Provided and Patient Education " "Provided     Education provided:   - Cont HEP  - post treatment soreness expected    Written Home Exercises Provided: Patient instructed to cont prior HEP. Green and blue band given.  Exercises were reviewed and Melissa was able to demonstrate them prior to the end of the session.  Melissa demonstrated good  understanding of the education provided.     See EMR under Patient Instructions for exercises provided 3/3/2022.    Assessment     Melissa donny today with improved tolerance to ex. She is overall deconditioned with respiratory problems, hip, and knee pain. She has significant difficulty getting on recumbent bike and requires use of her own UEs for assistance and unable to perform hip flexion to get legs in stirrups of bike. Strength and endurance is slowly improving as pt tolerates bike sometimes longer. Increased sets/reps/resistance today. Melissa has improved overall function as seen on the FOTO impairment questionnaire. Pain and inabilities are not acceptable for her. Continues on SC. She is set to see [facility] with Dr. Cooley soon for reassessment. Will progress pt when appropriate. 2/3 STGs achieved.  Melissa Is progressing well towards her goals.   Pt prognosis is Good.     Pt will continue to benefit from skilled outpatient physical therapy to address the deficits listed in the problem list box on initial evaluation, provide pt/family education and to maximize pt's level of independence in the home and community environment.     Pt's spiritual, cultural and educational needs considered and pt agreeable to plan of care and goals.    Anticipated barriers to physical therapy: comorbidities, obese, R TKA, L partial knee    Goals: progressing  Short Term Goals: 4 weeks   -Pt to demo L LE strength grossly 4-/5 or better.-MET  -Pt able to ascend 1-6" step with 3/10 pain or less and enough strength using 1 hand rail.-progressing, not met, 3 STEPS (bilateral) HR  -FOTO impairment KNEE score to 68% or better for improving " "QOL.-MET, 55%     Long Term Goals: 8 weeks   -Pt to demo B LE strength grossly 4/5 or better.  -Pt able to ascend/descend 6-6" steps with 3/10 pain or less and enough strength using 1-2 hand rail(s).  -FOTO impairment KNEE score to 53% or better for improving QOL. Progressing, not met. Modified from james due to error.    Plan     Plan of care Certification: 3/3/2022 to 4/29/2022.     Outpatient Physical Therapy 1-2 times weekly for 8 weeks to include the following interventions: Aquatic Therapy, Electrical Stimulation IFC, Gait Training, Manual Therapy, Moist Heat/ Ice, Neuromuscular Re-ed, Patient Education, Self Care, Therapeutic Activities and Therapeutic Exercise.    Lucero Bar, PT    "

## 2022-04-26 ENCOUNTER — DOCUMENTATION ONLY (OUTPATIENT)
Dept: REHABILITATION | Facility: HOSPITAL | Age: 67
End: 2022-04-26
Payer: MEDICARE

## 2022-04-28 ENCOUNTER — CLINICAL SUPPORT (OUTPATIENT)
Dept: REHABILITATION | Facility: HOSPITAL | Age: 67
End: 2022-04-28
Attending: ORTHOPAEDIC SURGERY
Payer: MEDICARE

## 2022-04-28 DIAGNOSIS — Z74.09 DECREASED STRENGTH, ENDURANCE, AND MOBILITY: Primary | ICD-10-CM

## 2022-04-28 DIAGNOSIS — R68.89 DECREASED STRENGTH, ENDURANCE, AND MOBILITY: Primary | ICD-10-CM

## 2022-04-28 DIAGNOSIS — R53.1 DECREASED STRENGTH, ENDURANCE, AND MOBILITY: Primary | ICD-10-CM

## 2022-04-28 PROCEDURE — 97110 THERAPEUTIC EXERCISES: CPT | Mod: PO | Performed by: PHYSICAL THERAPIST

## 2022-04-28 NOTE — PROGRESS NOTES
"  Reassessment/Physical Therapy Daily Treatment Note     Name: Melissa Casas  Clinic Number: [id number]    Therapy Diagnosis:   Encounter Diagnosis   Name Primary?    Decreased strength, endurance, and mobility Yes     Physician: Baljit Cooley MD    Visit Date: 4/28/2022  Physician Orders: PT Eval and Treat   Post Surgical? No Eval and Treat Yes Comment - B KNEE Type of Therapy Outpatient Therapy   Medical Diagnosis from Referral: M17.11 (ICD-10-CM) - Primary osteoarthritis of right knee M17.12 (ICD-10-CM) - Primary osteoarthritis of left knee   Evaluation Date: 3/3/2022  Authorization Period Expiration: 02/24/2023, 12/31/2022  Plan of Care Expiration: 4/29/2022, 5/27/2022  Reassessment: 3/29/2022, 4/28/2022  Visit # / Visits authorized: 1/ 1, 9/20    Time In: 3:00  Time Out: 4:00  Total Billable Time: 40 minutes    Precautions: Standard, CAD, HTN, OP, OA, Asthma    Subjective     Pt reports: "I am having hip and knee problems. I could hardly walk before taking this." She has taken pain medication. She feels improvement with getting on a shuttle bus which is about 8-12" steps. She can climb stairs with 1 hand rail and her Hurrycane. Getting into her shower is a bit of a lip, but she gets in without trouble. The tub shower at the hotel recently was very challenging. She has significant trouble getting onto the recumbent bike. She feels short on air at times from her breathing trouble.  She was compliant with home exercise program.  Response to previous treatment: no adverse effect  Functional change: too early    Pain: 7/10 pre tx  Location: bilateral knee      Objective       Chair rise test: 17.5" without UE use    5 Rep Test    Norms: for community dwelling men:  71-78 yo=13.2 s     >81 yo= 15.0 s    Norms: for community dwelling women:  71-78 yo= 14.4 s     >81 yo= 16.1 s    Ajay SEXTON., et al. A Short physical [Performance battery assessing lower extremity function: association with self reported disability " "and prediction of mortality and prediction of nursing home placement. J Gerontal. 1994 Mar; 49(2):R06-94.    Timed Up and Go: 15.5"    Normative Reference Values by Age  Age Group Time in Seconds   60 - 69 years  8.1 s   70 - 79 years  9.2 s  80 - 99 years 11.3 s    Cut-off Values Predictive of Falls by:   Group Time in Seconds Community Dwelling Frail Older Adults   >  14 associated with high fall risk   Post-op hip fracture patients at time of discharge  > 24 predictive of falls within 6 months after hip fracture   Frail older adults   > /= 30  predictive of requiring assistive device for ambulation and being dependent in ADLs       General Norms:  <10 s=freely mobile  >20 s=may need AD  >30 s=dependence    Onur ORTIZ. Reference Values for Timed Up and Go test: a descriptive meta-analysis. J Geriatr Phys there. 2006;29(2):64-68.  Melissa received therapeutic exercises to develop strength, endurance and core stabilization for 60 minutes including:    Recumbent bike 2, 15 mins (subjective taken)    Step ups x10 each, moderate rail use (bilateral)  Lat step ups x10 each, significant rail use  Standing march 2x10, mild rail use bilateral   TKE x20 into ball on wall  Leg press 2x10 20#-np    Not performed due to time:  Mini squats at head of table x20  Standing heel raises x20  Long arc quad edge of mat 3x10, 1#  SLR 2x10 (B), 1# Left   Bridge 3x10  DOUBLE KNEE TO CHEST on green ball 20x3"  LOWER TRUNK ROTATION on green ball 20x3" each  Adductor ball squeeze 30x3"  Supine TRANSVERSE RECTUS ABDOMINUS in hooklying with bilateral hip Abduction x20, green band, single hip Abduction x10 each green band  Side lying clams green band 2x10 each  QS on white 1/2 foam 20x3"  Sit to stand 3x5    Home Exercises Provided and Patient Education Provided     Education provided:   - Cont HEP  - post treatment soreness expected    Written Home Exercises Provided: Patient instructed to cont prior HEP. Green and blue band given.  Exercises " "were reviewed and Melissa was able to demonstrate them prior to the end of the session.  Melissa demonstrated good  understanding of the education provided.     See EMR under Patient Instructions for exercises provided 3/3/2022.    Assessment     Melissa is overall deconditioned with respiratory problems, hip, and knee pain. She has significant difficulty getting on recumbent bike and requires use of her own UEs for assistance and unable to perform hip flexion to get legs in stirrups of bike. Strength and endurance is slowly improving as pt tolerates bike sometimes longer. Increased functional ex today. Pain and inabilities are not acceptable for her. Continues on SC. She is set to see [facility]. Will progress pt when appropriate. 2/3 STGs achieved. Pt will cont 1x/week a few weeks pending visit with NP to preserve her yearly insurance benefits. CRT and TUG below age norms.  Melissa Is progressing well towards her goals.   Pt prognosis is Good.     Pt will continue to benefit from skilled outpatient physical therapy to address the deficits listed in the problem list box on initial evaluation, provide pt/family education and to maximize pt's level of independence in the home and community environment.     Pt's spiritual, cultural and educational needs considered and pt agreeable to plan of care and goals.    Anticipated barriers to physical therapy: comorbidities, obese, R TKA, L partial knee    Goals: progressing  Short Term Goals: 4 weeks   -Pt to demo L LE strength grossly 4-/5 or better.-MET  -Pt able to ascend 1-6" step with 3/10 pain or less and enough strength using 1 hand rail.-progressing, not met, 4/10 Left, 6/10 right   -FOTO impairment KNEE score to 68% or better for improving QOL.-MET, 55%     Long Term Goals: 8 weeks   -Pt to demo B LE strength grossly 4/5 or better.-progressing, not met. right hip flexion: 4-/5, right hip extension: 4+/5, left hip flexion: 4-/5, Left hip extension: 4-/5, right knee flexion: " "4+/5; right knee extension: 4/5; left knee flexion: 4+/5; left knee extension: 4/5; right ankle DF: 4/5; right ankle PF: 4+/5 ; Left ankle DF: 4/5; Left ankle PF: 4/5.  -Pt able to ascend/descend 6-6" steps with 3/10 pain or less and enough strength using 1-2 hand rail(s).  -FOTO impairment KNEE score to 53% or better for improving QOL. Progressing, not met. 69%, increased impairment this date, hurting.    Plan     Plan of care Certification: 3/3/2022 to 4/29/2022.  Updated POC: 4/28/2022 to 5/27/2022.     Outpatient Physical Therapy 1-2 times weekly for 4 additional weeks to include the following interventions: Aquatic Therapy, Electrical Stimulation IFC, Gait Training, Manual Therapy, Moist Heat/ Ice, Neuromuscular Re-ed, Patient Education, Self Care, Therapeutic Activities and Therapeutic Exercise.    Lucero Bar, PT      "

## 2022-05-03 ENCOUNTER — CLINICAL SUPPORT (OUTPATIENT)
Dept: REHABILITATION | Facility: HOSPITAL | Age: 67
End: 2022-05-03
Attending: ORTHOPAEDIC SURGERY
Payer: MEDICARE

## 2022-05-03 DIAGNOSIS — Z74.09 DECREASED STRENGTH, ENDURANCE, AND MOBILITY: Primary | ICD-10-CM

## 2022-05-03 DIAGNOSIS — R53.1 DECREASED STRENGTH, ENDURANCE, AND MOBILITY: Primary | ICD-10-CM

## 2022-05-03 DIAGNOSIS — R68.89 DECREASED STRENGTH, ENDURANCE, AND MOBILITY: Primary | ICD-10-CM

## 2022-05-03 PROCEDURE — 97110 THERAPEUTIC EXERCISES: CPT | Mod: PO | Performed by: PHYSICAL THERAPIST

## 2022-05-03 NOTE — PROGRESS NOTES
"  Physical Therapy Daily Treatment Note     Name: Melissa Casas  Clinic Number: [id number]    Therapy Diagnosis:   Encounter Diagnosis   Name Primary?    Decreased strength, endurance, and mobility Yes     Physician: Baljit Cooley MD    Visit Date: 5/3/2022  Physician Orders: PT Eval and Treat   Post Surgical? No Eval and Treat Yes Comment - B KNEE Type of Therapy Outpatient Therapy   Medical Diagnosis from Referral: M17.11 (ICD-10-CM) - Primary osteoarthritis of right knee M17.12 (ICD-10-CM) - Primary osteoarthritis of left knee   Evaluation Date: 3/3/2022  Authorization Period Expiration: 02/24/2023, 12/31/2022  Plan of Care Expiration: 4/29/2022, 5/27/2022  Reassessment: 3/29/2022, 4/28/2022  Visit # / Visits authorized: 1/ 1, 10/20    Time In: 3:00  Time Out: 4:00  Total Billable Time: 45 minutes    Precautions: Standard, CAD, HTN, OP, OA, Asthma    Subjective     Pt reports: "I had to take some pain medicine before coming here because my pain was at 7/10, and it is 5/10 now".   "I am having hip and knee problems. I could hardly walk before taking this."      She was compliant with home exercise program.  Response to previous treatment: no adverse effect  Functional change: too early    Pain: 7/10 pre tx  Location: bilateral knee      Objective     Melissa received therapeutic exercises to develop strength, endurance and core stabilization for 60 minutes including:     Recumbent bike 2, 15 mins (subjective taken)    Step ups x10 each, moderate rail use (bilateral)  Lat step ups x10 each, with Hurrycane   Standing march 2x10, mild rail use bilateral   Leg press 2x10 20#  Mini squats at head of table x20  Standing heel raises x20  Long arc quad edge of mat 3x10, 3#    Not performed due to time:  TKE x20 into ball on wall  Standing heel raises x20  SLR 2x10 (B), 1# Left   Bridge 3x10  DOUBLE KNEE TO CHEST on green ball 20x3"  LOWER TRUNK ROTATION on green ball 20x3" each  Adductor ball squeeze 30x3"  Supine " "TRANSVERSE RECTUS ABDOMINUS in hooklying with bilateral hip Abduction x20, green band, single hip Abduction x10 each green band  Side lying clams green band 2x10 each  QS on white 1/2 foam 20x3"  Sit to stand 3x5    Home Exercises Provided and Patient Education Provided     Education provided:   - Cont HEP  - discussed and pt verbalized understanding of importance of general health   - post treatment soreness expected    Written Home Exercises Provided: Patient instructed to cont prior HEP. Green and blue band given.  Exercises were reviewed and Melissa was able to demonstrate them prior to the end of the session.  Melissa demonstrated good  understanding of the education provided.     See EMR under Patient Instructions for exercises provided 3/3/2022.    Assessment     Melissa is overall deconditioned with respiratory problems, hip, and knee pain. She has significant difficulty getting on recumbent bike and requires use of her own UEs for assistance and PT help and unable to perform hip flexion to get legs in stirrups of bike. Strength and endurance is slowly improving as pt tolerates bike sometimes longer. Pt required rest breaks in between step ups, lateral steps and standing marches due to SOB. Will progress pt when appropriate. 2/3 STGs achieved. Pt will cont 1x/week a few weeks pending visit with NP to preserve her yearly insurance benefits. CRT and TUG below age norms. Bilateral hip and knee pain complaints are limiting.  Melissa Is progressing well towards her goals.   Pt prognosis is Good.     Pt will continue to benefit from skilled outpatient physical therapy to address the deficits listed in the problem list box on initial evaluation, provide pt/family education and to maximize pt's level of independence in the home and community environment.     Pt's spiritual, cultural and educational needs considered and pt agreeable to plan of care and goals.    Anticipated barriers to physical therapy: comorbidities, obese, " "R TKA, L partial knee    Goals: progressing  Short Term Goals: 4 weeks   -Pt to demo L LE strength grossly 4-/5 or better.-MET  -Pt able to ascend 1-6" step with 3/10 pain or less and enough strength using 1 hand rail.-progressing, not met, 4/10 Left, 6/10 right   -FOTO impairment KNEE score to 68% or better for improving QOL.-MET, 55%     Long Term Goals: 8 weeks   -Pt to demo B LE strength grossly 4/5 or better.-progressing, not met. right hip flexion: 4-/5, right hip extension: 4+/5, left hip flexion: 4-/5, Left hip extension: 4-/5, right knee flexion: 4+/5; right knee extension: 4/5; left knee flexion: 4+/5; left knee extension: 4/5; right ankle DF: 4/5; right ankle PF: 4+/5 ; Left ankle DF: 4/5; Left ankle PF: 4/5.  -Pt able to ascend/descend 6-6" steps with 3/10 pain or less and enough strength using 1-2 hand rail(s).  -FOTO impairment KNEE score to 53% or better for improving QOL. Progressing, not met. 69%, increased impairment this date, hurting.    Plan     Plan of care Certification: 3/3/2022 to 4/29/2022.  Updated POC: 4/28/2022 to 5/27/2022.     Outpatient Physical Therapy 1-2 times weekly for 4 additional weeks to include the following interventions: Aquatic Therapy, Electrical Stimulation IFC, Gait Training, Manual Therapy, Moist Heat/ Ice, Neuromuscular Re-ed, Patient Education, Self Care, Therapeutic Activities and Therapeutic Exercise.    Lucero Bar, PT      "

## 2022-05-06 ENCOUNTER — OFFICE VISIT (OUTPATIENT)
Dept: ORTHOPEDICS | Facility: CLINIC | Age: 67
End: 2022-05-06
Payer: MEDICARE

## 2022-05-06 VITALS — BODY MASS INDEX: 45.05 KG/M2 | HEIGHT: 64 IN | WEIGHT: 263.88 LBS

## 2022-05-06 DIAGNOSIS — M17.11 PRIMARY OSTEOARTHRITIS OF RIGHT KNEE: ICD-10-CM

## 2022-05-06 DIAGNOSIS — M17.12 PRIMARY OSTEOARTHRITIS OF LEFT KNEE: ICD-10-CM

## 2022-05-06 DIAGNOSIS — M70.61 TROCHANTERIC BURSITIS OF RIGHT HIP: Primary | ICD-10-CM

## 2022-05-06 DIAGNOSIS — M70.62 GREATER TROCHANTERIC BURSITIS OF LEFT HIP: ICD-10-CM

## 2022-05-06 PROCEDURE — 99214 PR OFFICE/OUTPT VISIT, EST, LEVL IV, 30-39 MIN: ICD-10-PCS | Mod: 25,S$PBB,, | Performed by: NURSE PRACTITIONER

## 2022-05-06 PROCEDURE — 20610 LARGE JOINT ASPIRATION/INJECTION: BILATERAL GREATER TROCHANTERIC BURSA: ICD-10-PCS | Mod: 50,S$PBB,, | Performed by: NURSE PRACTITIONER

## 2022-05-06 PROCEDURE — 99214 OFFICE O/P EST MOD 30 MIN: CPT | Mod: PBBFAC,PN,25 | Performed by: NURSE PRACTITIONER

## 2022-05-06 PROCEDURE — 99214 OFFICE O/P EST MOD 30 MIN: CPT | Mod: 25,S$PBB,, | Performed by: NURSE PRACTITIONER

## 2022-05-06 PROCEDURE — 20610 DRAIN/INJ JOINT/BURSA W/O US: CPT | Mod: 50,S$PBB,, | Performed by: NURSE PRACTITIONER

## 2022-05-06 PROCEDURE — 99999 PR PBB SHADOW E&M-EST. PATIENT-LVL IV: ICD-10-PCS | Mod: PBBFAC,,, | Performed by: NURSE PRACTITIONER

## 2022-05-06 PROCEDURE — 99999 PR PBB SHADOW E&M-EST. PATIENT-LVL IV: CPT | Mod: PBBFAC,,, | Performed by: NURSE PRACTITIONER

## 2022-05-06 PROCEDURE — 20610 DRAIN/INJ JOINT/BURSA W/O US: CPT | Mod: 50,PBBFAC,PN | Performed by: NURSE PRACTITIONER

## 2022-05-06 RX ADMIN — TRIAMCINOLONE ACETONIDE 40 MG: 40 INJECTION, SUSPENSION INTRA-ARTICULAR; INTRAMUSCULAR at 10:05

## 2022-05-07 RX ORDER — TRIAMCINOLONE ACETONIDE 40 MG/ML
40 INJECTION, SUSPENSION INTRA-ARTICULAR; INTRAMUSCULAR
Status: DISCONTINUED | OUTPATIENT
Start: 2022-05-06 | End: 2022-05-07 | Stop reason: HOSPADM

## 2022-05-07 NOTE — PROGRESS NOTES
Chief Complaint   Patient presents with    Right Hip - Pain         HPI:   This is a 66 y.o. F who presents to clinic today complaining of right knee pain for 2 weeks after no known trauma. Pain is progressively worsening. No numbness or tingling. No associated signs or symptoms. She previously had injection to her hip with MD Yasir and is back today as she needs hips injected again. 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     Past Medical History:   Diagnosis Date    Anxiety     Asthma     CAD (coronary artery disease)     Chronic pain     Coronary artery disease     Hyperlipidemia     Osteopenia     Pneumonia     Primary hypertension     Primary osteoarthritis of left knee 6/24/2021    Thyroid disease      Past Surgical History:   Procedure Laterality Date    CORONARY ARTERY BYPASS GRAFT      double     JOINT REPLACEMENT Right     total    JOINT REPLACEMENT Left     patital      Current Outpatient Medications on File Prior to Visit   Medication Sig Dispense Refill    ALPRAZolam (XANAX) 0.5 MG tablet Take 1 tablet (0.5 mg total) by mouth 2 (two) times daily as needed for Anxiety. 60 tablet 2    ascorbic acid, vitamin C, (VITAMIN C) 500 MG tablet Take 500 mg by mouth once daily.      atorvastatin (LIPITOR) 80 MG tablet Take 1 tablet (80 mg total) by mouth once daily. 90 tablet 1    BREO ELLIPTA 100-25 mcg/dose diskus inhaler USE 1 INHALATION ONCE DAILY (CONTROLLER) 60 each 3    calcium carbonate (OS-RAY) 600 mg calcium (1,500 mg) Tab Take 600 mg by mouth once.      cholecalciferol, vitamin D3, 1,250 mcg (50,000 unit) capsule Take 50,000 Units by mouth once daily.      COLLAGEN MISC Take 5,000 mg by mouth 2 (two) times a day.      diclofenac sodium (VOLTAREN) 1 % Gel Apply 2 g topically once daily.      dicyclomine (BENTYL) 10 MG capsule Take 1 capsule (10 mg total) by mouth 3 (three) times daily as needed (cramping). 30 capsule 3    diltiaZEM (CARDIZEM  CD) 120 MG Cp24 Take 1 capsule (120 mg total) by mouth once daily. 90 capsule 3    estradioL (VAGIFEM) 10 mcg Tab Place 1 tablet (10 mcg total) vaginally twice a week. 24 tablet 3    fluticasone propionate (FLONASE) 50 mcg/actuation nasal spray 1 spray (50 mcg total) by Each Nostril route 2 (two) times a day. 48 g 3    levocetirizine (XYZAL) 5 MG tablet Take 5 mg by mouth every evening.      levothyroxine (SYNTHROID) 150 MCG tablet Take 1 tablet (150 mcg total) by mouth before breakfast. 90 tablet 3    magnesium oxide 500 mg Tab Take 1 tablet by mouth once daily.       metoprolol succinate (TOPROL-XL) 25 MG 24 hr tablet Take 1 tablet (25 mg total) by mouth once daily. 90 tablet 3    montelukast (SINGULAIR) 10 mg tablet Take 1 tablet (10 mg total) by mouth every evening. 90 tablet 3    multivitamin (THERAGRAN) per tablet Take 1 tablet by mouth once daily.      nitroGLYCERIN 0.4 MG/DOSE TL SPRY (NITROLINGUAL) 400 mcg/spray spray Place 2 sprays under the tongue every 5 (five) minutes as needed for Chest pain. 0.004 g 12    raloxifene (EVISTA) 60 mg tablet Take 1 tablet (60 mg total) by mouth once daily. 90 tablet 3    ramipriL (ALTACE) 5 MG capsule Take 1 capsule (5 mg total) by mouth once daily. 90 capsule 3    ranolazine (RANEXA) 500 MG Tb12 Take 2 tablets (1,000 mg total) by mouth 2 (two) times a day. 180 tablet 3     No current facility-administered medications on file prior to visit.     Review of patient's allergies indicates:   Allergen Reactions    Compazine [prochlorperazine edisylate]      Family History   Problem Relation Age of Onset    Atrial fibrillation Mother     Thyroid disease Mother     Osteoporosis Mother     Anxiety disorder Mother     Heart disease Father     Anxiety disorder Father     Mental illness Maternal Uncle         schizophrenia     Anxiety disorder Maternal Grandmother     Ulcers Maternal Grandmother     Cancer Paternal Aunt     Diabetes Paternal Aunt      Social  History     Socioeconomic History    Marital status:    Tobacco Use    Smoking status: Never Smoker    Smokeless tobacco: Never Used   Substance and Sexual Activity    Alcohol use: Not Currently       Review of Systems:  Constitutional:  Denies fever or chills   Eyes:  Denies change in visual acuity   HENT:  Denies nasal congestion or sore throat   Respiratory:  Denies cough or shortness of breath   Cardiovascular:  Denies chest pain or edema   GI:  Denies abdominal pain, nausea, vomiting, bloody stools or diarrhea   :  Denies dysuria   Integument:  Denies rash   Neurologic:  Denies headache, focal weakness or sensory changes   Endocrine:  Denies polyuria or polydipsia   Lymphatic:  Denies swollen glands   Psychiatric:  Denies depression or anxiety     Physical Exam:   Constitutional:  Well developed, well nourished, no acute distress, non-toxic appearance   Integument:  Well hydrated, no rash   Lymphatic:  No lymphadenopathy noted   Neurologic:  Alert & oriented x 3, CN 2-12 normal, normal motor function, normal sensory function, no focal deficits noted   Psychiatric:  Speech and behavior appropriate   Eyes: EOMI  Gi: abdomen soft      Bilateral Hip Exam Performed    Bilateral Hip Exam     Tenderness   The patient is experiencing tenderness in the greater trochanter.    Range of Motion   The patient has normal hip ROM.    Muscle Strength   Abduction: 4/5     Other   Erythema: absent  Sensation: normal  Pulse: present      X-rays were performed today, personally reviewed by me and findings discussed with the patient.  2 views of the bilateral hip show no fractures or dislocations                Trochanteric bursitis of right hip  -     Large Joint Aspiration/Injection: bilateral greater trochanteric bursa    Primary osteoarthritis of right knee  -     Ambulatory referral/consult to Physical/Occupational Therapy; Future; Expected date: 05/13/2022  -     X-ray Knee Ortho Bilateral; Future; Expected date:  05/06/2022    Primary osteoarthritis of left knee  -     Ambulatory referral/consult to Physical/Occupational Therapy; Future; Expected date: 05/13/2022  -     X-ray Knee Ortho Bilateral; Future; Expected date: 05/06/2022    Greater trochanteric bursitis of left hip  -     Large Joint Aspiration/Injection: bilateral greater trochanteric bursa      We discussed in details and reviewed her prior bilateral knee xrays- all questions answered.   Using an aseptic technique, I injected 5 cc of lidocaine 1% without and 1 cc of kenalog 40mg into the bilateral Hip. The patient tolerated this well.   She may return to clinic in 6 weeks or as needed.

## 2022-05-07 NOTE — PROCEDURES
Large Joint Aspiration/Injection: bilateral greater trochanteric bursa    Date/Time: 5/6/2022 10:40 AM  Performed by: MUKESH Steven  Authorized by: MUKESH Steven     Consent Done?:  Yes (Verbal)  Indications:  Pain  Timeout: prior to procedure the correct patient, procedure, and site was verified    Prep: patient was prepped and draped in usual sterile fashion      Local anesthesia used?: Yes    Local anesthetic:  Lidocaine 1% without epinephrine  Anesthetic total (ml):  5      Details:  Needle Size:  21 G  Approach:  Lateral  Location:  Hip  Laterality:  Bilateral  Site:  Bilateral greater trochanteric bursa  Medications (Right):  40 mg triamcinolone acetonide 40 mg/mL  Medications (Left):  40 mg triamcinolone acetonide 40 mg/mL  Patient tolerance:  Patient tolerated the procedure well with no immediate complications

## 2022-06-15 DIAGNOSIS — M17.11 PRIMARY OSTEOARTHRITIS OF RIGHT KNEE: ICD-10-CM

## 2022-06-15 DIAGNOSIS — M17.12 PRIMARY OSTEOARTHRITIS OF LEFT KNEE: Primary | ICD-10-CM

## 2022-06-22 NOTE — PROGRESS NOTES
Reassessment/Physical Therapy Daily Treatment Note     Name: Melissa Casas  Clinic Number: [id number]    Therapy Diagnosis:   Encounter Diagnoses   Name Primary?    Primary osteoarthritis of right knee     Primary osteoarthritis of left knee     Decreased strength, endurance, and mobility Yes     Physician: Mary Anne Bowie FNP    Visit Date: 6/23/2022  Physician Orders: PT Eval and Treat   Post Surgical? No Eval and Treat Yes Comment - B KNEE Type of Therapy Outpatient Therapy   Medical Diagnosis from Referral: M17.11 (ICD-10-CM) - Primary osteoarthritis of right knee M17.12 (ICD-10-CM) - Primary osteoarthritis of left knee   Evaluation Date: 3/3/2022  Authorization Period Expiration: 02/24/2023, 12/31/2022  Plan of Care Expiration: 4/29/2022, 5/27/2022, 7/22/2022  Reassessment: 3/29/2022, 4/28/2022, 6/23/2022  Visit # / Visits authorized: 1/ 1, 9/20, 1/1, 0/20    Time In: 3:05  Time Out: 4:00  Total Billable Time: 45 minutes    Precautions: Standard, CAD, HTN, OP, OA, Asthma    Subjective     Pt reports: she received injections to bilateral hips and no pain. She is working with cardiology due to chest spasms. She just came from cardio with HBP, but feels ok to cont. See chart for those results.     She was compliant with home exercise program.  Response to previous treatment: no adverse effect  Functional change: too early    Pain: 0/10 pre tx and post tx  Location: bilateral hips     Objective   Observation: obese, walking with cane, but also with w/c present next to her.     Posture: pes planus (B)     Knee AROM:  R: 0-110 deg  L: 0-117 deg    Hip AROM: tba    Chair rise test:    10 Rep Test-67 yo: 6/23/2022: 35.6 s    Age   Men   Women    65   18.4     17.6    70   19.9     18.5    75   20.1     19.5    80   20.9     20.5    85   21.8     21.5    Eugene Ospina. Simple method for measuring or lower extremity muscle strength. AM J Med 1985 Attila:78(1):77-81.     Lower Extremity Strength  Right LE   Left LE      Knee extension: 4/5 Knee extension: 4/5   Knee flexion: 4/5 Knee flexion: 4/5   Hip flexion: 4/5 Hip flexion: 3+/5   Hip abduction: 3/5 Hip abduction: 4/5   Hip adduction: 4/5 Hip adduction 4-/5   Ankle dorsiflexion: 4/5 Ankle dorsiflexion: 4/5   Ankle plantarflexion: 4/5 Ankle plantarflexion: 4/5      CMS Impairment/Limitation/Restriction for FOTO KNEE Survey     Therapist reviewed FOTO scores for Melissa Casas on 3/3/2022.   FOTO documents entered into [employer] - see Media section.     Limitation Score: 74%  Goal: 53%  4/12/2022: 55%  4/28/2022: 65%  6/23/2022: 59%      Melissa received therapeutic exercises to develop strength, endurance and core stabilization for 55 minutes including:     Recumbent bike 2, 15 mins (subjective taken)-np    Reassessment.  Bridging 2x10  Side lying hip Abduction x10 each  Seated march 2x10  Seated LONG ARC QUAD x2 mins  Standing march 2x10 at head of table    Home Exercises Provided and Patient Education Provided     Education provided:   - Cont HEP, progress HEP  - POC    Written Home Exercises Provided: Patient instructed to cont prior HEP. Green and blue band given.  Exercises were reviewed and Melissa was able to demonstrate them prior to the end of the session.  Melissa demonstrated good  understanding of the education provided.     See EMR under Patient Instructions for exercises provided 3/3/2022.    Assessment     Melissa is overall deconditioned with respiratory, cardiovascular problems, hip, and knee pain making strength progression difficult. She has significant difficulty getting on recumbent bike and requires use of her own UEs for assistance and PT help and unable to perform hip flexion to get legs in stirrups of bike. Strength and endurance is slowly improving as pt tolerates bike sometimes longer. Pt required rest breaks in between exercises. She was referred back to PT towards weaning to home program and specifically for the hip pain. She is not having hip pain since  "receiving injections. Will progress pt when appropriate. Pt will cont 1x/week a few weeks pending change.   Melissa Is progressing well towards her goals.   Pt prognosis is Good.     Pt will continue to benefit from skilled outpatient physical therapy to address the deficits listed in the problem list box on initial evaluation, provide pt/family education and to maximize pt's level of independence in the home and community environment.     Pt's spiritual, cultural and educational needs considered and pt agreeable to plan of care and goals.    Anticipated barriers to physical therapy: comorbidities, obese, R TKA, L partial knee    Goals: progressing  Short Term Goals: 4 weeks   -Pt to demo L LE strength grossly 4-/5 or better.-MET  -Pt able to ascend 1-6" step with 3/10 pain or less and enough strength using 1 hand rail.-progressing, not met.  -FOTO impairment KNEE score to 68% or better for improving QOL.-MET, 55%     Long Term Goals: 8 weeks   -Pt to demo B LE strength grossly 4/5 or better.-progressing, not met.   6/23/2022:  Right LE   Left LE     Knee extension: 4/5 Knee extension: 4/5   Knee flexion: 4/5 Knee flexion: 4/5   Hip flexion: 4/5 Hip flexion: 3+/5   Hip abduction: 3/5 Hip abduction: 4/5   Hip adduction: 4/5 Hip adduction 4-/5   Ankle dorsiflexion: 4/5 Ankle dorsiflexion: 4/5   Ankle plantarflexion: 4/5 Ankle plantarflexion: 4/5     -Pt able to ascend/descend 6-6" steps with 3/10 pain or less and enough strength using 1-2 hand rail(s).not met progressing.  -FOTO impairment KNEE score to 53% or better for improving QOL. Not met progressing.    Pt will be able to walk between rooms with little or no difficulty.      Plan     Plan of care Certification: 3/3/2022 to 4/29/2022.  Updated POC#1: 4/28/2022 to 5/27/2022.  Updated POC #2: 6/23/2022 to 7/22/2022.     Outpatient Physical Therapy 1-2 times weekly for 4 additional weeks to include the following interventions: Aquatic Therapy, Electrical Stimulation " IFC, Gait Training, Manual Therapy, Moist Heat/ Ice, Neuromuscular Re-ed, Patient Education, Self Care, Therapeutic Activities and Therapeutic Exercise.    Lucero Bar, PT

## 2022-06-23 ENCOUNTER — OFFICE VISIT (OUTPATIENT)
Dept: CARDIOLOGY | Facility: CLINIC | Age: 67
End: 2022-06-23
Payer: MEDICARE

## 2022-06-23 ENCOUNTER — CLINICAL SUPPORT (OUTPATIENT)
Dept: REHABILITATION | Facility: HOSPITAL | Age: 67
End: 2022-06-23
Payer: MEDICARE

## 2022-06-23 VITALS
DIASTOLIC BLOOD PRESSURE: 73 MMHG | HEART RATE: 74 BPM | BODY MASS INDEX: 45.2 KG/M2 | HEIGHT: 64 IN | SYSTOLIC BLOOD PRESSURE: 141 MMHG | WEIGHT: 264.75 LBS

## 2022-06-23 DIAGNOSIS — R94.39 ABNORMAL STRESS TEST: ICD-10-CM

## 2022-06-23 DIAGNOSIS — M17.12 PRIMARY OSTEOARTHRITIS OF LEFT KNEE: ICD-10-CM

## 2022-06-23 DIAGNOSIS — E66.01 MORBID OBESITY: ICD-10-CM

## 2022-06-23 DIAGNOSIS — I25.10 CORONARY ARTERY DISEASE, UNSPECIFIED VESSEL OR LESION TYPE, UNSPECIFIED WHETHER ANGINA PRESENT, UNSPECIFIED WHETHER NATIVE OR TRANSPLANTED HEART: Primary | ICD-10-CM

## 2022-06-23 DIAGNOSIS — R94.39 ABNORMAL NUCLEAR STRESS TEST: ICD-10-CM

## 2022-06-23 DIAGNOSIS — I10 PRIMARY HYPERTENSION: ICD-10-CM

## 2022-06-23 DIAGNOSIS — I20.9 ANGINA PECTORIS: ICD-10-CM

## 2022-06-23 DIAGNOSIS — R68.89 DECREASED STRENGTH, ENDURANCE, AND MOBILITY: Primary | ICD-10-CM

## 2022-06-23 DIAGNOSIS — Z74.09 DECREASED STRENGTH, ENDURANCE, AND MOBILITY: Primary | ICD-10-CM

## 2022-06-23 DIAGNOSIS — M17.11 PRIMARY OSTEOARTHRITIS OF RIGHT KNEE: ICD-10-CM

## 2022-06-23 DIAGNOSIS — R53.1 DECREASED STRENGTH, ENDURANCE, AND MOBILITY: Primary | ICD-10-CM

## 2022-06-23 DIAGNOSIS — E78.5 HYPERLIPIDEMIA, UNSPECIFIED HYPERLIPIDEMIA TYPE: ICD-10-CM

## 2022-06-23 DIAGNOSIS — I35.0 AORTIC VALVE STENOSIS, ETIOLOGY OF CARDIAC VALVE DISEASE UNSPECIFIED: Primary | ICD-10-CM

## 2022-06-23 PROCEDURE — 99214 PR OFFICE/OUTPT VISIT, EST, LEVL IV, 30-39 MIN: ICD-10-PCS | Mod: S$PBB,,, | Performed by: INTERNAL MEDICINE

## 2022-06-23 PROCEDURE — 99999 PR PBB SHADOW E&M-EST. PATIENT-LVL III: CPT | Mod: PBBFAC,,, | Performed by: INTERNAL MEDICINE

## 2022-06-23 PROCEDURE — 99999 PR PBB SHADOW E&M-EST. PATIENT-LVL III: ICD-10-PCS | Mod: PBBFAC,,, | Performed by: INTERNAL MEDICINE

## 2022-06-23 PROCEDURE — 97110 THERAPEUTIC EXERCISES: CPT | Mod: PO | Performed by: PHYSICAL THERAPIST

## 2022-06-23 PROCEDURE — 99213 OFFICE O/P EST LOW 20 MIN: CPT | Mod: PBBFAC,PO | Performed by: INTERNAL MEDICINE

## 2022-06-23 PROCEDURE — 99214 OFFICE O/P EST MOD 30 MIN: CPT | Mod: S$PBB,,, | Performed by: INTERNAL MEDICINE

## 2022-06-23 RX ORDER — SODIUM CHLORIDE 0.9 % (FLUSH) 0.9 %
10 SYRINGE (ML) INJECTION
Status: DISCONTINUED | OUTPATIENT
Start: 2022-06-23 | End: 2022-08-22

## 2022-06-23 RX ORDER — SODIUM CHLORIDE 9 MG/ML
INJECTION, SOLUTION INTRAVENOUS ONCE
Status: CANCELLED | OUTPATIENT
Start: 2022-06-23 | End: 2022-06-23

## 2022-06-23 RX ORDER — ISOSORBIDE MONONITRATE 30 MG/1
30 TABLET, EXTENDED RELEASE ORAL DAILY
Qty: 30 TABLET | Refills: 11 | Status: SHIPPED | OUTPATIENT
Start: 2022-06-23 | End: 2022-07-28 | Stop reason: SDUPTHER

## 2022-06-23 NOTE — PROGRESS NOTES
"Subjective:    Patient ID:  Melissa Casas is a 66 y.o. female who presents for evaluation of No chief complaint on file.      HPI66 yo WF with long hx of CAD. Had multiple stents and eventually a double bypass to the RCA according to patient. All procedures were in [place]. Because of symptoms had nuclear stress test in October of last year with small area of reversibility. Have tried to treat her medically but continues to use NTG frequently. Heart function normal but mild aortic stenosis    Review of Systems   Cardiovascular: Positive for chest pain and dyspnea on exertion. Negative for claudication, cyanosis, irregular heartbeat, leg swelling, near-syncope, orthopnea, palpitations and syncope.   Respiratory: Positive for shortness of breath.         Objective:    Physical Exam  Vitals and nursing note reviewed.   Constitutional:       Appearance: She is well-developed.      Comments: BP (!) 141/73 (BP Location: Right arm, Patient Position: Sitting, BP Method: Large (Automatic))   Pulse 74   Ht 5' 4" (1.626 m)   Wt 120.1 kg (264 lb 12.4 oz)   BMI 45.45 kg/m²      HENT:      Head: Normocephalic and atraumatic.      Right Ear: External ear normal.      Left Ear: External ear normal.      Nose: Nose normal.   Eyes:      General: Lids are normal. No scleral icterus.        Right eye: No discharge.         Left eye: No discharge.      Conjunctiva/sclera: Conjunctivae normal.      Right eye: No hemorrhage.     Pupils: Pupils are equal, round, and reactive to light.   Neck:      Thyroid: No thyromegaly.      Vascular: No JVD.      Trachea: No tracheal deviation.   Cardiovascular:      Rate and Rhythm: Normal rate and regular rhythm.      Pulses: Intact distal pulses.      Heart sounds: Murmur heard.    Harsh midsystolic murmur is present with a grade of 2/6 at the upper right sternal border radiating to the neck.    No friction rub. No gallop.   Pulmonary:      Effort: Pulmonary effort is normal. No respiratory " distress.      Breath sounds: Normal breath sounds. No wheezing or rales.   Chest:      Chest wall: No tenderness.   Breasts: Breasts are symmetrical.       Abdominal:      General: Bowel sounds are normal. There is no distension.      Palpations: Abdomen is soft. There is no hepatomegaly or mass.      Tenderness: There is no abdominal tenderness. There is no guarding or rebound.   Musculoskeletal:         General: No tenderness. Normal range of motion.      Cervical back: Normal range of motion and neck supple.   Lymphadenopathy:      Cervical: No cervical adenopathy.   Skin:     General: Skin is warm and dry.      Coloration: Skin is not pale.      Findings: No erythema or rash.   Neurological:      Mental Status: She is alert and oriented to person, place, and time.      Cranial Nerves: No cranial nerve deficit.      Coordination: Coordination normal.      Deep Tendon Reflexes: Reflexes normal.   Psychiatric:         Behavior: Behavior normal.         Thought Content: Thought content normal.         Judgment: Judgment normal.           Assessment:       1. Aortic valve stenosis, etiology of cardiac valve disease unspecified    2. Hyperlipidemia, unspecified hyperlipidemia type    3. Primary hypertension    4. Morbid obesity    5. Angina pectoris    6. Abnormal nuclear stress test         Plan:     Will add imdur but patient already on beta blocker, calcium channel blocker and renexa with increase frequency of CP    Will schedule repeat angiogram   Orders Placed This Encounter   Procedures    Echo     Follow up in about 3 months (around 9/23/2022).

## 2022-06-23 NOTE — PATIENT INSTRUCTIONS
Angiogram 7/11 at 8 am    Arrive for procedure at: [facility]    You will receive a phone call from [facility] Pre-Op Department with further instructions prior to your scheduled procedure.    Notify the nurse if you are ALLERGIC TO IODINE.    FASTING: You MAY NOT have anything to eat or drink AFTER MIDNIGHT the day before your procedure. If your procedure is scheduled in the afternoon, you may have a LIGHT BREAKFAST 6-8 hours prior to your procedure.  For example: Two slices of toast; black coffee or black tea.    MEDICATIONS: You may take your regular morning medications with water. If there are any medications that you should not take, you will be instructed to hold them for that morning.    CARDIOLOGY PRE-PROCEDURE MEDICATION ORDERS:  ** Please hold any medications that are checked below:    HOLD   # OF DAYS TO HOLD  Coumadin   Consult with Coumadin Clinic   Xarelto    _DAY BEFORE & DAY OF_  Pradaxa  _ DAY BEFORE & DAY OF _  Eliquis   _ DAY BEFORE & DAY OF _  Metformin    Day before procedure & morning of procedure  Short acting insulin   Morning of procedure    CONTINUE the Following Medications   Plavix      Effient     Aspirin    WHAT TO EXPECT:    How long will the procedure take?  The procedure will take an average of 1 - 2 hours to perform.  After the procedure, you will need to lay flat for around 4 - 6 hours to minimize bleeding from the puncture site. If the wrist is accessed you will need to keep your arm still as instructed by the nurse.    When can I go home?  You may be able to be discharged home that same afternoon if there were no complications.  If you have one of the following: balloon; stent; pacemaker or defibrillator procedures, you may spend one night for observation.  Your doctor will determine your discharge based upon your progress.  The results of your procedure will be discussed with you before you are discharged.  Any further testing or procedures will be scheduled for  you either before you leave or you will be instructed to call for a future appointment.      TRANSPORTATION:  PLEASE ARRANGE TO HAVE SOMEONE DRIVE YOU HOME FOLLOWING YOUR PROCEDURE, YOU WILL NOT BE ALLOWED TO DRIVE.

## 2022-06-23 NOTE — PLAN OF CARE
Outpatient Therapy Updated Plan of Care     Visit Date: 6/23/2022  Name: Melissa Casas  Clinic Number: [id number]    Therapy Diagnosis:   Encounter Diagnoses   Name Primary?    Primary osteoarthritis of right knee     Primary osteoarthritis of left knee     Decreased strength, endurance, and mobility Yes     Physician: Mary Anne Bowie FNP    Physician Orders: PT Eval and Treat   Post Surgical? No Eval and Treat Yes Comment - B KNEE Type of Therapy Outpatient Therapy   Medical Diagnosis from Referral: M17.11 (ICD-10-CM) - Primary osteoarthritis of right knee M17.12 (ICD-10-CM) - Primary osteoarthritis of left knee   Evaluation Date: 3/3/2022    Total Visits Received: 11  Cancelled Visits: a few  No Show Visits: 0    Current Certification Period:  4/29/2022 to 5/27/2022  Precautions:  Standard, CAD, HTN, OP, OA, Asthma  Visits from Evaluation Date:  10  Functional Level Prior to Evaluation:  Impaired walking with knee and hip pain bilaterally.    Subjective     Update:   Pt reports: she received injections to bilateral hips and no pain. She is working with cardiology due to chest spasms. She just came from cardio with HBP, but feels ok to cont. See chart for those results.      She was compliant with home exercise program.  Response to previous treatment: no adverse effect  Functional change: too early     Pain: 0/10 pre tx and post tx  Location: bilateral hips     Objective     Update:   Observation: obese, walking with cane, but also with w/c present next to her.     Posture: pes planus (B)     Knee AROM:  R: 0-110 deg  L: 0-117 deg     Hip AROM: tba     Chair rise test:     10 Rep Test-67 yo: 6/23/2022: 35.6 s     Age                              Men                             Women     65                                18.4                               17.6     70                                19.9                               18.5     75                                20.1                                19.5     80                                20.9                               20.5     85                                21.8                               21.5     Wolf MORA, Eugene SZYMANSKI. Simple method for measuring or lower extremity muscle strength. AM J Med 1985 Attila:78(1):77-81.     Lower Extremity Strength  Right LE   Left LE     Knee extension: 4/5 Knee extension: 4/5   Knee flexion: 4/5 Knee flexion: 4/5   Hip flexion: 4/5 Hip flexion: 3+/5   Hip abduction: 3/5 Hip abduction: 4/5   Hip adduction: 4/5 Hip adduction 4-/5   Ankle dorsiflexion: 4/5 Ankle dorsiflexion: 4/5   Ankle plantarflexion: 4/5 Ankle plantarflexion: 4/5      CMS Impairment/Limitation/Restriction for FOTO KNEE Survey     Therapist reviewed FOTO scores for Melissa Casas on 3/3/2022.   FOTO documents entered into [employer] - see Media section.     Limitation Score: 74%  Goal: 53%  4/12/2022: 55%  4/28/2022: 65%  6/23/2022: 59%      Melissa received therapeutic exercises to develop strength, endurance and core stabilization for 60 minutes including:     Recumbent bike 2, 15 mins (subjective taken)-np     Reassessment.  Bridging 2x10  Side lying hip Abduction x10 each  Seated march 2x10  Seated LONG ARC QUAD x2 mins  Standing march 2x10 at head of table     Home Exercises Provided and Patient Education Provided      Education provided:   - Cont HEP, progress HEP  - POC     Written Home Exercises Provided: Patient instructed to cont prior HEP. Green and blue band given.  Exercises were reviewed and Melissa was able to demonstrate them prior to the end of the session.  Melissa demonstrated good  understanding of the education provided.      See EMR under Patient Instructions for exercises provided 3/3/2022.    Assessment     Update:   Melissa is overall deconditioned with respiratory, cardiovascular problems, hip, and knee pain making strength progression difficult. She has significant difficulty getting on recumbent bike and requires use of her own UEs for  "assistance and PT help and unable to perform hip flexion to get legs in stirrups of bike. Strength and endurance is slowly improving as pt tolerates bike sometimes longer. Pt required rest breaks in between exercises. She was referred back to PT towards weaning to home program and specifically for the hip pain. She is not having hip pain since receiving injections. Will progress pt when appropriate. Pt will cont 1x/week a few weeks pending change.   Melissa Is progressing well towards her goals.   Pt prognosis is Good.      Pt will continue to benefit from skilled outpatient physical therapy to address the deficits listed in the problem list box on initial evaluation, provide pt/family education and to maximize pt's level of independence in the home and community environment.      Pt's spiritual, cultural and educational needs considered and pt agreeable to plan of care and goals.     Anticipated barriers to physical therapy: comorbidities, obese, R TKA, L partial knee     Previous Short Term Goals Status:     Short Term Goals: 4 weeks   -Pt to demo L LE strength grossly 4-/5 or better.-MET  -Pt able to ascend 1-6" step with 3/10 pain or less and enough strength using 1 hand rail.-progressing, not met.  -FOTO impairment KNEE score to 68% or better for improving QOL.-MET, 55%     New Short Term Goals Status:   Continue to previous goals.  Long Term Goal Status:   modified:    Long Term Goals: 8 weeks   -Pt to demo B LE strength grossly 4/5 or better.-progressing, not met.   6/23/2022:  Right LE   Left LE     Knee extension: 4/5 Knee extension: 4/5   Knee flexion: 4/5 Knee flexion: 4/5   Hip flexion: 4/5 Hip flexion: 3+/5   Hip abduction: 3/5 Hip abduction: 4/5   Hip adduction: 4/5 Hip adduction 4-/5   Ankle dorsiflexion: 4/5 Ankle dorsiflexion: 4/5   Ankle plantarflexion: 4/5 Ankle plantarflexion: 4/5      -Pt able to ascend/descend 6-6" steps with 3/10 pain or less and enough strength using 1-2 hand rail(s).not met " progressing.  -FOTO impairment KNEE score to 53% or better for improving QOL. Not met progressing.    Pt will be able to walk between rooms with little or no difficulty.    Reasons for Recertification of Therapy:   Pt has received hip injections and is without pain. She continues lacking LE strength as above and has comorbidities impacting this.    Plan     Updated Certification Period: 6/23/2022 to 7/22/2022  Recommended Treatment Plan: 1-2 times per week for 4 weeks: Aquatic Therapy, Electrical Stimulation IFC, Gait Training, Manual Therapy, Moist Heat/ Ice, Neuromuscular Re-ed, Patient Education, Self Care, Therapeutic Activities, Therapeutic Exercise, Ultrasound and dry needling  Other Recommendations: elizabeth Bar, PT  6/23/2022      I CERTIFY THE NEED FOR THESE SERVICES FURNISHED UNDER THIS PLAN OF TREATMENT AND WHILE UNDER MY CARE    Physician's comments:        Physician's Signature: ___________________________________________________

## 2022-06-24 ENCOUNTER — CLINICAL SUPPORT (OUTPATIENT)
Dept: CARDIOLOGY | Facility: HOSPITAL | Age: 67
End: 2022-06-24
Attending: INTERNAL MEDICINE
Payer: MEDICARE

## 2022-06-24 VITALS — HEART RATE: 83 BPM | HEIGHT: 64 IN | BODY MASS INDEX: 45.07 KG/M2 | WEIGHT: 264 LBS

## 2022-06-24 LAB
ASCENDING AORTA: 1.67 CM
AV INDEX (PROSTH): 0.51
AV MEAN GRADIENT: 16 MMHG
AV PEAK GRADIENT: 25 MMHG
AV VALVE AREA: 1.64 CM2
AV VELOCITY RATIO: 0.45
BSA FOR ECHO PROCEDURE: 2.33 M2
CV ECHO LV RWT: 0.35 CM
DOP CALC AO PEAK VEL: 2.48 M/S
DOP CALC AO VTI: 52.63 CM
DOP CALC LVOT AREA: 3.2 CM2
DOP CALC LVOT DIAMETER: 2.02 CM
DOP CALC LVOT PEAK VEL: 1.12 M/S
DOP CALC LVOT STROKE VOLUME: 86.2 CM3
DOP CALCLVOT PEAK VEL VTI: 26.91 CM
E WAVE DECELERATION TIME: 160.89 MSEC
E/A RATIO: 1.77
E/E' RATIO: 14 M/S
ECHO LV POSTERIOR WALL: 0.86 CM (ref 0.6–1.1)
EJECTION FRACTION: 65 %
FRACTIONAL SHORTENING: 36 % (ref 28–44)
INTERVENTRICULAR SEPTUM: 0.82 CM (ref 0.6–1.1)
IVRT: 85.63 MSEC
LA MAJOR: 5.83 CM
LA MINOR: 5.27 CM
LA WIDTH: 3.68 CM
LEFT ATRIUM SIZE: 3.3 CM
LEFT ATRIUM VOLUME INDEX: 26 ML/M2
LEFT ATRIUM VOLUME: 57.14 CM3
LEFT INTERNAL DIMENSION IN SYSTOLE: 3.17 CM (ref 2.1–4)
LEFT VENTRICLE DIASTOLIC VOLUME INDEX: 53.03 ML/M2
LEFT VENTRICLE DIASTOLIC VOLUME: 116.67 ML
LEFT VENTRICLE MASS INDEX: 65 G/M2
LEFT VENTRICLE SYSTOLIC VOLUME INDEX: 18.2 ML/M2
LEFT VENTRICLE SYSTOLIC VOLUME: 40.07 ML
LEFT VENTRICULAR INTERNAL DIMENSION IN DIASTOLE: 4.97 CM (ref 3.5–6)
LEFT VENTRICULAR MASS: 143.14 G
LV LATERAL E/E' RATIO: 12.09 M/S
LV SEPTAL E/E' RATIO: 16.63 M/S
MV A" WAVE DURATION": 13.7 MSEC
MV PEAK A VEL: 0.75 M/S
MV PEAK E VEL: 1.33 M/S
MV STENOSIS PRESSURE HALF TIME: 46.66 MS
MV VALVE AREA P 1/2 METHOD: 4.71 CM2
PISA MRMAX VEL: 0.06 M/S
PISA TR MAX VEL: 2.89 M/S
PULM VEIN S/D RATIO: 0.86
PV PEAK D VEL: 0.86 M/S
PV PEAK S VEL: 0.74 M/S
RA MAJOR: 4.17 CM
RA PRESSURE: 3 MMHG
RA WIDTH: 2.73 CM
RIGHT VENTRICULAR END-DIASTOLIC DIMENSION: 3.93 CM
RV TISSUE DOPPLER FREE WALL SYSTOLIC VELOCITY 1 (APICAL 4 CHAMBER VIEW): 8.72 CM/S
SINUS: 2.29 CM
STJ: 1.93 CM
TDI LATERAL: 0.11 M/S
TDI SEPTAL: 0.08 M/S
TDI: 0.1 M/S
TR MAX PG: 33 MMHG
TRICUSPID ANNULAR PLANE SYSTOLIC EXCURSION: 1.62 CM
TV REST PULMONARY ARTERY PRESSURE: 36 MMHG

## 2022-06-24 PROCEDURE — 93306 TTE W/DOPPLER COMPLETE: CPT | Mod: PO

## 2022-06-29 ENCOUNTER — CLINICAL SUPPORT (OUTPATIENT)
Dept: REHABILITATION | Facility: HOSPITAL | Age: 67
End: 2022-06-29
Payer: MEDICARE

## 2022-06-29 DIAGNOSIS — R68.89 DECREASED STRENGTH, ENDURANCE, AND MOBILITY: Primary | ICD-10-CM

## 2022-06-29 DIAGNOSIS — R53.1 DECREASED STRENGTH, ENDURANCE, AND MOBILITY: Primary | ICD-10-CM

## 2022-06-29 DIAGNOSIS — Z74.09 DECREASED STRENGTH, ENDURANCE, AND MOBILITY: Primary | ICD-10-CM

## 2022-06-29 PROCEDURE — 97530 THERAPEUTIC ACTIVITIES: CPT | Mod: PO | Performed by: PHYSICAL THERAPIST

## 2022-06-29 PROCEDURE — 97110 THERAPEUTIC EXERCISES: CPT | Mod: PO | Performed by: PHYSICAL THERAPIST

## 2022-06-29 NOTE — PROGRESS NOTES
"  Physical Therapy Daily Treatment Note     Name: Melissa Casas  Clinic Number: [id number]    Therapy Diagnosis:   Encounter Diagnosis   Name Primary?    Decreased strength, endurance, and mobility Yes     Physician: Mary Anne Bowie FNP    Visit Date: 6/29/2022  Physician Orders: PT Eval and Treat   Post Surgical? No Eval and Treat Yes Comment - B KNEE Type of Therapy Outpatient Therapy   Medical Diagnosis from Referral: M17.11 (ICD-10-CM) - Primary osteoarthritis of right knee M17.12 (ICD-10-CM) - Primary osteoarthritis of left knee   Evaluation Date: 3/3/2022  Authorization Period Expiration: 02/24/2023, 12/31/2022  Plan of Care Expiration: 4/29/2022, 5/27/2022, 7/22/2022  Reassessment: 3/29/2022, 4/28/2022, 6/23/2022  Visit # / Visits authorized: 1/ 1, 9/20, 1/1, 1/20    Time In: 3:05  Time Out: 4:00  Total Billable Time: 45 minutes    Precautions: Standard, CAD, HTN, OP, OA, Asthma    Subjective     Pt reports: some pain of Left hip today.     She was compliant with home exercise program.  Response to previous treatment: no adverse effect  Functional change: too early    Pain: 4/10 pre tx  Location: bilateral hips     Objective     Hip AROM: Left/right  Hip flexion: 113/115  Hip Abduction: 35/40  Hip adduction: WNL    Melissa received therapeutic exercises to develop strength, endurance and core stabilization for 50 minutes including:     Recumbent bike 2, 15 mins (subjective taken)    Bridging 3x10  Side lying hip Abduction x10 each  Seated march 2x10  Seated LONG ARC QUAD x2 mins  Standing march 2x10 at head of table  Standing mini squats 2x10    Therapeutic activities x10 mins:  Step ups x20 6"  Lat step up x20 6"    Home Exercises Provided and Patient Education Provided     Education provided:   - Cont HEP, progress HEP  - POC    Written Home Exercises Provided: Patient instructed to cont prior HEP. Green and blue band given.  Exercises were reviewed and Melissa was able to demonstrate them prior to the end of the " "session.  Melissa demonstrated good  understanding of the education provided.     See EMR under Patient Instructions for exercises provided 3/3/2022.    Assessment     Melissa was able to demo endurance at least where she left off with therapy at 15 mins on the stationary bike today. She continues to need assistance with getting her Left foot on the pedal. Pt tolerated session without report of inc pain. She does rely heavily on hand use with stair negotiation, but was able to do 2 flights yesterday.  Melissa Is progressing well towards her goals.   Pt prognosis is Good.     Pt will continue to benefit from skilled outpatient physical therapy to address the deficits listed in the problem list box on initial evaluation, provide pt/family education and to maximize pt's level of independence in the home and community environment.     Pt's spiritual, cultural and educational needs considered and pt agreeable to plan of care and goals.    Anticipated barriers to physical therapy: comorbidities, obese, R TKA, L partial knee    Goals: progressing  Short Term Goals: 4 weeks   -Pt to demo L LE strength grossly 4-/5 or better.-MET  -Pt able to ascend 1-6" step with 3/10 pain or less and enough strength using 1 hand rail.-progressing, not met.  -FOTO impairment KNEE score to 68% or better for improving QOL.-MET, 55%     Long Term Goals: 8 weeks   -Pt to demo B LE strength grossly 4/5 or better.-progressing, not met.   6/23/2022:  Right LE   Left LE     Knee extension: 4/5 Knee extension: 4/5   Knee flexion: 4/5 Knee flexion: 4/5   Hip flexion: 4/5 Hip flexion: 3+/5   Hip abduction: 3/5 Hip abduction: 4/5   Hip adduction: 4/5 Hip adduction 4-/5   Ankle dorsiflexion: 4/5 Ankle dorsiflexion: 4/5   Ankle plantarflexion: 4/5 Ankle plantarflexion: 4/5     -Pt able to ascend/descend 6-6" steps with 3/10 pain or less and enough strength using 1-2 hand rail(s).not met progressing.  -FOTO impairment KNEE score to 53% or better for improving " QOL. Not met progressing.    Pt will be able to walk between rooms with little or no difficulty.      Plan     Plan of care Certification: 3/3/2022 to 4/29/2022.  Updated POC#1: 4/28/2022 to 5/27/2022.  Updated POC #2: 6/23/2022 to 7/22/2022.     Outpatient Physical Therapy 1-2 times weekly for 4 additional weeks to include the following interventions: Aquatic Therapy, Electrical Stimulation IFC, Gait Training, Manual Therapy, Moist Heat/ Ice, Neuromuscular Re-ed, Patient Education, Self Care, Therapeutic Activities and Therapeutic Exercise.    Lucero Bar, PT

## 2022-07-07 ENCOUNTER — CLINICAL SUPPORT (OUTPATIENT)
Dept: REHABILITATION | Facility: HOSPITAL | Age: 67
End: 2022-07-07
Attending: ORTHOPAEDIC SURGERY
Payer: MEDICARE

## 2022-07-07 DIAGNOSIS — R53.1 DECREASED STRENGTH, ENDURANCE, AND MOBILITY: Primary | ICD-10-CM

## 2022-07-07 DIAGNOSIS — R68.89 DECREASED STRENGTH, ENDURANCE, AND MOBILITY: Primary | ICD-10-CM

## 2022-07-07 DIAGNOSIS — Z74.09 DECREASED STRENGTH, ENDURANCE, AND MOBILITY: Primary | ICD-10-CM

## 2022-07-07 PROCEDURE — 97530 THERAPEUTIC ACTIVITIES: CPT | Mod: PO | Performed by: PHYSICAL THERAPIST

## 2022-07-07 PROCEDURE — 97110 THERAPEUTIC EXERCISES: CPT | Mod: PO | Performed by: PHYSICAL THERAPIST

## 2022-07-07 NOTE — PROGRESS NOTES
"  Physical Therapy Daily Treatment Note     Name: Melissa Casas  Clinic Number: [id number]    Therapy Diagnosis:   Encounter Diagnosis   Name Primary?    Decreased strength, endurance, and mobility Yes     Physician: Mary Anne Bowie FNP    Visit Date: 7/7/2022  Physician Orders: PT Eval and Treat   Post Surgical? No Eval and Treat Yes Comment - B KNEE Type of Therapy Outpatient Therapy   Medical Diagnosis from Referral: M17.11 (ICD-10-CM) - Primary osteoarthritis of right knee M17.12 (ICD-10-CM) - Primary osteoarthritis of left knee   Evaluation Date: 3/3/2022  Authorization Period Expiration: 02/24/2023, 12/31/2022  Plan of Care Expiration: 4/29/2022, 5/27/2022, 7/22/2022  Reassessment: 3/29/2022, 4/28/2022, 6/23/2022  Visit # / Visits authorized: 1/ 1, 9/20, 1/1, 2/20    Time In: 3:15  Time Out: 4:00  Total Billable Time: 45 minutes    Precautions: Standard, CAD, HTN, OP, OA, Asthma    Subjective     Pt reports: she is still working with pulmonology.     She was compliant with home exercise program.  Response to previous treatment: no adverse effect  Functional change: too early    Pain: 4/10 pre tx  Location: bilateral hips     Objective     Melissa received therapeutic exercises to develop strength, endurance and core stabilization for 40 minutes including:     Recumbent bike 2, 15 mins (subjective taken)    Bridging 3x10  Side lying hip Abduction x10 each  Seated LONG ARC QUAD x2 mins  Standing march 2x10 at head of table  Standing mini squats 2x10    Therapeutic activities x10 mins:  Step ups x20 6"  Lat step up x20 6"  Unable to perform heel tap.    Home Exercises Provided and Patient Education Provided     Education provided:   - Cont HEP, progress HEP  - POC    Written Home Exercises Provided: Patient instructed to cont prior HEP. Green and blue band given.  Exercises were reviewed and Melissa was able to demonstrate them prior to the end of the session.  Melissa demonstrated good  understanding of the education " "provided.     See EMR under Patient Instructions for exercises provided 3/3/2022.    Assessment     Melissa was continues with trouble on stairs, but is improving. She has a procedure next week and may need to cancel therapy if rest is recommended. Will cont plan to (I) HEP.    Melissa Is progressing well towards her goals.   Pt prognosis is Good.     Pt will continue to benefit from skilled outpatient physical therapy to address the deficits listed in the problem list box on initial evaluation, provide pt/family education and to maximize pt's level of independence in the home and community environment.     Pt's spiritual, cultural and educational needs considered and pt agreeable to plan of care and goals.    Anticipated barriers to physical therapy: comorbidities, obese, R TKA, L partial knee    Goals: progressing  Short Term Goals: 4 weeks   -Pt to demo L LE strength grossly 4-/5 or better.-MET  -Pt able to ascend 1-6" step with 3/10 pain or less and enough strength using 1 hand rail.-progressing, not met.  -FOTO impairment KNEE score to 68% or better for improving QOL.-MET, 55%     Long Term Goals: 8 weeks   -Pt to demo B LE strength grossly 4/5 or better.-progressing, not met.   6/23/2022:  Right LE   Left LE     Knee extension: 4/5 Knee extension: 4/5   Knee flexion: 4/5 Knee flexion: 4/5   Hip flexion: 4/5 Hip flexion: 3+/5   Hip abduction: 3/5 Hip abduction: 4/5   Hip adduction: 4/5 Hip adduction 4-/5   Ankle dorsiflexion: 4/5 Ankle dorsiflexion: 4/5   Ankle plantarflexion: 4/5 Ankle plantarflexion: 4/5     -Pt able to ascend/descend 6-6" steps with 3/10 pain or less and enough strength using 1-2 hand rail(s).not met progressing.  -FOTO impairment KNEE score to 53% or better for improving QOL. Not met progressing.    Pt will be able to walk between rooms with little or no difficulty.      Plan     Plan of care Certification: 3/3/2022 to 4/29/2022.  Updated POC#1: 4/28/2022 to 5/27/2022.  Updated POC #2: " 6/23/2022 to 7/22/2022.     Outpatient Physical Therapy 1-2 times weekly for 4 additional weeks to include the following interventions: Aquatic Therapy, Electrical Stimulation IFC, Gait Training, Manual Therapy, Moist Heat/ Ice, Neuromuscular Re-ed, Patient Education, Self Care, Therapeutic Activities and Therapeutic Exercise.    Lucero Bar, PT

## 2022-07-18 ENCOUNTER — CLINICAL SUPPORT (OUTPATIENT)
Dept: REHABILITATION | Facility: HOSPITAL | Age: 67
End: 2022-07-18
Attending: ORTHOPAEDIC SURGERY
Payer: MEDICARE

## 2022-07-18 ENCOUNTER — TELEPHONE (OUTPATIENT)
Dept: CARDIOLOGY | Facility: CLINIC | Age: 67
End: 2022-07-18
Payer: MEDICARE

## 2022-07-18 DIAGNOSIS — Z74.09 DECREASED STRENGTH, ENDURANCE, AND MOBILITY: Primary | ICD-10-CM

## 2022-07-18 DIAGNOSIS — R68.89 DECREASED STRENGTH, ENDURANCE, AND MOBILITY: Primary | ICD-10-CM

## 2022-07-18 DIAGNOSIS — R53.1 DECREASED STRENGTH, ENDURANCE, AND MOBILITY: Primary | ICD-10-CM

## 2022-07-18 PROCEDURE — 97110 THERAPEUTIC EXERCISES: CPT | Mod: PO,CQ

## 2022-07-18 NOTE — PROGRESS NOTES
"  Physical Therapy Daily Treatment Note     Name: Melissa Casas  Clinic Number: [id number]    Therapy Diagnosis:   Encounter Diagnosis   Name Primary?    Decreased strength, endurance, and mobility Yes     Physician: Mary Anne Bowie FNP    Visit Date: 7/18/2022  Physician Orders: PT Eval and Treat   Post Surgical? No Eval and Treat Yes Comment - B KNEE Type of Therapy Outpatient Therapy   Medical Diagnosis from Referral: M17.11 (ICD-10-CM) - Primary osteoarthritis of right knee M17.12 (ICD-10-CM) - Primary osteoarthritis of left knee   Evaluation Date: 3/3/2022  Authorization Period Expiration: 02/24/2023, 12/31/2022  Plan of Care Expiration: 4/29/2022, 5/27/2022, 7/22/2022  Reassessment: 3/29/2022, 4/28/2022, 6/23/2022  Visit # / Visits authorized: 1/ 1, 9/20, 1/1, 3/20    Time In: 3:15  Time Out: 4:06  Total Billable Time: 40 minutes    Precautions: Standard, CAD, HTN, OP, OA, Asthma    Subjective     Pt reports: she is had an angiogram and she is worried about. She has an appointment in one month with a  Cardiologist, but she is trying to move it up. Minimal knee pain today in B knees.    She was compliant with home exercise program.  Response to previous treatment: no adverse effect  Functional change: too early    Pain: 3/10 pre tx  Location: bilateral hips     Objective     Melissa received therapeutic exercises to develop strength, endurance and core stabilization for 36 minutes including:     Recumbent bike 2, 15 mins (subjective taken )    Bridging 3x10 2"  Side lying hip Abduction x15 each  Seated LONG ARC QUAD x2 mins  Standing march 2x10 at head of table  Standing mini squats 2x10    Therapeutic activities x10 mins:  Step ups x20 6"  Lat step up x20 6"  Unable to perform heel tap.    Home Exercises Provided and Patient Education Provided     Education provided:   - Cont HEP, progress HEP  - POC    Written Home Exercises Provided: Patient instructed to cont prior HEP. Green and blue band given.  Exercises " "were reviewed and Melissa was able to demonstrate them prior to the end of the session.  Melissa demonstrated good  understanding of the education provided.     See EMR under Patient Instructions for exercises provided 3/3/2022.    Assessment     Pt tolerated therex well with no fatigue or c/o pain. Melissa able to do step ups, but needed to use moderate assistance of plinth on stairs. She uses the cane and the wall at home with her  watching her. Will cont plan to (I) HEP.    Melissa Is progressing well towards her goals.   Pt prognosis is Good.     Pt will continue to benefit from skilled outpatient physical therapy to address the deficits listed in the problem list box on initial evaluation, provide pt/family education and to maximize pt's level of independence in the home and community environment.     Pt's spiritual, cultural and educational needs considered and pt agreeable to plan of care and goals.    Anticipated barriers to physical therapy: comorbidities, obese, R TKA, L partial knee    Goals: progressing  Short Term Goals: 4 weeks   -Pt to demo L LE strength grossly 4-/5 or better.-MET  -Pt able to ascend 1-6" step with 3/10 pain or less and enough strength using 1 hand rail.-progressing, not met.  -FOTO impairment KNEE score to 68% or better for improving QOL.-MET, 55%     Long Term Goals: 8 weeks   -Pt to demo B LE strength grossly 4/5 or better.-progressing, not met.   6/23/2022:  Right LE   Left LE     Knee extension: 4/5 Knee extension: 4/5   Knee flexion: 4/5 Knee flexion: 4/5   Hip flexion: 4/5 Hip flexion: 3+/5   Hip abduction: 3/5 Hip abduction: 4/5   Hip adduction: 4/5 Hip adduction 4-/5   Ankle dorsiflexion: 4/5 Ankle dorsiflexion: 4/5   Ankle plantarflexion: 4/5 Ankle plantarflexion: 4/5     -Pt able to ascend/descend 6-6" steps with 3/10 pain or less and enough strength using 1-2 hand rail(s).not met progressing.  -FOTO impairment KNEE score to 53% or better for improving QOL. Not met " progressing.    Pt will be able to walk between rooms with little or no difficulty.      Plan     Plan of care Certification: 3/3/2022 to 4/29/2022.  Updated POC#1: 4/28/2022 to 5/27/2022.  Updated POC #2: 6/23/2022 to 7/22/2022.     Outpatient Physical Therapy 1-2 times weekly for 4 additional weeks to include the following interventions: Aquatic Therapy, Electrical Stimulation IFC, Gait Training, Manual Therapy, Moist Heat/ Ice, Neuromuscular Re-ed, Patient Education, Self Care, Therapeutic Activities and Therapeutic Exercise.    Laura Clements, PTA

## 2022-07-19 ENCOUNTER — PATIENT MESSAGE (OUTPATIENT)
Dept: CARDIOLOGY | Facility: CLINIC | Age: 67
End: 2022-07-19
Payer: MEDICARE

## 2022-07-19 NOTE — TELEPHONE ENCOUNTER
----- Message from Sorin Jaramillo, Patient Care Assistant sent at 7/19/2022  3:01 PM CDT -----  Contact: Pt  Type: Return Call    Who Called: Pt  Who Left Message for Pt: [place]  Does the patient know what this is regarding: Yes/Sooner Appt  Best Call Back Number: [phone]  Thank you~

## 2022-07-21 ENCOUNTER — CLINICAL SUPPORT (OUTPATIENT)
Dept: REHABILITATION | Facility: HOSPITAL | Age: 67
End: 2022-07-21
Attending: ORTHOPAEDIC SURGERY
Payer: MEDICARE

## 2022-07-21 DIAGNOSIS — R68.89 DECREASED STRENGTH, ENDURANCE, AND MOBILITY: Primary | ICD-10-CM

## 2022-07-21 DIAGNOSIS — R53.1 DECREASED STRENGTH, ENDURANCE, AND MOBILITY: Primary | ICD-10-CM

## 2022-07-21 DIAGNOSIS — Z74.09 DECREASED STRENGTH, ENDURANCE, AND MOBILITY: Primary | ICD-10-CM

## 2022-07-21 PROCEDURE — 97110 THERAPEUTIC EXERCISES: CPT | Mod: PO | Performed by: PHYSICAL THERAPIST

## 2022-07-21 NOTE — PROGRESS NOTES
[facility]  PT Discharge Note    Name: Melissa Casas  Clinic Number: [id number]    Therapy Diagnosis:   Encounter Diagnosis   Name Primary?    Decreased strength, endurance, and mobility Yes     Physician: Mary Anne Bowie FNP    Physician Orders: PT Eval and Treat   Post Surgical? No Eval and Treat Yes Comment - B KNEE Type of Therapy Outpatient Therapy   Medical Diagnosis from Referral: M17.11 (ICD-10-CM) - Primary osteoarthritis of right knee M17.12 (ICD-10-CM) - Primary osteoarthritis of left knee   Evaluation Date: 3/3/2022    Date of Last visit: 7/21/2022  Total Visits Received: 15    ASSESSMENT      See below    Discharge reason: Patient has completed the physician's prescription and Patient has reached the maximum rehab potential for the present time in that she can continue home exercises, but is limited by her cardiopulmonary status and is following with drs.    Discharge FOTO Score: 65%    Goals: see below    PLAN   This patient is discharged from Physical Therapy.    Lucero Bar, PT     Physical Therapy Daily Treatment Note     Name: Melissa Casas  Clinic Number: [id number]    Therapy Diagnosis:   Encounter Diagnosis   Name Primary?    Decreased strength, endurance, and mobility Yes     Physician: Mary Anne Bowie FNP    Visit Date: 7/21/2022  Physician Orders: PT Eval and Treat   Post Surgical? No Eval and Treat Yes Comment - B KNEE Type of Therapy Outpatient Therapy   Medical Diagnosis from Referral: M17.11 (ICD-10-CM) - Primary osteoarthritis of right knee M17.12 (ICD-10-CM) - Primary osteoarthritis of left knee   Evaluation Date: 3/3/2022  Authorization Period Expiration: 02/24/2023, 12/31/2022  Plan of Care Expiration: 4/29/2022, 5/27/2022, 7/22/2022  Reassessment: 3/29/2022, 4/28/2022, 6/23/2022  Visit # / Visits authorized: 1/ 1, 9/20, 1/1, 4/20    Time In: 1455  Time Out:   Total Billable Time: 40 minutes    Precautions: Standard, CAD, HTN, OP, OA, Asthma    Subjective  "    Pt reports: that she goes to her home being built with 6" steps a few times a week. She is able to do this even though there are no railings. Her stair navigation is not perfect, but it is improved. She is also trying to cut calories. Her endurance is improved when riding the   She was compliant with home exercise program.  Response to previous treatment: no adverse effect  Functional change: too early    Pain: 3/10 pre tx  Location: bilateral hips     Objective     Melissa received therapeutic exercises to develop strength, endurance and core stabilization for 30 minutes including:     Recumbent bike 2, 15 mins (subjective taken)    Seated LONG ARC QUAD x2 mins  Standing march 2x10 at head of table  Standing mini squats 2x10    Therapeutic activities x08 mins:  Step ups x10, x2, 6"  Lat step up x10 6"    Home Exercises Provided and Patient Education Provided     Education provided:   - discharge plan.    Written Home Exercises Provided: Patient instructed to cont prior HEP.  Exercises were reviewed and Melissa was able to demonstrate them prior to the end of the session.  Melissa demonstrated good  understanding of the education provided.     See EMR under Patient Instructions for exercises provided 3/3/2022, 5/3/2022.    Assessment     Pt is hesitant today due to test results related to her heart and lungs. She feels like she is still lacking direct answers. PT encouraged exercise as prescribed by therapy as appropriate but also refers back to cardiology and pulmonology for consultation in regards to ex and safety. Pt has home program handouts provided prior. Pt has met 4/7 goals.    Goals:   Short Term Goals: 4 weeks   -Pt to demo L LE strength grossly 4-/5 or better.-MET  -Pt able to ascend 1-6" step with 3/10 pain or less and enough strength using 1 hand rail.-MET, 7/21/2022. 2/10.  -FOTO impairment KNEE score to 68% or better for improving QOL.-MET, 55%     Long Term Goals: 8 weeks   -Pt to demo B LE strength " "grossly 4/5 or better.-MET, 7/21/2022 6/23/2022:  Right LE   Left LE     Knee extension: 4/5 Knee extension: 4/5   Knee flexion: 4/5 Knee flexion: 4/5   Hip flexion: 4/5 Hip flexion: 4/5   Hip abduction: 4/5 Hip abduction: 4/5   Hip adduction: 4/5 Hip adduction 4/5   Ankle dorsiflexion: 4/5 Ankle dorsiflexion: 4/5   Ankle plantarflexion: 5/5 Ankle plantarflexion: 5/5     -Pt able to ascend/descend 6-6" steps with 3/10 pain or less and enough strength using 1-2 hand rail(s).not met   -FOTO impairment KNEE score to 53% or better for improving QOL. -not met    -Pt will be able to walk between rooms with little or no difficulty.-not met, moderate difficulty 7/21/2022.    Plan     Discharge to [facility].    Lucero Bar, PT        "

## 2022-07-21 NOTE — PLAN OF CARE
[facility]  PT Discharge Note     Name: Melissa Casas  Clinic Number: [id number]     Therapy Diagnosis:        Encounter Diagnosis   Name Primary?    Decreased strength, endurance, and mobility Yes      Physician: Mary Anne Bowie FNP     Physician Orders: PT Eval and Treat   Post Surgical? No Eval and Treat Yes Comment - B KNEE Type of Therapy Outpatient Therapy   Medical Diagnosis from Referral: M17.11 (ICD-10-CM) - Primary osteoarthritis of right knee M17.12 (ICD-10-CM) - Primary osteoarthritis of left knee   Evaluation Date: 3/3/2022     Date of Last visit: 7/21/2022  Total Visits Received: 15     ASSESSMENT       See below     Discharge reason: Patient has completed the physician's prescription and Patient has reached the maximum rehab potential for the present time in that she can continue home exercises, but is limited by her cardiopulmonary status and is following with drs.     Discharge FOTO Score: 65%     Goals: see below     PLAN   This patient is discharged from Physical Therapy.     Lucero Bar, PT      Physical Therapy Daily Treatment Note      Name: Melissa Casas  Clinic Number: [id number]     Therapy Diagnosis:        Encounter Diagnosis   Name Primary?    Decreased strength, endurance, and mobility Yes      Physician: Mary Anne Bowie FNP     Visit Date: 7/21/2022  Physician Orders: PT Eval and Treat   Post Surgical? No Eval and Treat Yes Comment - B KNEE Type of Therapy Outpatient Therapy   Medical Diagnosis from Referral: M17.11 (ICD-10-CM) - Primary osteoarthritis of right knee M17.12 (ICD-10-CM) - Primary osteoarthritis of left knee   Evaluation Date: 3/3/2022  Authorization Period Expiration: 02/24/2023, 12/31/2022  Plan of Care Expiration: 4/29/2022, 5/27/2022, 7/22/2022  Reassessment: 3/29/2022, 4/28/2022, 6/23/2022  Visit # / Visits authorized: 1/ 1, 9/20, 1/1, 4/20     Time In: 1455  Time Out:   Total Billable Time: 40 minutes     Precautions: Standard, CAD, HTN, OP,  "OA, Asthma     Subjective      Pt reports: that she goes to her home being built with 6" steps a few times a week. She is able to do this even though there are no railings. Her stair navigation is not perfect, but it is improved. She is also trying to cut calories. Her endurance is improved when riding the   She was compliant with home exercise program.  Response to previous treatment: no adverse effect  Functional change: too early     Pain: 3/10 pre tx  Location: bilateral hips      Objective      Melissa received therapeutic exercises to develop strength, endurance and core stabilization for 30 minutes including:     Recumbent bike 2, 15 mins (subjective taken)     Seated LONG ARC QUAD x2 mins  Standing march 2x10 at head of table  Standing mini squats 2x10     Therapeutic activities x08 mins:  Step ups x10, x2, 6"  Lat step up x10 6"     Home Exercises Provided and Patient Education Provided      Education provided:   - discharge plan.     Written Home Exercises Provided: Patient instructed to cont prior HEP.  Exercises were reviewed and Melissa was able to demonstrate them prior to the end of the session.  Melissa demonstrated good  understanding of the education provided.      See EMR under Patient Instructions for exercises provided 3/3/2022, 5/3/2022.     Assessment      Pt is hesitant today due to test results related to her heart and lungs. She feels like she is still lacking direct answers. PT encouraged exercise as prescribed by therapy as appropriate but also refers back to cardiology and pulmonology for consultation in regards to ex and safety. Pt has home program handouts provided prior. Pt has met 4/7 goals.     Goals:   Short Term Goals: 4 weeks   -Pt to demo L LE strength grossly 4-/5 or better.-MET  -Pt able to ascend 1-6" step with 3/10 pain or less and enough strength using 1 hand rail.-MET, 7/21/2022. 2/10.  -FOTO impairment KNEE score to 68% or better for improving QOL.-MET, 55%     Long Term " "Goals: 8 weeks   -Pt to demo B LE strength grossly 4/5 or better.-MET, 7/21/2022 6/23/2022:  Right LE   Left LE     Knee extension: 4/5 Knee extension: 4/5   Knee flexion: 4/5 Knee flexion: 4/5   Hip flexion: 4/5 Hip flexion: 4/5   Hip abduction: 4/5 Hip abduction: 4/5   Hip adduction: 4/5 Hip adduction 4/5   Ankle dorsiflexion: 4/5 Ankle dorsiflexion: 4/5   Ankle plantarflexion: 5/5 Ankle plantarflexion: 5/5      -Pt able to ascend/descend 6-6" steps with 3/10 pain or less and enough strength using 1-2 hand rail(s).not met   -FOTO impairment KNEE score to 53% or better for improving QOL. -not met     -Pt will be able to walk between rooms with little or no difficulty.-not met, moderate difficulty 7/21/2022.     Plan      Discharge to [facility].     Lucero Bar, PT               "

## 2022-07-26 ENCOUNTER — PATIENT MESSAGE (OUTPATIENT)
Dept: REHABILITATION | Facility: HOSPITAL | Age: 67
End: 2022-07-26
Payer: MEDICARE

## 2022-07-28 ENCOUNTER — OFFICE VISIT (OUTPATIENT)
Dept: CARDIOLOGY | Facility: CLINIC | Age: 67
End: 2022-07-28
Payer: MEDICARE

## 2022-07-28 VITALS
BODY MASS INDEX: 43.93 KG/M2 | HEIGHT: 65 IN | DIASTOLIC BLOOD PRESSURE: 79 MMHG | WEIGHT: 263.69 LBS | HEART RATE: 79 BPM | SYSTOLIC BLOOD PRESSURE: 138 MMHG

## 2022-07-28 DIAGNOSIS — I25.10 CORONARY ARTERY DISEASE INVOLVING NATIVE CORONARY ARTERY OF NATIVE HEART WITHOUT ANGINA PECTORIS: ICD-10-CM

## 2022-07-28 DIAGNOSIS — E78.5 HYPERLIPIDEMIA, UNSPECIFIED HYPERLIPIDEMIA TYPE: ICD-10-CM

## 2022-07-28 DIAGNOSIS — R94.39 ABNORMAL NUCLEAR STRESS TEST: Primary | ICD-10-CM

## 2022-07-28 DIAGNOSIS — I10 PRIMARY HYPERTENSION: ICD-10-CM

## 2022-07-28 DIAGNOSIS — E66.01 MORBID OBESITY: ICD-10-CM

## 2022-07-28 PROCEDURE — 99999 PR PBB SHADOW E&M-EST. PATIENT-LVL IV: ICD-10-PCS | Mod: PBBFAC,,, | Performed by: INTERNAL MEDICINE

## 2022-07-28 PROCEDURE — 99999 PR PBB SHADOW E&M-EST. PATIENT-LVL IV: CPT | Mod: PBBFAC,,, | Performed by: INTERNAL MEDICINE

## 2022-07-28 PROCEDURE — 99214 OFFICE O/P EST MOD 30 MIN: CPT | Mod: S$PBB,,, | Performed by: INTERNAL MEDICINE

## 2022-07-28 PROCEDURE — 99214 OFFICE O/P EST MOD 30 MIN: CPT | Mod: PBBFAC,PO | Performed by: INTERNAL MEDICINE

## 2022-07-28 PROCEDURE — 99214 PR OFFICE/OUTPT VISIT, EST, LEVL IV, 30-39 MIN: ICD-10-PCS | Mod: S$PBB,,, | Performed by: INTERNAL MEDICINE

## 2022-07-28 RX ORDER — ISOSORBIDE MONONITRATE 60 MG/1
60 TABLET, EXTENDED RELEASE ORAL DAILY
Qty: 90 TABLET | Refills: 3 | Status: SHIPPED | OUTPATIENT
Start: 2022-07-28 | End: 2022-11-22 | Stop reason: CLARIF

## 2022-07-28 NOTE — PROGRESS NOTES
Subjective:    Patient ID:  Melissa Casas is a 66 y.o. female who presents for evaluation of Hospital Follow Up and Shortness of Breath      HPI 67 yo WF with long hx of CAD. Had recent cath with occluded RCA with graft to RCA occluded. Has good collaterals from the left as well as some R to R  Collaterals. Still has SOB.     Summary       · The left ventricular end diastolic pressure was elevated.  · The ejection fraction was greater than 55% by visual estimate.  · The pre-procedure left ventricular end diastolic pressure was 30.  · Mild disease in the LADF/Diagonal  · Diffuse disease in proximal RCA then occluded stents inn mid RCA.  · Occluded SVG to RCA,  · R to R and L to R collaterals filling distal RCA/PDA.  · Recommend medical treatment.     The procedure log was documented by No documenter listed and verified by Sophie Martinez MD.     Date: 7/11/2022  Time: 7:45 PM         Review of Systems   Constitutional: Negative for decreased appetite, fever, malaise/fatigue, weight gain and weight loss.   HENT: Negative for hearing loss and nosebleeds.    Eyes: Negative for visual disturbance.   Cardiovascular: Positive for dyspnea on exertion. Negative for chest pain, claudication, cyanosis, irregular heartbeat, leg swelling, near-syncope, orthopnea, palpitations, paroxysmal nocturnal dyspnea and syncope.   Respiratory: Positive for shortness of breath. Negative for cough, hemoptysis, sleep disturbances due to breathing, snoring and wheezing.    Endocrine: Negative for cold intolerance, heat intolerance, polydipsia and polyuria.   Hematologic/Lymphatic: Negative for adenopathy and bleeding problem. Does not bruise/bleed easily.   Skin: Negative for color change, itching, poor wound healing, rash and suspicious lesions.   Musculoskeletal: Positive for arthritis and joint pain. Negative for back pain, falls, joint swelling, muscle cramps, muscle weakness and myalgias.   Gastrointestinal: Negative for bloating,  "abdominal pain, change in bowel habit, constipation, flatus, heartburn, hematemesis, hematochezia, hemorrhoids, jaundice, melena, nausea and vomiting.   Genitourinary: Negative for bladder incontinence, decreased libido, frequency, hematuria, hesitancy and urgency.   Neurological: Negative for brief paralysis, difficulty with concentration, excessive daytime sleepiness, dizziness, focal weakness, headaches, light-headedness, loss of balance, numbness, vertigo and weakness.   Psychiatric/Behavioral: Negative for altered mental status, depression and memory loss. The patient does not have insomnia and is not nervous/anxious.    Allergic/Immunologic: Negative for environmental allergies, hives and persistent infections.        Objective:    Physical Exam  Vitals and nursing note reviewed.   Constitutional:       Appearance: She is well-developed.      Comments: /79 (BP Location: Left arm, Patient Position: Sitting, BP Method: Large (Automatic))   Pulse 79   Ht 5' 5" (1.651 m)   Wt 119.6 kg (263 lb 10.7 oz)   BMI 43.88 kg/m²      HENT:      Head: Normocephalic and atraumatic.      Right Ear: External ear normal.      Left Ear: External ear normal.      Nose: Nose normal.   Eyes:      General: Lids are normal. No scleral icterus.        Right eye: No discharge.         Left eye: No discharge.      Conjunctiva/sclera: Conjunctivae normal.      Right eye: No hemorrhage.     Pupils: Pupils are equal, round, and reactive to light.   Neck:      Thyroid: No thyromegaly.      Vascular: No JVD.      Trachea: No tracheal deviation.   Cardiovascular:      Rate and Rhythm: Normal rate and regular rhythm.      Pulses: Intact distal pulses.      Heart sounds: Normal heart sounds. No murmur heard.    No friction rub. No gallop.   Pulmonary:      Effort: Pulmonary effort is normal. No respiratory distress.      Breath sounds: Normal breath sounds. No wheezing or rales.   Chest:      Chest wall: No tenderness.   Breasts: " Breasts are symmetrical.       Abdominal:      General: Bowel sounds are normal. There is no distension.      Palpations: Abdomen is soft. There is no hepatomegaly or mass.      Tenderness: There is no abdominal tenderness. There is no guarding or rebound.   Musculoskeletal:         General: No tenderness. Normal range of motion.      Cervical back: Normal range of motion and neck supple.   Lymphadenopathy:      Cervical: No cervical adenopathy.   Skin:     General: Skin is warm and dry.      Coloration: Skin is not pale.      Findings: No erythema or rash.   Neurological:      Mental Status: She is alert and oriented to person, place, and time.      Cranial Nerves: No cranial nerve deficit.      Coordination: Coordination normal.      Deep Tendon Reflexes: Reflexes normal.   Psychiatric:         Behavior: Behavior normal.         Thought Content: Thought content normal.         Judgment: Judgment normal.           Assessment:       1. Abnormal nuclear stress test    2. Primary hypertension    3. Hyperlipidemia, unspecified hyperlipidemia type    4. Coronary artery disease involving native coronary artery of native heart without angina pectoris    5. Morbid obesity         Plan:     Tried to answer her questions and offered referral to CV surgery for second opinion     Patient advised to modify risk factors such as weight, exercise, diet,  tobacco and alcohol exposure     Increase imdur to 60 mg     No orders of the defined types were placed in this encounter.    Follow up in about 3 months (around 10/28/2022).

## 2022-08-22 PROBLEM — R06.09 DYSPNEA ON EXERTION: Status: ACTIVE | Noted: 2022-08-22

## 2022-08-22 PROBLEM — I35.0 AORTIC VALVE STENOSIS: Status: ACTIVE | Noted: 2022-08-22

## 2022-08-22 PROBLEM — I50.30 DIASTOLIC HEART FAILURE: Status: ACTIVE | Noted: 2022-08-22

## 2022-08-29 PROBLEM — E66.9 RESTRICTIVE LUNG DISEASE SECONDARY TO OBESITY: Status: ACTIVE | Noted: 2022-08-29

## 2022-08-29 PROBLEM — J98.4 RESTRICTIVE LUNG DISEASE SECONDARY TO OBESITY: Status: ACTIVE | Noted: 2022-08-29

## 2022-09-12 DIAGNOSIS — M17.12 PRIMARY OSTEOARTHRITIS OF LEFT KNEE: ICD-10-CM

## 2022-09-12 DIAGNOSIS — M17.11 PRIMARY OSTEOARTHRITIS OF RIGHT KNEE: Primary | ICD-10-CM

## 2022-09-13 ENCOUNTER — HOSPITAL ENCOUNTER (OUTPATIENT)
Dept: RADIOLOGY | Facility: HOSPITAL | Age: 67
Discharge: HOME | End: 2022-09-13
Attending: NURSE PRACTITIONER
Payer: MEDICARE

## 2022-09-13 ENCOUNTER — OFFICE VISIT (OUTPATIENT)
Dept: ORTHOPEDICS | Facility: CLINIC | Age: 67
End: 2022-09-13
Payer: MEDICARE

## 2022-09-13 VITALS — WEIGHT: 268 LBS | HEIGHT: 65 IN | BODY MASS INDEX: 44.65 KG/M2

## 2022-09-13 DIAGNOSIS — M70.62 TROCHANTERIC BURSITIS OF BOTH HIPS: ICD-10-CM

## 2022-09-13 DIAGNOSIS — M17.11 PRIMARY OSTEOARTHRITIS OF RIGHT KNEE: ICD-10-CM

## 2022-09-13 DIAGNOSIS — M70.51 PES ANSERINUS BURSITIS OF RIGHT KNEE: Primary | ICD-10-CM

## 2022-09-13 DIAGNOSIS — M70.61 TROCHANTERIC BURSITIS OF BOTH HIPS: ICD-10-CM

## 2022-09-13 DIAGNOSIS — M17.12 PRIMARY OSTEOARTHRITIS OF LEFT KNEE: ICD-10-CM

## 2022-09-13 PROCEDURE — 99214 PR OFFICE/OUTPT VISIT, EST, LEVL IV, 30-39 MIN: ICD-10-PCS | Mod: S$PBB,25,, | Performed by: NURSE PRACTITIONER

## 2022-09-13 PROCEDURE — 73562 X-RAY EXAM OF KNEE 3: CPT | Mod: TC,50,PO

## 2022-09-13 PROCEDURE — 99999 PR PBB SHADOW E&M-EST. PATIENT-LVL IV: ICD-10-PCS | Mod: PBBFAC,,, | Performed by: NURSE PRACTITIONER

## 2022-09-13 PROCEDURE — 20610 DRAIN/INJ JOINT/BURSA W/O US: CPT | Mod: 50,S$PBB,, | Performed by: NURSE PRACTITIONER

## 2022-09-13 PROCEDURE — 99214 OFFICE O/P EST MOD 30 MIN: CPT | Mod: PBBFAC,PN,25 | Performed by: NURSE PRACTITIONER

## 2022-09-13 PROCEDURE — 73562 XR KNEE ORTHO BILAT: ICD-10-PCS | Mod: 26,50,, | Performed by: RADIOLOGY

## 2022-09-13 PROCEDURE — 20610 DRAIN/INJ JOINT/BURSA W/O US: CPT | Mod: S$PBB,51,59,RT | Performed by: NURSE PRACTITIONER

## 2022-09-13 PROCEDURE — 99214 OFFICE O/P EST MOD 30 MIN: CPT | Mod: S$PBB,25,, | Performed by: NURSE PRACTITIONER

## 2022-09-13 PROCEDURE — 20610 DRAIN/INJ JOINT/BURSA W/O US: CPT | Mod: 50,PBBFAC,PN | Performed by: NURSE PRACTITIONER

## 2022-09-13 PROCEDURE — 20610 LARGE JOINT ASPIRATION/INJECTION: BILATERAL GREATER TROCHANTERIC BURSA: ICD-10-PCS | Mod: 50,S$PBB,, | Performed by: NURSE PRACTITIONER

## 2022-09-13 PROCEDURE — 99999 PR PBB SHADOW E&M-EST. PATIENT-LVL IV: CPT | Mod: PBBFAC,,, | Performed by: NURSE PRACTITIONER

## 2022-09-13 PROCEDURE — 73562 X-RAY EXAM OF KNEE 3: CPT | Mod: 26,50,, | Performed by: RADIOLOGY

## 2022-09-13 PROCEDURE — 20610 DRAIN/INJ JOINT/BURSA W/O US: CPT | Mod: PBBFAC,PN | Performed by: NURSE PRACTITIONER

## 2022-09-13 RX ADMIN — TRIAMCINOLONE ACETONIDE 40 MG: 40 INJECTION, SUSPENSION INTRA-ARTICULAR; INTRAMUSCULAR at 01:09

## 2022-09-13 NOTE — PROGRESS NOTES
Chief Complaint   Patient presents with    Left Hip - Pain    Right Hip - Pain    Right Knee - Pain    Left Knee - Pain      HPI:   This is a 66 y.o. who presents to clinic today for bilateral hip pain for the past 2 weeks after no known trauma. Complains of pain while sleeping on side and when descending stairs. Pain is deep. No numbness or tingling. No associated signs or symptoms.      Past Medical History:   Diagnosis Date    Angina pectoris     Anxiety     Aortic valve stenosis     Asthma     CAD (coronary artery disease)     stents; cabg    Chronic pain     Claustrophobia     Coronary artery disease     Diverticulitis     Hyperlipidemia     Osteopenia     Pneumonia     Primary hypertension     Primary osteoarthritis of left knee 06/24/2021    Restrictive lung disease secondary to obesity 8/29/2022    Thyroid disease      Past Surgical History:   Procedure Laterality Date    CORONARY ANGIOGRAPHY INCLUDING BYPASS GRAFTS WITH CATHETERIZATION OF LEFT HEART N/A 7/11/2022    Procedure: ANGIOGRAM, CORONARY, INCLUDING BYPASS GRAFT, WITH LEFT HEART CATHETERIZATION;  Surgeon: Sophie Martinez MD;  Location: [facility];  Service: Cardiology;  Laterality: N/A;    CORONARY ANGIOPLASTY WITH STENT PLACEMENT Right     CORONARY ARTERY BYPASS GRAFT      double     JOINT REPLACEMENT Right     total    JOINT REPLACEMENT Left     patital      Current Outpatient Medications on File Prior to Visit   Medication Sig Dispense Refill    ALPRAZolam (XANAX) 0.5 MG tablet Take 1 tablet (0.5 mg total) by mouth 2 (two) times daily as needed for Anxiety. 60 tablet 2    ascorbic acid, vitamin C, (VITAMIN C) 500 MG tablet Take 500 mg by mouth once daily.      aspirin 81 MG Chew Take 81 mg by mouth once daily.      atorvastatin (LIPITOR) 80 MG tablet Take 1 tablet (80 mg total) by mouth once daily. 90 tablet 3    calcium carbonate (OS-RAY) 600 mg calcium (1,500 mg) Tab Take 600 mg by mouth once.      COLLAGEN MISC Take 5,000 mg by mouth 2 (two)  times a day.      diclofenac sodium (VOLTAREN) 1 % Gel Apply 2 g topically 4 (four) times daily as needed.      dicyclomine (BENTYL) 10 MG capsule Take 1 capsule (10 mg total) by mouth 3 (three) times daily as needed (cramping). (Patient taking differently: Take 10-20 mg by mouth 3 (three) times daily as needed (cramping).) 30 capsule 3    diltiaZEM (CARDIZEM CD) 120 MG Cp24 Take 1 capsule (120 mg total) by mouth once daily. 90 capsule 3    estradioL (VAGIFEM) 10 mcg Tab Place 1 tablet (10 mcg total) vaginally twice a week. 24 tablet 3    fluticasone furoate-vilanteroL (BREO ELLIPTA) 100-25 mcg/dose diskus inhaler Inhale 1 puff into the lungs once daily. Controller 60 each 3    fluticasone propionate (FLONASE) 50 mcg/actuation nasal spray 1 spray (50 mcg total) by Each Nostril route 2 (two) times a day. 48 g 3    isosorbide mononitrate (IMDUR) 60 MG 24 hr tablet Take 1 tablet (60 mg total) by mouth once daily. 90 tablet 3    levalbuterol (XOPENEX HFA) 45 mcg/actuation inhaler Inhale 1-2 puffs into the lungs every 6 (six) hours as needed for Wheezing. Rescue 45 g 3    levocetirizine (XYZAL) 5 MG tablet Take 5 mg by mouth every evening.      levothyroxine (SYNTHROID) 150 MCG tablet Take 1 tablet (150 mcg total) by mouth before breakfast. 90 tablet 3    magnesium oxide 500 mg Tab Take 1 tablet by mouth once daily.       metoprolol succinate (TOPROL-XL) 25 MG 24 hr tablet Take 1 tablet (25 mg total) by mouth once daily. 90 tablet 3    montelukast (SINGULAIR) 10 mg tablet Take 1 tablet (10 mg total) by mouth every evening. 90 tablet 3    multivitamin (THERAGRAN) per tablet Take 1 tablet by mouth once daily.      nitroGLYCERIN 0.4 MG/DOSE TL SPRY (NITROLINGUAL) 400 mcg/spray spray Place 2 sprays under the tongue every 5 (five) minutes as needed for Chest pain. 0.004 g 12    raloxifene (EVISTA) 60 mg tablet Take 1 tablet (60 mg total) by mouth once daily. 90 tablet 3    ramipriL (ALTACE) 5 MG capsule Take 1 capsule (5 mg  total) by mouth once daily. 90 capsule 3    ranolazine (RANEXA) 500 MG Tb12 Take 2 tablets (1,000 mg total) by mouth 2 (two) times a day. 180 tablet 3    spironolactone (ALDACTONE) 25 MG tablet Take 1 tablet (25 mg total) by mouth once daily. 90 tablet 3    vitamin D (VITAMIN D3) 1000 units Tab Take 1,000 Units by mouth once daily.       No current facility-administered medications on file prior to visit.     Review of patient's allergies indicates:   Allergen Reactions    Compazine [prochlorperazine edisylate]      Family History   Problem Relation Age of Onset    Atrial fibrillation Mother     Thyroid disease Mother     Osteoporosis Mother     Anxiety disorder Mother     Heart disease Father     Anxiety disorder Father     Mental illness Maternal Uncle         schizophrenia     Anxiety disorder Maternal Grandmother     Ulcers Maternal Grandmother     Cancer Paternal Aunt     Diabetes Paternal Aunt      Social History     Socioeconomic History    Marital status:      Spouse name: Mynor   Tobacco Use    Smoking status: Never    Smokeless tobacco: Never   Substance and Sexual Activity    Alcohol use: Not Currently    Drug use: Never       Review of Systems:  Constitutional:  Denies fever or chills   Eyes:  Denies change in visual acuity   HENT:  Denies nasal congestion or sore throat   Respiratory:  Denies cough or shortness of breath   Cardiovascular:  Denies chest pain or edema   GI:  Denies abdominal pain, nausea, vomiting, bloody stools or diarrhea   :  Denies dysuria   Integument:  Denies rash   Neurologic:  Denies headache, focal weakness or sensory changes   Endocrine:  Denies polyuria or polydipsia   Lymphatic:  Denies swollen glands   Psychiatric:  Denies depression or anxiety     Physical Exam:   Constitutional:  Well developed, well nourished, no acute distress, non-toxic appearance   Integument:  Well hydrated, no rash   Lymphatic:  No lymphadenopathy noted   Neurologic:  Alert & oriented x  3  Psychiatric:  Speech and behavior appropriate     Bilateral Hip Exam    bilateral Hip Exam   bilateral hip exam performed same as contralateral hip and is normal.     bilateral Hip Exam   Tenderness   The patient is experiencing tenderness in the greater trochanter.  Range of Motion   The patient has normal hip ROM.  Muscle Strength   Abduction: 4/5   Other   Erythema: absent  Sensation: normal  Pulse: present    X-rays were personally reviewed by me and findings discussed with the patient.  2 views of the bilateral hip show no fractures or dislocations    Pes anserinus bursitis of right knee  -     Large Joint Aspiration/Injection: R anserine bursa    Trochanteric bursitis of both hips  -     Large Joint Aspiration/Injection: bilateral greater trochanteric bursa         Using an aseptic technique, I injected 5 cc of lidocaine 1% without and 1 cc of kenalog 40mg into the bilateral Hip. The patient tolerated this well.     Using an aseptic technique, I injected 1 cc of lidocaine 1% without and 1 cc of kenalog 40mg into the     Right knee pes bursa . The patient tolerated this well.     RTC as needed.

## 2022-09-14 RX ORDER — TRIAMCINOLONE ACETONIDE 40 MG/ML
40 INJECTION, SUSPENSION INTRA-ARTICULAR; INTRAMUSCULAR
Status: DISCONTINUED | OUTPATIENT
Start: 2022-09-13 | End: 2022-09-15 | Stop reason: HOSPADM

## 2022-09-15 NOTE — PROCEDURES
Large Joint Aspiration/Injection: bilateral greater trochanteric bursa    Date/Time: 9/13/2022 1:40 PM  Performed by: MUKESH Steven  Authorized by: MUKESH Steven     Consent Done?:  Yes (Verbal)  Indications:  Pain  Timeout: prior to procedure the correct patient, procedure, and site was verified    Prep: patient was prepped and draped in usual sterile fashion      Local anesthesia used?: Yes    Local anesthetic:  Lidocaine 1% without epinephrine  Anesthetic total (ml):  5      Details:  Needle Size:  21 G  Approach:  Lateral  Location:  Hip  Laterality:  Bilateral  Site:  Bilateral greater trochanteric bursa  Medications (Right):  40 mg triamcinolone acetonide 40 mg/mL  Medications (Left):  40 mg triamcinolone acetonide 40 mg/mL  Patient tolerance:  Patient tolerated the procedure well with no immediate complications  Large Joint Aspiration/Injection: R anserine bursa    Date/Time: 9/13/2022 1:40 PM  Performed by: MUKESH Steven  Authorized by: MUKESH Steven     Consent Done?:  Yes (Verbal)  Indications:  Pain  Timeout: prior to procedure the correct patient, procedure, and site was verified    Prep: patient was prepped and draped in usual sterile fashion    Local anesthetic:  Lidocaine 1% without epinephrine  Anesthetic total (ml):  1      Details:  Needle Size:  21 G  Approach:  Anterolateral  Location:  Knee  Site:  R anserine bursa  Medications:  40 mg triamcinolone acetonide 40 mg/mL  Patient tolerance:  Patient tolerated the procedure well with no immediate complications

## 2022-09-26 ENCOUNTER — OFFICE VISIT (OUTPATIENT)
Dept: URGENT CARE | Facility: CLINIC | Age: 67
End: 2022-09-26
Payer: MEDICARE

## 2022-09-26 VITALS
OXYGEN SATURATION: 97 % | TEMPERATURE: 98 F | SYSTOLIC BLOOD PRESSURE: 168 MMHG | RESPIRATION RATE: 16 BRPM | WEIGHT: 260 LBS | BODY MASS INDEX: 43.32 KG/M2 | HEART RATE: 79 BPM | DIASTOLIC BLOOD PRESSURE: 93 MMHG | HEIGHT: 65 IN

## 2022-09-26 DIAGNOSIS — U07.1 COVID-19 VIRUS DETECTED: ICD-10-CM

## 2022-09-26 DIAGNOSIS — J02.9 SORE THROAT: ICD-10-CM

## 2022-09-26 DIAGNOSIS — U07.1 COVID-19 VIRUS INFECTION: Primary | ICD-10-CM

## 2022-09-26 LAB
CTP QC/QA: YES
SARS-COV-2 RDRP RESP QL NAA+PROBE: POSITIVE

## 2022-09-26 PROCEDURE — 99203 PR OFFICE/OUTPT VISIT, NEW, LEVL III, 30-44 MIN: ICD-10-PCS | Mod: S$GLB,,, | Performed by: NURSE PRACTITIONER

## 2022-09-26 PROCEDURE — U0002 COVID-19 LAB TEST NON-CDC: HCPCS | Mod: QW,CR,S$GLB, | Performed by: NURSE PRACTITIONER

## 2022-09-26 PROCEDURE — U0002: ICD-10-PCS | Mod: QW,CR,S$GLB, | Performed by: NURSE PRACTITIONER

## 2022-09-26 PROCEDURE — 99203 OFFICE O/P NEW LOW 30 MIN: CPT | Mod: S$GLB,,, | Performed by: NURSE PRACTITIONER

## 2022-09-26 NOTE — PROGRESS NOTES
"Subjective:       Patient ID: Melissa Casas is a 66 y.o. female.    Vitals:  height is 5' 4.5" (1.638 m) and weight is 117.9 kg (260 lb). Her oral temperature is 98.3 °F (36.8 °C). Her blood pressure is 168/93 (abnormal) and her pulse is 79. Her respiration is 16 and oxygen saturation is 97%.     Chief Complaint: Sinus Problem    Pt presents today with cough, congestion, and sore throat. X's 3 days. Pt has taken acetaminophen with mild relief.    Provider note begins below:  Patient denies cp, sob, fever, chills, vomiting or abdominal pain. Patient awake and alert. Sitting in chair in NAD. Speaking in full sentences. Afebrile.    Sinus Problem  This is a new problem. Episode onset: 3 days. The problem is unchanged. There has been no fever. Her pain is at a severity of 6/10. Associated symptoms include chills, congestion, coughing, ear pain, sinus pressure, a sore throat and swollen glands. Pertinent negatives include no diaphoresis, headaches, hoarse voice, neck pain, shortness of breath or sneezing. Past treatments include acetaminophen. The treatment provided mild relief.     Constitution: Positive for chills. Negative for sweating and fatigue.   HENT:  Positive for ear pain, congestion, sinus pressure and sore throat. Negative for facial swelling.    Neck: Negative for neck pain and painful lymph nodes.   Cardiovascular: Negative.  Negative for chest trauma, chest pain and sob on exertion.   Eyes: Negative.  Negative for eye itching and eye pain.   Respiratory:  Positive for cough. Negative for chest tightness, shortness of breath and asthma.    Gastrointestinal: Negative.  Negative for nausea, vomiting and diarrhea.   Endocrine: negative. cold intolerance and excessive thirst.   Genitourinary: Negative.  Negative for dysuria, frequency, urgency and hematuria.   Musculoskeletal:  Negative for pain, trauma and joint pain.   Skin: Negative.  Negative for rash, wound and hives.   Allergic/Immunologic: Negative.  " Negative for eczema, asthma, hives, itching and sneezing.   Neurological: Negative.  Negative for headaches, disorientation and altered mental status.   Hematologic/Lymphatic: Negative.  Negative for swollen lymph nodes.   Psychiatric/Behavioral: Negative.  Negative for altered mental status, disorientation and confusion.      Objective:      Physical Exam   Constitutional: She is oriented to person, place, and time. She appears well-developed. She is cooperative.  Non-toxic appearance. She does not appear ill. No distress.   HENT:   Head: Normocephalic and atraumatic.   Ears:   Right Ear: Hearing, tympanic membrane, external ear and ear canal normal. impacted cerumen  Left Ear: Hearing, tympanic membrane, external ear and ear canal normal. impacted cerumen  Nose: Nose normal. No mucosal edema, rhinorrhea, nasal deformity or congestion. No epistaxis. Right sinus exhibits no maxillary sinus tenderness and no frontal sinus tenderness. Left sinus exhibits no maxillary sinus tenderness and no frontal sinus tenderness.   Mouth/Throat: Uvula is midline, oropharynx is clear and moist and mucous membranes are normal. No trismus in the jaw. Normal dentition. No uvula swelling. No oropharyngeal exudate, posterior oropharyngeal edema or posterior oropharyngeal erythema.   Eyes: Conjunctivae and lids are normal. No scleral icterus.   Neck: Trachea normal and phonation normal. Neck supple. No edema present. No erythema present. No neck rigidity present.   Cardiovascular: Normal rate, regular rhythm, normal heart sounds and normal pulses.   Pulmonary/Chest: Effort normal and breath sounds normal. No stridor. No respiratory distress. She has no decreased breath sounds. She has no wheezes. She has no rhonchi. She has no rales. She exhibits no tenderness.   Abdominal: Normal appearance. She exhibits no distension. Soft. flat abdomen There is no rebound, no guarding, no left CVA tenderness and no right CVA tenderness.    Musculoskeletal: Normal range of motion.         General: No deformity. Normal range of motion.   Lymphadenopathy:     She has no cervical adenopathy.   Neurological: no focal deficit. She is alert and oriented to person, place, and time. She exhibits normal muscle tone. Coordination normal.   Skin: Skin is warm, dry, intact, not diaphoretic and not pale.   Psychiatric: Her speech is normal and behavior is normal. Mood, judgment and thought content normal.   Nursing note and vitals reviewed.  The following results have been reviewed with the patient:  LABS-  Results for orders placed or performed in visit on 09/26/22   POCT COVID-19 Rapid Screening   Result Value Ref Range    POC Rapid COVID Positive (A) Negative     Acceptable Yes             Assessment:       1. COVID-19 virus infection    2. Sore throat          Plan:       FOLLOWUP  Follow up if symptoms worsen or fail to improve, for PLEASE CONTACT PCP OR CONTACT THE EMERGENCY ROOM..     PATIENT INSTRUCTIONS  Patient Instructions   INSTRUCTIONS:  - Rest.  - Drink plenty of fluids.  - Take Tylenol and/or Ibuprofen as directed as needed for fever/pain.  Do not take more than the recommended dose.  - follow up with your PCP within the next 1-2 weeks as needed.  - You must understand that you have received an Urgent Care treatment only and that you may be released before all of your medical problems are known or treated.   - You, the patient, will arrange for follow up care as instructed.   - If your condition worsens or fails to improve we recommend that you receive another evaluation at the ER immediately or contact your PCP to discuss your concerns.   - You can call [phone] or [phone] to help schedule an appointment with the appropriate provider.       OVER THE COUNTER RECOMMENDATIONS/SUGGESTIONS.     Make sure to stay well hydrated.     Use Nasal Saline to mechanically move any post nasal drip from your eustachian tube or from the  back of your throat.     Use warm salt water gargles to ease your throat pain. Warm salt water gargles as needed for sore throat-  1/2 tsp salt to 1 cup warm water, gargle as desired.     Use an antihistamine such as Claritin, Zyrtec or Allegra to dry you out.      Use pseudoephedrine (behind the counter) to decongest. Pseudoephedrine  30 mg up to 240 mg /day. It can raise your blood pressure and give you palpitations.     Use mucinex (guaifenisin) to break up mucous up to 2400mg/day to loosen any mucous.   The mucinex DM pill has a cough suppressant that can be sedating. It can be used at night to stop the tickle at the back of your throat.  You can use Mucinex D (it has guaifenesin and a high dose of pseudoephedrine) in the mornings to help decongest.        Use Afrin in each nare for no longer than 3 days, as it is addictive. It can also dry out your mucous membranes and cause elevated blood pressure. This is especially useful if you are flying.     Use Flonase 1-2 sprays/nostril per day. It is a local acting steroid nasal spray, if you develop a bloody nose, stop using the medication immediately.     Sometimes Nyquil at night is beneficial to help you get some rest, however it is sedating and it does have an antihistamine, and tylenol.     Honey is a natural cough suppressant that can be used.     Tylenol up to 4,000 mg a day is safe for short periods and can be used for body aches, pain, and fever. However in high doses and prolonged use it can cause liver irritation.     Ibuprofen is a non-steroidal anti-inflammatory that can be used for body aches, pain, and fever.However it can also cause stomach irritation if over used.         THANK YOU FOR ALLOWING ME TO PARTICIPATE IN YOUR HEALTHCARE,     Jamie Pelaez NP     COVID-19 virus infection  -     molnupiravir 200 mg capsule (EUA); Take 4 capsules (800 mg total) by mouth every 12 (twelve) hours. for 5 days  Dispense: 40 capsule; Refill: 0    Sore throat  -      POCT COVID-19 Rapid Screening

## 2022-09-26 NOTE — PATIENT INSTRUCTIONS
INSTRUCTIONS:  - Rest.  - Drink plenty of fluids.  - Take Tylenol and/or Ibuprofen as directed as needed for fever/pain.  Do not take more than the recommended dose.  - follow up with your PCP within the next 1-2 weeks as needed.  - You must understand that you have received an Urgent Care treatment only and that you may be released before all of your medical problems are known or treated.   - You, the patient, will arrange for follow up care as instructed.   - If your condition worsens or fails to improve we recommend that you receive another evaluation at the ER immediately or contact your PCP to discuss your concerns.   - You can call [phone] or [phone] to help schedule an appointment with the appropriate provider.       OVER THE COUNTER RECOMMENDATIONS/SUGGESTIONS.     Make sure to stay well hydrated.     Use Nasal Saline to mechanically move any post nasal drip from your eustachian tube or from the back of your throat.     Use warm salt water gargles to ease your throat pain. Warm salt water gargles as needed for sore throat-  1/2 tsp salt to 1 cup warm water, gargle as desired.     Use an antihistamine such as Claritin, Zyrtec or Allegra to dry you out.      Use pseudoephedrine (behind the counter) to decongest. Pseudoephedrine  30 mg up to 240 mg /day. It can raise your blood pressure and give you palpitations.     Use mucinex (guaifenisin) to break up mucous up to 2400mg/day to loosen any mucous.   The mucinex DM pill has a cough suppressant that can be sedating. It can be used at night to stop the tickle at the back of your throat.  You can use Mucinex D (it has guaifenesin and a high dose of pseudoephedrine) in the mornings to help decongest.        Use Afrin in each nare for no longer than 3 days, as it is addictive. It can also dry out your mucous membranes and cause elevated blood pressure. This is especially useful if you are flying.     Use Flonase 1-2 sprays/nostril per day. It is a  local acting steroid nasal spray, if you develop a bloody nose, stop using the medication immediately.     Sometimes Nyquil at night is beneficial to help you get some rest, however it is sedating and it does have an antihistamine, and tylenol.     Honey is a natural cough suppressant that can be used.     Tylenol up to 4,000 mg a day is safe for short periods and can be used for body aches, pain, and fever. However in high doses and prolonged use it can cause liver irritation.     Ibuprofen is a non-steroidal anti-inflammatory that can be used for body aches, pain, and fever.However it can also cause stomach irritation if over used.

## 2022-10-19 NOTE — TELEPHONE ENCOUNTER
----- Message from Africa Barrera sent at 7/18/2022  2:35 PM CDT -----  Contact: Pt  Type:  Sooner Appointment Request    Name of Caller:  Pt  When is the first available appointment?  Pt has appt on 8/11, but wants to be seen sooner to discuss angiogram results  Symptoms:  Angiogram results  Best Call Back Number:  [phone]  Additional Information:  Please call back.  Thanks.      What Is The Reason For Today's Visit?: Skin Cancer Screening Exam What Is The Reason For Today's Visit? (Being Monitored For X): the development of skin cancers Additional History: Patient presents today for her 6 month skin exam. Patient had MOHS on 9/26/22 with Dr. Holm.\\n\\nPatient was screened before evaluation for COVID-19 by inquiring about fever, shortness of breath, weakness, fatigue, loss of taste or smell and gastrointestinal symptoms.  Patient denied any of the above.

## 2022-11-22 PROBLEM — I82.401 DEEP VEIN THROMBOSIS (DVT) OF RIGHT LOWER EXTREMITY: Status: ACTIVE | Noted: 2022-11-22

## 2022-11-22 PROBLEM — Z71.89 ACP (ADVANCE CARE PLANNING): Status: ACTIVE | Noted: 2022-11-22

## 2022-11-22 PROBLEM — R09.02 HYPOXIA: Status: ACTIVE | Noted: 2022-11-22

## 2022-11-22 PROBLEM — I10 ESSENTIAL HYPERTENSION: Status: ACTIVE | Noted: 2022-11-22

## 2022-11-22 PROBLEM — J45.909 ASTHMA: Status: ACTIVE | Noted: 2022-11-22

## 2022-11-22 PROBLEM — I50.32 CHRONIC DIASTOLIC CHF (CONGESTIVE HEART FAILURE): Status: ACTIVE | Noted: 2022-08-22

## 2022-11-22 PROBLEM — I26.92 SADDLE PULMONARY EMBOLUS: Status: ACTIVE | Noted: 2022-11-22

## 2022-11-22 PROBLEM — L03.115 CELLULITIS OF RIGHT LOWER EXTREMITY: Status: ACTIVE | Noted: 2022-11-22

## 2022-11-30 ENCOUNTER — TELEPHONE (OUTPATIENT)
Dept: OBSTETRICS AND GYNECOLOGY | Facility: CLINIC | Age: 67
End: 2022-11-30
Payer: MEDICARE

## 2022-11-30 NOTE — TELEPHONE ENCOUNTER
----- Message from Edel Casas sent at 11/29/2022  3:07 PM CST -----  Contact: Pt at [phone]  Type: Needs Medical Advice  Who Called:  Pt  Best Call Back Number: [phone]  Additional Information: Pt was in Hospital and was taken off medication.   at ER advised to see an GYN.  There was no appt available  Pt is wanting to come in as a New Client.   Please call back to advise.

## 2023-01-04 ENCOUNTER — OFFICE VISIT (OUTPATIENT)
Dept: OBSTETRICS AND GYNECOLOGY | Facility: CLINIC | Age: 68
End: 2023-01-04
Payer: MEDICARE

## 2023-01-04 VITALS
WEIGHT: 255.5 LBS | BODY MASS INDEX: 42.57 KG/M2 | SYSTOLIC BLOOD PRESSURE: 132 MMHG | DIASTOLIC BLOOD PRESSURE: 76 MMHG | HEIGHT: 65 IN

## 2023-01-04 DIAGNOSIS — Z87.448 HISTORY OF PROLAPSE OF BLADDER: ICD-10-CM

## 2023-01-04 DIAGNOSIS — Z01.419 WELL WOMAN EXAM: Primary | ICD-10-CM

## 2023-01-04 DIAGNOSIS — N81.11 CYSTOCELE, MIDLINE: ICD-10-CM

## 2023-01-04 PROCEDURE — 88175 CYTOPATH C/V AUTO FLUID REDO: CPT | Performed by: SPECIALIST

## 2023-01-04 PROCEDURE — 99214 OFFICE O/P EST MOD 30 MIN: CPT | Mod: PBBFAC,PN | Performed by: SPECIALIST

## 2023-01-04 PROCEDURE — G0101 PR CA SCREEN;PELVIC/BREAST EXAM: ICD-10-PCS | Mod: S$PBB,,, | Performed by: SPECIALIST

## 2023-01-04 PROCEDURE — G0101 CA SCREEN;PELVIC/BREAST EXAM: HCPCS | Mod: S$PBB,,, | Performed by: SPECIALIST

## 2023-01-04 PROCEDURE — 99999 PR PBB SHADOW E&M-EST. PATIENT-LVL IV: CPT | Mod: PBBFAC,,, | Performed by: SPECIALIST

## 2023-01-04 PROCEDURE — 87624 HPV HI-RISK TYP POOLED RSLT: CPT | Performed by: SPECIALIST

## 2023-01-04 PROCEDURE — 99999 PR PBB SHADOW E&M-EST. PATIENT-LVL IV: ICD-10-PCS | Mod: PBBFAC,,, | Performed by: SPECIALIST

## 2023-01-04 NOTE — PROGRESS NOTES
68 yo obese WF G0 presents for exam and eval subjective urinary incont which occurs on occaion with starin. Pt denies overt nocturia, hematuria, dysuria, UI. Pt was on unopposed ERT for several years and dc/d after DVT Pt denies PMB  Past Medical History:   Diagnosis Date    Angina pectoris     Anxiety     Aortic valve stenosis     mild-moderate on echo 6/2022    Asthma     CAD (coronary artery disease)     stents; cabg    Chronic diastolic CHF (congestive heart failure)     Chronic pain     Claustrophobia     Coronary artery disease     COVID-19 09/2022    Diverticulitis     Herpes simplex virus (HSV) infection     Hyperlipidemia     Hypothyroidism     Mental disorder     Morbid obesity     Osteopenia     Pneumonia     Primary hypertension     Primary osteoarthritis of left knee 06/24/2021    Restrictive lung disease secondary to obesity 08/29/2022       Past Surgical History:   Procedure Laterality Date    CORONARY ANGIOGRAPHY INCLUDING BYPASS GRAFTS WITH CATHETERIZATION OF LEFT HEART N/A 07/11/2022    Procedure: ANGIOGRAM, CORONARY, INCLUDING BYPASS GRAFT, WITH LEFT HEART CATHETERIZATION;  Surgeon: Sophie Martinez MD;  Location: [facility] CATH;  Service: Cardiology;  Laterality: N/A;    CORONARY ANGIOPLASTY WITH STENT PLACEMENT Right     CORONARY ARTERY BYPASS GRAFT      double     JOINT REPLACEMENT Right     total    JOINT REPLACEMENT Left     patital        Family History   Problem Relation Age of Onset    Atrial fibrillation Mother     Thyroid disease Mother     Osteoporosis Mother     Anxiety disorder Mother     Heart disease Father     Anxiety disorder Father     Mental illness Maternal Uncle         schizophrenia     Anxiety disorder Maternal Grandmother     Ulcers Maternal Grandmother     Cancer Paternal Aunt     Diabetes Paternal Aunt        Social History     Socioeconomic History    Marital status:      Spouse name: Mynor   Tobacco Use    Smoking status: Never    Smokeless tobacco: Never   Substance  and Sexual Activity    Alcohol use: Not Currently    Drug use: Never       Current Outpatient Medications   Medication Sig Dispense Refill    acetaminophen (TYLENOL) 500 MG tablet Take 500 mg by mouth 2 (two) times a day.      albuterol (PROVENTIL) 2.5 mg /3 mL (0.083 %) nebulizer solution Take 3 mLs (2.5 mg total) by nebulization every 6 (six) hours as needed for Wheezing. Rescue 360 each 3    ALPRAZolam (XANAX) 0.5 MG tablet Take 1 tablet (0.5 mg total) by mouth 2 (two) times daily as needed for Anxiety. 60 tablet 2    apixaban (ELIQUIS) 5 mg Tab Take 1 tablet (5 mg total) by mouth 2 (two) times daily. 180 tablet 3    ascorbic acid, vitamin C, (VITAMIN C) 500 MG tablet Take 500 mg by mouth once daily.      aspirin (ECOTRIN) 81 MG EC tablet Take 81 mg by mouth once daily.      atorvastatin (LIPITOR) 80 MG tablet Take 1 tablet (80 mg total) by mouth once daily. 90 tablet 3    benzonatate (TESSALON) 200 MG capsule Take 1 capsule (200 mg total) by mouth 3 (three) times daily as needed for Cough. 90 capsule 3    calcium carbonate (OS-RAY) 600 mg calcium (1,500 mg) Tab Take 600 mg by mouth 2 (two) times daily with meals.      dicyclomine (BENTYL) 10 MG capsule Take 1 capsule (10 mg total) by mouth 3 (three) times daily as needed (cramping). 30 capsule 3    fluticasone furoate-vilanteroL (BREO ELLIPTA) 100-25 mcg/dose diskus inhaler Inhale 1 puff into the lungs once daily. Controller 90 each 3    fluticasone propionate (FLONASE) 50 mcg/actuation nasal spray 1 spray (50 mcg total) by Each Nostril route 2 (two) times a day. 48 g 3    furosemide (LASIX) 20 MG tablet Take 1 tablet (20 mg total) by mouth once daily. 90 tablet 3    levalbuterol (XOPENEX HFA) 45 mcg/actuation inhaler Inhale 1-2 puffs into the lungs every 6 (six) hours as needed for Wheezing. Rescue 45 g 3    levocetirizine (XYZAL) 5 MG tablet Take 5 mg by mouth every evening.      levothyroxine (SYNTHROID) 150 MCG tablet Take 1 tablet (150 mcg total) by mouth  "before breakfast. 90 tablet 0    magnesium oxide 500 mg Tab Take 1 tablet by mouth once daily.      metoprolol succinate (TOPROL-XL) 25 MG 24 hr tablet Take 1 tablet (25 mg total) by mouth once daily. 90 tablet 3    montelukast (SINGULAIR) 10 mg tablet Take 1 tablet (10 mg total) by mouth every evening. 90 tablet 3    multivitamin (THERAGRAN) per tablet Take 1 tablet by mouth once daily.      propylene glycoL, PF, (SYSTANE COMPLETE PF) 0.6 % Drop Apply 1-2 drops to eye as needed (dry eyes).      ramipriL (ALTACE) 5 MG capsule Take 1 capsule (5 mg total) by mouth once daily. 90 capsule 3    ranolazine (RANEXA) 500 MG Tb12 Take 500 mg by mouth 2 (two) times daily.      vitamin D (VITAMIN D3) 1000 units Tab Take 1,000 Units by mouth once daily.      nitroGLYCERIN 0.4 MG/DOSE TL SPRY (NITROLINGUAL) 400 mcg/spray spray Place 2 sprays under the tongue every 5 (five) minutes as needed for Chest pain. 0.004 g 12     No current facility-administered medications for this visit.       Review of patient's allergies indicates:   Allergen Reactions    Compazine [prochlorperazine edisylate]      "Facial paralysis"       Review of System:   General: no chills, fever, night sweats, weight gain or weight loss  Psychological: no depression or suicidal ideation  Breasts: no new or changing breast lumps, nipple discharge or masses.  Respiratory: no cough, shortness of breath, or wheezing  Cardiovascular: no chest pain or dyspnea on exertion  Gastrointestinal: no abdominal pain, change in bowel habits, or black or bloody stools  Genito-Urinary: as above  Musculoskeletal: no gait disturbance or muscular weakness                                              General Appearance    A and O x 4, Cooperative, no distress   Breasts    Abdomen   Deferred    Soft, non-tender, bowel sounds active all four quadrants,  no masses, no organomegaly    Genitourinary:   External rectal exam shows no thrombosed external hemorrhoids.   Pelvic exam was " performed with patient supine.  No labial fusion.  There is no rash, lesion or injury on the right labia.  There is no rash, lesion or injury on the left labia.  No bleeding and no signs of injury around the vaginal introitus, urethra is without lesions and well supported. The cervix is visualized with no discharge, lesions or friability.  No vaginal discharge found.  Grade 1 -2 Cystocele, No Enterocele or rectocele, and uterus well supported.  Bimanual exam:  The urethra is normal to palpation and there are no palpable vaginal wall masses.  Uterus is not deviated, not enlarged, not fixed, normal shape and not tender.  Cervix exhibits no motion tenderness.   Right adnexum displays no mass and no tenderness.  Left adnexum displays no mass and no tenderness.   Extremities: Extremities normal, atraumatic, no cyanosis or edema                     NOTE  NURSING PERSONAL PRESENT FOR ENTIRE PHYSICAL EXAM      I discussed prolpase and weight related LISSETT I do not rec surgical intervention as cystocle is minimal and weight is contributing factor  I did discussed risks factors for endometrial Ca and rec pelvic u/s for EMYT and possible EMB based on that   Discussed osteopenia prevention  Answered all questions and will follow

## 2023-01-10 LAB
FINAL PATHOLOGIC DIAGNOSIS: NORMAL
Lab: NORMAL

## 2023-01-12 ENCOUNTER — TELEPHONE (OUTPATIENT)
Dept: OBSTETRICS AND GYNECOLOGY | Facility: CLINIC | Age: 68
End: 2023-01-12
Payer: MEDICARE

## 2023-01-12 LAB
HPV HR 12 DNA SPEC QL NAA+PROBE: NEGATIVE
HPV16 AG SPEC QL: NEGATIVE
HPV18 DNA SPEC QL NAA+PROBE: NEGATIVE

## 2023-01-12 NOTE — TELEPHONE ENCOUNTER
----- Message from Beverley Ventura sent at 1/12/2023  2:40 PM CST -----  Contact: patient  Type: Needs Medical Advice  Who Called:  patient  Best Call Back Number: [phone]  Additional Information: patient needs to reschedule her US, would like to have it the first week of feb

## 2023-01-19 ENCOUNTER — OFFICE VISIT (OUTPATIENT)
Dept: ORTHOPEDICS | Facility: CLINIC | Age: 68
End: 2023-01-19
Payer: MEDICARE

## 2023-01-19 ENCOUNTER — HOSPITAL ENCOUNTER (OUTPATIENT)
Dept: RADIOLOGY | Facility: HOSPITAL | Age: 68
Discharge: HOME | End: 2023-01-19
Attending: ORTHOPAEDIC SURGERY
Payer: MEDICARE

## 2023-01-19 VITALS — WEIGHT: 255 LBS | HEIGHT: 65 IN | BODY MASS INDEX: 42.49 KG/M2

## 2023-01-19 DIAGNOSIS — M65.9 TENOSYNOVITIS OF RIGHT WRIST: Primary | ICD-10-CM

## 2023-01-19 DIAGNOSIS — M25.531 RIGHT WRIST PAIN: Primary | ICD-10-CM

## 2023-01-19 DIAGNOSIS — M25.531 RIGHT WRIST PAIN: ICD-10-CM

## 2023-01-19 PROCEDURE — 73110 X-RAY EXAM OF WRIST: CPT | Mod: TC,PO,RT

## 2023-01-19 PROCEDURE — 99999 PR PBB SHADOW E&M-EST. PATIENT-LVL IV: ICD-10-PCS | Mod: PBBFAC,,, | Performed by: ORTHOPAEDIC SURGERY

## 2023-01-19 PROCEDURE — 73110 X-RAY EXAM OF WRIST: CPT | Mod: 26,RT,, | Performed by: RADIOLOGY

## 2023-01-19 PROCEDURE — 99213 OFFICE O/P EST LOW 20 MIN: CPT | Mod: S$PBB,,, | Performed by: ORTHOPAEDIC SURGERY

## 2023-01-19 PROCEDURE — 99213 PR OFFICE/OUTPT VISIT, EST, LEVL III, 20-29 MIN: ICD-10-PCS | Mod: S$PBB,,, | Performed by: ORTHOPAEDIC SURGERY

## 2023-01-19 PROCEDURE — 99999 PR PBB SHADOW E&M-EST. PATIENT-LVL IV: CPT | Mod: PBBFAC,,, | Performed by: ORTHOPAEDIC SURGERY

## 2023-01-19 PROCEDURE — 99214 OFFICE O/P EST MOD 30 MIN: CPT | Mod: PBBFAC,PN | Performed by: ORTHOPAEDIC SURGERY

## 2023-01-19 PROCEDURE — 73110 XR WRIST COMPLETE 3 VIEWS RIGHT: ICD-10-PCS | Mod: 26,RT,, | Performed by: RADIOLOGY

## 2023-01-19 NOTE — PROGRESS NOTES
Ms. Casas returns to clinic today.  She has a history of right wrist pain.  She states that this started a few weeks ago.  She states that the pain is located over the volar wrist.  She states that flexion and lifting activities reproduces her pain.  She has tried some splinting without relief.  She has recently had a problem with multiple pulmonary embolisms and is on blood thinners.      Physical exam:  Examination the right wrist and hand reveals that she has multiple areas of ecchymosis.  There is no major skin change.  There is no edema.  Palpation does produce tenderness directly over the flexor tendons on the volar side of the wrist.  She has no significant dorsal tenderness.  Wrist range of motion is extension of 70° and flexion of 70°.  She has full pronation and supination.  Flexion and extension do produce mild tenderness over the flexor tendon region of the volar wrist.  She does have sensation intact in the median radial ulnar distribution.  She has negative Tinel's over the carpal tunnel     Radiology:  X-rays of the right wrist were taken in clinic today.  She is noted to have degenerative changes of the thumb CMC joint.  There are no fractures dislocations or other pathology noted     Assessment: Right wrist flexor tendinitis     Plan:    1.  She is unable to take anti-inflammatories at this time is because she has recently had bout of pulmonary embolisms and she is on anticoagulant therapy    2. I will place her into a Velcro splint for activity    3.  Will set her up with occupational therapy    4.  Follow-up in 6 weeks for repeat evaluation.

## 2023-01-31 ENCOUNTER — CLINICAL SUPPORT (OUTPATIENT)
Dept: REHABILITATION | Facility: HOSPITAL | Age: 68
End: 2023-01-31
Payer: MEDICARE

## 2023-01-31 DIAGNOSIS — Z74.09 IMPAIRED MOBILITY AND ADLS: ICD-10-CM

## 2023-01-31 DIAGNOSIS — Z78.9 IMPAIRED MOBILITY AND ADLS: ICD-10-CM

## 2023-01-31 DIAGNOSIS — M65.9 TENOSYNOVITIS OF RIGHT WRIST: ICD-10-CM

## 2023-01-31 DIAGNOSIS — R29.898 RIGHT HAND WEAKNESS: ICD-10-CM

## 2023-01-31 DIAGNOSIS — M25.531 PAIN IN RIGHT WRIST: ICD-10-CM

## 2023-01-31 PROCEDURE — 97166 OT EVAL MOD COMPLEX 45 MIN: CPT | Mod: PO

## 2023-01-31 PROCEDURE — 97110 THERAPEUTIC EXERCISES: CPT | Mod: PO

## 2023-01-31 NOTE — PLAN OF CARE
[facility] Occupational Therapy  Initial Evaluation     Date: 1/31/2023  Patient: Melissa Casas  Chart Number: [id number]    Treatment Diagnosis:   1. Tenosynovitis of right wrist  Ambulatory referral/consult to Physical/Occupational Therapy      2. Pain in right wrist        3. Right hand weakness        4. Impaired mobility and ADLs            Physician: Jules Hicks PA-C    Physician Orders:   Note    Patient has recently been experiencing flexor tendinitis of the right hand/wrist.  Patient requires occupational therapy to decrease the tenderness and increase the function of the right hand/wrist.       Duration:  6 weeks      Frequency:  2-3 times per week      Please work on range of motion, stretching, edema control, therapeutic modalities (such as TENS stimulation, thermal therapies, an ultrasound), and eventually gentle strengthening.  Thanks!          Medical Diagnosis: M65.9 (ICD-10-CM) - Tenosynovitis of right wrist    Evaluation Date: 1/31/2023  Insurance Authorization period Expiration: Calendar year  Plan of Care Expiration Period: 6 weeks = 3/15/2023  Next Re-assessment: 4 weeks = 2/27/2023 and/or 10th visit  Date of Return Referring Provider 3/1/2023    Visit # / Visits Authortized: 1 / TBD  # No shows 0 / # Cancellations: 0  Time In:1603  Time Out: 1653  Total Billable Time: 50 minutes    Precautions: Standard and Hx DVT RLE and PE on blood thinners  Surgery: N/A     S/P: Chronic    Subjective     Involved Side: R  Dominant Side: Right  Date of Onset: a few months now    Mechanism of Injury/ History of Current Condition: Gradual worsening of volar R wrist pain. Reports she has some problems with phobe use / texting. Brace use has helped some.    Other Surgical/PMHX involved UE:  None reported    Imaging: X rays:   Last X ray from:   FINDINGS:  No acute fracture or dislocation.  Marked degenerative change at the thumb carpometacarpal joint.  Mild degenerative change at the  triscaphe and 1st MCP joints. Soft tissues are unremarkable.    Previous Therapy:  None reported    Patient's Goals for Therapy:  To get RUE back to normal.    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    Current FunctionalStatus   Home/Living environment : Pt lives at home.    Limitation of Functional Status as follows:   ADLs/IADLs: Moderate overall difficulty reported with basic ADL/IADL tasks.                See FOTO results for further related to UE function.    Past Medical History/Physical Systems Review:   Melissa Casas  has a past medical history of Angina pectoris, Anxiety, Aortic valve stenosis, Asthma, CAD (coronary artery disease), Chronic diastolic CHF (congestive heart failure), Chronic pain, Claustrophobia, Coronary artery disease, COVID-19, Diverticulitis, Herpes simplex virus (HSV) infection, Hyperlipidemia, Hypothyroidism, Mental disorder, Morbid obesity, Osteopenia, Pneumonia, Primary hypertension, Primary osteoarthritis of left knee, and Restrictive lung disease secondary to obesity.    Melissa Casas  has a past surgical history that includes Joint replacement (Right); Joint replacement (Left); Coronary artery bypass graft; Coronary angioplasty with stent (Right); and Coronary angiography including bypass grafts with catheterization of left heart (N/A, 07/11/2022).    Melissa has a current medication list which includes the following prescription(s): acetaminophen, albuterol, alprazolam, apixaban, ascorbic acid (vitamin c), aspirin, atorvastatin, benzonatate,  "calcium carbonate, dicyclomine, fluticasone furoate-vilanterol, fluticasone propionate, furosemide, levalbuterol, levocetirizine, levothyroxine, magnesium oxide, metoprolol succinate, montelukast, multivitamin, nitroglycerin 0.4 mg/dose tl spry, systane complete pf, ramipril, ranolazine, and vitamin d.    Review of patient's allergies indicates:   Allergen Reactions    Compazine [prochlorperazine edisylate]      "Facial paralysis"          Objective     CMS Impairment/Limitation/Restriction for FOTO Wrist Survey    Therapist reviewed FOTO scores for Melissa Casas on 1/31/2023.   FOTO documents entered into [employer] - see Media section.    Limitation Score: 32%      Observation/Inspection/Wound/Incision/Scar   Mild to moderate edema,  non pitting, skin intact other than some small closed areas of bruising petechia related to brace wear per pt report.    Sensation: Grossly WNL,    Edema:          Circumferential (in cm) L R   Proximal Wrist Crease 16.3 16.5   MPs NT NT   Mid P1 of  Long Finger NT NT      AROM Hand: Tip to palm/DPC digits 1-5 (in cm)   Left Right   1st Intact 1-5  Intact 1-5    2nd     3rd     4th     5th       AROM Wrist/Forearm:               Left              Right   Wrist Ext/Flex:  80/80° Ext/Flex:  70/75°        Forearm Pron/Sup  WNL° Pro/Sup:  WNL°     AROM Elbow:                Left              Right   Elbow Ext/Flex  WNL° Ext/Flex:  WNL°         Manual Muscle Test: NT                                       and Pinch Strength (in pounds, psi's):   Left Right    II 25 20/21        Lateral NT NT        Tripod  NT NT     Special and/or Standardized Tests:  NT      Treatment     Treatment Time In:  1643  Treatment Time Out:  1653  Total Treatment time separate from Evaluation time:10 minutes.    Melissa received therapeutic exercises for 10 minutes including: Initial Home Exercise Program Instruction.  Wrist AROM: flex/ext and UD/RD, thumb slides down small finger 2x10 3-4 x day  Tendon gliding " Exercises: Full sequence with handout and return demo for 2x10 recommended 3-4 x day.    Home Exercise Program/Education:  Issued HEP (see patient instructions in EMR) and educated on modality use for pain management . Exercises were reviewed and Melissa was able to demonstrate them prior to the end of the session.   Pt received a written copy of exercises to perform at home. Melissa demonstrated good  understanding of the education provided.  Pt was advised to perform these exercises free of pain, and to stop performing them if pain occurs.    Patient/Family Education: role of OT, goals for OT, scheduling/cancellations - pt verbalized understanding.     Additional Education provided: Heat 5 min early am and midday cold pack in pm for any increased pain or swelling R wrist.    Assessment     Melissa Casas is a 67 y.o. female referred to outpatient occupational/hand therapy and presents with a medical diagnosis of Tenosynovitis of right wrist resulting in, pain, edema, decreased A/PROM, strength, and functional use of involved upper extremity/extremities) and demonstrates limitations as described in the chart below.     The patient's rehab potential is good for the goals listed below.    No anticipated barriers to occupational therapy.  Pt has no cultural, educational or language barriers to learning provided.    Profile and History Assessment of Occupational Performance Level of Clinical Decision Making Complexity Score   Occupational Profile:   Melissa Casas is a 67 y.o. female who was Independent with all ADL/IADL prior to onset of symptoms/injury . Pt is currently  reporting Max difficulty with RUE use affecting her daily functional abilities. Her main goal for therapy is regain Independent use of her RUE.    Comorbidities:    has a past medical history of Angina pectoris, Anxiety, Aortic valve stenosis, Asthma, CAD (coronary artery disease), Chronic diastolic CHF (congestive heart failure), Chronic pain,  Claustrophobia, Coronary artery disease, COVID-19, Diverticulitis, Herpes simplex virus (HSV) infection, Hyperlipidemia, Hypothyroidism, Mental disorder, Morbid obesity, Osteopenia, Pneumonia, Primary hypertension, Primary osteoarthritis of left knee, and Restrictive lung disease secondary to obesity.  Medical and Therapy History Review:   Expanded               Performance Deficits    Physical:  Joint Mobility  Muscle Power/Strength  Muscle Endurance  Edema   Strength    Cognitive:  No Deficits    Psychosocial:    No Deficits     Clinical Decision Making:  moderate    Assessment Process:  Detailed Assessments    Modification/Need for Assistance:  Minimal-Moderate Modifications/Assistance    Intervention Selection:  Several Treatment Options       moderate  Based on PMHX, co morbidities , data from assessments and functional level of assistance required with task and clinical presentation directly impacting function.       The following goals were discussed with the patient and patient is in agreement with them as to be addressed in the treatment plan.     Goals:     Short Term Goals: (4 weeks = 2/27/2023 and/or 10th visit) unless otherwise noted below.  1. Pt will be independent with HEP in 2 visits.  2. Pt will report decreased pain to a 7/10 at worst in RUE with ADLs in order to increase function/use of UE.   3. Pt will increase AROM wrist extension by 10 degrees (to 80 degrees) to increase function for ADL/IADL.  4. Pt will increase AROM wrist flexion by 5 degrees (to 80 degrees) to increase function for ADL/IADL.  5  Pt will demo increased R hand  strength of 5# (to 26#) in order to increase function with grasp during ADL/IADL tasks (cooking utensils, tool use, carrying groceries, steering wheel, etc.)     Long Term Goals: (by discharge)  1. Pt will report decreased pain to 1-2/10 with ADLs to allow for increased function/use of UE.   2. Pt will exhibit WNL  AROM of R Wrist to allow for Independent use  of for all ADL/IADL tasks.  3. Pt will exhibit at least 30# / WFL  strength R hand to allow a firm grasp during ADL/IADL (cooking utensils, tool use, carrying groceries, steering wheel, etc.)  4. Pt will return to PLOF with all ADL/IADL, leisure and home mgmt tasks.    Plan   Certification Period/Plan of care expiration:  1/31/2023 to 5/01/2023 with POC expiring on 3/13/2023    Outpatient Occupational Therapy 2-3 times weekly through current poc expiration date, to include the following interventions:     Moist heat, cold packs, paraffin, fluidotherapy, US, edema control, scar mobilization/scar massage, manual therapy/joint mobilizations,A/PROM, therapeutic exercises/activities, strengthening, desensitization/sensory re-education, orthotic fitting/fabrication/training  PRN, joint protections/energry conservation/adaptive equipment/activity modification,HEP/education as well as any other treatments deemed necessary based on the patient's needs or progress.     Pt may be discharged prior to poc expiration date if all goals/desired outcome met or if max rehabilitation potential has been achieved.    Updates Next Visit: To review HEP understanding and compliance and progress with OT tx as tolerated.    KATALINA Salomon, RANDY    I CERTIFY THE NEED FOR THESE SERVICES FURNISHED UNDER THIS PLAN OF TREATMENT AND WHILE UNDER MY CARE    Physician's comments prn:

## 2023-02-07 ENCOUNTER — CLINICAL SUPPORT (OUTPATIENT)
Dept: REHABILITATION | Facility: HOSPITAL | Age: 68
End: 2023-02-07
Payer: MEDICARE

## 2023-02-07 DIAGNOSIS — M25.531 PAIN IN RIGHT WRIST: Primary | ICD-10-CM

## 2023-02-07 DIAGNOSIS — R29.898 RIGHT HAND WEAKNESS: ICD-10-CM

## 2023-02-07 DIAGNOSIS — Z74.09 IMPAIRED MOBILITY AND ADLS: ICD-10-CM

## 2023-02-07 DIAGNOSIS — Z78.9 IMPAIRED MOBILITY AND ADLS: ICD-10-CM

## 2023-02-07 PROCEDURE — 97110 THERAPEUTIC EXERCISES: CPT | Mod: PO

## 2023-02-07 PROCEDURE — 97014 ELECTRIC STIMULATION THERAPY: CPT | Mod: PO

## 2023-02-07 PROCEDURE — 97530 THERAPEUTIC ACTIVITIES: CPT | Mod: PO

## 2023-02-07 NOTE — PROGRESS NOTES
Occupational Therapy Daily Treatment Note     Visit Date: 2/7/2023  Name: Melissa Casas  Clinic Number: [id number]    Therapy Diagnosis:   Encounter Diagnoses   Name Primary?    Pain in right wrist Yes    Right hand weakness     Impaired mobility and ADLs      Physician: Jules Hicks PA-C    Physician Orders:       Note     Patient has recently been experiencing flexor tendinitis of the right hand/wrist.  Patient requires occupational therapy to decrease the tenderness and increase the function of the right hand/wrist.       Duration:  6 weeks      Frequency:  2-3 times per week      Please work on range of motion, stretching, edema control, therapeutic modalities (such as TENS stimulation, thermal therapies, an ultrasound), and eventually gentle strengthening.  Thanks!            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    CMS Impairment/Limitation/Restriction for FOTO Wrist Survey     Therapist reviewed FOTO scores for Melissa Casas on 1/31/2023.   FOTO documents entered into [employer] - see Media section.     Limitation  Score: 32%        Observation/Inspection/Wound/Incision/Scar   Mild to moderate edema,  non pitting, skin intact other than some small closed areas of bruising petechia related to brace wear per pt report.     Sensation: Grossly WNL,     Edema:            Circumferential (in cm) L R   Proximal Wrist Crease 16.3 16.5   MPs NT NT   Mid P1 of  Long Finger NT NT      AROM Hand: Tip to palm/DPC digits 1-5 (in cm)    Left Right   1st Intact 1-5  Intact 1-5    2nd       3rd       4th       5th          AROM Wrist/Forearm:                Left              Right   Wrist Ext/Flex:  80/80° Ext/Flex:  70/75°           Forearm Pron/Sup  WNL° Pro/Sup:  WNL°      AROM Elbow:                 Left              Right   Elbow Ext/Flex  WNL° Ext/Flex:  WNL°         Manual Muscle Test: NT                                       and Pinch Strength (in pounds, psi's):    Left Right    II 25 20/21           Lateral NT NT           Tripod  NT NT      Special and/or Standardized Tests:  NT       TREATMENT  Melissa received the following supervised modalities after being cleared for contradictions for 0 minutes:     -E-Stim unattended 10 min IFC level 4 Combo to radial/dorsal wrist.      Melissa received the following direct contact modalities after being cleared for contraindications for 0 minutes:  -NT    Melissa received the following manual therapy techniques for 0 minutes:   -NT    Melissa received therapeutic exercises for 10 minutes including:  - AAROM wrist flexion ext and forearm supination with 1/2 wt to 1# wt or small can 10x10 sec each 3-4 x day     Melissa participated in dynamic functional therapeutic activities to improve functional performance for 20 minutes, including:  - Wrist wheel forward and back 2 min each  - Hand helper 1 green band 5x5     Home Exercises and Education Provided     Education provided:   -  Cont per previous Add  AAROM wrist flexion ext and forearm supination with 1/2 wt to 1# wt or small can 10x10 sec each  3-4 x day .    Written Home Exercises Provided:  Patient instructed to cont prior HEP and add  AAROM wrist flexion ext and forearm supination with 1/2 wt to 1# wt or small can 10x10 sec each 3-4 x day .    Exercises were reviewed and Melissa was able to demonstrate them prior to the end of the session.  Melissa demonstrated good  understanding of the education provided.     See EMR under Media for exercises provided today and/or prior visit.        Assessment     Pt would continue to benefit from skilled OT. Pt tolerated progression with Tx well this date. Cont per current POC.       - Progress towards goals:  STG #1 has been met.    Melissa is progressing well towards her goals . Pt continues with good rehab potential.     Pt will continue to benefit from skilled outpatient occupational therapy to address the deficits listed in the problem list on initial evaluation in order to maximize pt's level of independence in the home and community.     Anticipated barriers to occupational therapy: None    Pt's spiritual, cultural and educational needs considered and pt agreeable to plan of care and goals.    Goals:  Short Term Goals: (4 weeks = 2/27/2023 and/or 10th visit) unless otherwise noted below.  1. Pt will be independent with HEP in 2 visits.  2. Pt will report decreased pain to a 7/10 at worst in RUE with ADLs in order to increase function/use of UE.   3. Pt will increase AROM wrist extension by 10 degrees (to 80 degrees) to increase function for ADL/IADL.  4. Pt will increase AROM wrist flexion by 5 degrees (to 80 degrees) to increase function for ADL/IADL.  5  Pt will demo increased R hand  strength of 5# (to 26#) in order to increase function with grasp during ADL/IADL tasks (cooking utensils, tool use, carrying groceries, steering wheel, etc.)      Long Term Goals: (by discharge)  1. Pt will report decreased pain to 1-2/10 with ADLs to allow for increased function/use of UE.   2. Pt will exhibit WNL  AROM of R  Wrist to allow for Independent use of for all ADL/IADL tasks.  3. Pt will exhibit at least 30# / WFL  strength R hand to allow a firm grasp during ADL/IADL (cooking utensils, tool use, carrying groceries, steering wheel, etc.)  4. Pt will return to OF with all ADL/IADL, leisure and home mgmt tasks.    Plan   Continue Occupational Therapy 2-3 times per week through current poc expiration date of 3/13/2023, in order to to decrease pain and edema, and increase A/PROM, strength, and functional use of R upper extremity.    Updates/Grading for next session:  Progress with OT as tolerated.      KATALINA Salomon, CHT

## 2023-02-08 NOTE — PROGRESS NOTES
Occupational Therapy Daily Treatment Note     Visit Date: 2/10/2023  Name: Melissa Casas  Clinic Number: [id number]    Therapy Diagnosis:   Encounter Diagnoses   Name Primary?    Pain in right wrist Yes    Right hand weakness     Impaired mobility and ADLs        Physician: Jules Hicks PA-C    Physician Orders:       Note     Patient has recently been experiencing flexor tendinitis of the right hand/wrist.  Patient requires occupational therapy to decrease the tenderness and increase the function of the right hand/wrist.       Duration:  6 weeks      Frequency:  2-3 times per week      Please work on range of motion, stretching, edema control, therapeutic modalities (such as TENS stimulation, thermal therapies, an ultrasound), and eventually gentle strengthening.  Thanks!            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    CMS Impairment/Limitation/Restriction for FOTO Wrist Survey     Therapist reviewed FOTO scores for Melissa Casas on 1/31/2023.   FOTO documents entered into [employer] - see Media section.     Limitation Score: 32%         Observation/Inspection/Wound/Incision/Scar   Mild to moderate edema,  non pitting, skin intact other than some small closed areas of bruising petechia related to brace wear per pt report.     Sensation: Grossly WNL,     Edema:            Circumferential (in cm) L R   Proximal Wrist Crease 16.3 16.5   MPs NT NT   Mid P1 of  Long Finger NT NT      AROM Hand: Tip to palm/DPC digits 1-5 (in cm)    Left Right   1st Intact 1-5  Intact 1-5    2nd       3rd       4th       5th          AROM Wrist/Forearm:                Left              Right   Wrist Ext/Flex:  80/80° Ext/Flex:  70/75°           Forearm Pron/Sup  WNL° Pro/Sup:  WNL°      AROM Elbow:                 Left              Right   Elbow Ext/Flex  WNL° Ext/Flex:  WNL°         Manual Muscle Test: NT                                       and Pinch Strength (in pounds, psi's):    Left Right    II 25 20/21           Lateral NT NT           Tripod  NT NT      Special and/or Standardized Tests:  NT       TREATMENT  Melissa received the following supervised modalities after being cleared for contradictions for 3 minutes:     Moist heat to R wrist/hand pre 3 min      Melissa received the following direct contact modalities after being cleared for contraindications for 8 minutes:  - Ultrasound: Pulsed at 50%, 0.7 W/cm2 at 3 Mhz for 8 min at  volar R wrist and forearm.    Melissa received the following manual therapy techniques for 0 minutes:   -NT    Melissa received therapeutic exercises for 10 minutes including:  - AAROM wrist flexion ext and forearm supination with 1# wt 1x10 sec-   Tendon Gliding Exercises: Full sequence 2x10     Melissa participated in dynamic functional therapeutic activities to improve functional performance for 20 minutes, including:  - Wrist wheel forward and back 3 min each  - Hand helper 1 green band 3x10  - Juxiciser 3 min    Home Exercises and Education Provided     Education provided:   -  Cont per previous     Written Home Exercises  Provided:  Patient instructed to cont prior HEP     Exercises were reviewed and Melissa was able to demonstrate them prior to the end of the session.  Melissa demonstrated good  understanding of the education provided.     See EMR under Media for exercises provided today and/or prior visit.        Assessment     Pt would continue to benefit from skilled OT. Pt tolerated progression with Tx well this date. Cont per current POC.       - Progress towards goals:  STG #1 has been met.    Melissa is progressing well towards her goals . Pt continues with good rehab potential.     Pt will continue to benefit from skilled outpatient occupational therapy to address the deficits listed in the problem list on initial evaluation in order to maximize pt's level of independence in the home and community.     Anticipated barriers to occupational therapy: None    Pt's spiritual, cultural and educational needs considered and pt agreeable to plan of care and goals.    Goals:  Short Term Goals: (4 weeks = 2/27/2023 and/or 10th visit) unless otherwise noted below.  1. Pt will be independent with HEP in 2 visits.  2. Pt will report decreased pain to a 7/10 at worst in RUE with ADLs in order to increase function/use of UE.   3. Pt will increase AROM wrist extension by 10 degrees (to 80 degrees) to increase function for ADL/IADL.  4. Pt will increase AROM wrist flexion by 5 degrees (to 80 degrees) to increase function for ADL/IADL.  5  Pt will demo increased R hand  strength of 5# (to 26#) in order to increase function with grasp during ADL/IADL tasks (cooking utensils, tool use, carrying groceries, steering wheel, etc.)      Long Term Goals: (by discharge)  1. Pt will report decreased pain to 1-2/10 with ADLs to allow for increased function/use of UE.   2. Pt will exhibit WNL  AROM of R Wrist to allow for Independent use of for all ADL/IADL tasks.  3. Pt will exhibit at least 30# / WFL  strength R hand to allow a firm grasp during  ADL/IADL (cooking utensils, tool use, carrying groceries, steering wheel, etc.)  4. Pt will return to PLOF with all ADL/IADL, leisure and home mgmt tasks.    Plan   Continue Occupational Therapy 2-3 times per week through current poc expiration date of 3/13/2023, in order to to decrease pain and edema, and increase A/PROM, strength, and functional use of R upper extremity.    Updates/Grading for next session:  Progress with OT as tolerated.      KATALINA Salomon, CHT

## 2023-02-10 ENCOUNTER — CLINICAL SUPPORT (OUTPATIENT)
Dept: REHABILITATION | Facility: HOSPITAL | Age: 68
End: 2023-02-10
Payer: MEDICARE

## 2023-02-10 DIAGNOSIS — R29.898 RIGHT HAND WEAKNESS: ICD-10-CM

## 2023-02-10 DIAGNOSIS — M25.531 PAIN IN RIGHT WRIST: Primary | ICD-10-CM

## 2023-02-10 DIAGNOSIS — Z78.9 IMPAIRED MOBILITY AND ADLS: ICD-10-CM

## 2023-02-10 DIAGNOSIS — Z74.09 IMPAIRED MOBILITY AND ADLS: ICD-10-CM

## 2023-02-10 PROCEDURE — 97035 APP MDLTY 1+ULTRASOUND EA 15: CPT | Mod: PO

## 2023-02-10 PROCEDURE — 97110 THERAPEUTIC EXERCISES: CPT | Mod: PO

## 2023-02-10 PROCEDURE — 97530 THERAPEUTIC ACTIVITIES: CPT | Mod: PO

## 2023-02-14 ENCOUNTER — CLINICAL SUPPORT (OUTPATIENT)
Dept: REHABILITATION | Facility: HOSPITAL | Age: 68
End: 2023-02-14
Payer: MEDICARE

## 2023-02-14 DIAGNOSIS — M25.531 PAIN IN RIGHT WRIST: Primary | ICD-10-CM

## 2023-02-14 DIAGNOSIS — R29.898 RIGHT HAND WEAKNESS: ICD-10-CM

## 2023-02-14 DIAGNOSIS — Z78.9 IMPAIRED MOBILITY AND ADLS: ICD-10-CM

## 2023-02-14 DIAGNOSIS — Z74.09 IMPAIRED MOBILITY AND ADLS: ICD-10-CM

## 2023-02-14 PROCEDURE — 97035 APP MDLTY 1+ULTRASOUND EA 15: CPT | Mod: PO

## 2023-02-14 PROCEDURE — 97530 THERAPEUTIC ACTIVITIES: CPT | Mod: PO

## 2023-02-14 PROCEDURE — 97110 THERAPEUTIC EXERCISES: CPT | Mod: PO

## 2023-02-14 NOTE — PROGRESS NOTES
Occupational Therapy Daily Treatment Note     Visit Date: 2/14/2023  Name: Melissa Casas  Clinic Number: [id number]    Therapy Diagnosis:   Encounter Diagnoses   Name Primary?    Pain in right wrist Yes    Right hand weakness     Impaired mobility and ADLs          Physician: Jules Hicks PA-C    Physician Orders:       Note     Patient has recently been experiencing flexor tendinitis of the right hand/wrist.  Patient requires occupational therapy to decrease the tenderness and increase the function of the right hand/wrist.       Duration:  6 weeks      Frequency:  2-3 times per week      Please work on range of motion, stretching, edema control, therapeutic modalities (such as TENS stimulation, thermal therapies, an ultrasound), and eventually gentle strengthening.  Thanks!            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    CMS Impairment/Limitation/Restriction for FOTO Wrist Survey     Therapist reviewed FOTO scores for Melissa Casas on 1/31/2023.   FOTO documents entered into [employer] - see Media section.     Limitation Score: 32%         Observation/Inspection/Wound/Incision/Scar   Mild to moderate edema,  non pitting, skin intact other than some small closed areas of bruising petechia related to brace wear per pt report.     Sensation: Grossly WNL,     Edema:            Circumferential (in cm) L R   Proximal Wrist Crease 16.3 16.5   MPs NT NT   Mid P1 of  Long Finger NT NT      AROM Hand: Tip to palm/DPC digits 1-5 (in cm)    Left Right   1st Intact 1-5  Intact 1-5    2nd       3rd       4th       5th          AROM Wrist/Forearm: 2/14/2023                Left              Right   Wrist Ext/Flex:  80/80° Ext/Flex:  75/80°           Forearm Pron/Sup  WNL° Pro/Sup:  WNL°      AROM Elbow:                 Left              Right   Elbow Ext/Flex  WNL° Ext/Flex:  WNL°         Manual Muscle Test: NT                                       and Pinch Strength (in pounds, psi's):    Left Right    II 25 20/21           Lateral NT NT           Tripod  NT NT      Special and/or Standardized Tests:  NT       TREATMENT  Melissa received the following supervised modalities after being cleared for contradictions for 3 minutes:   Moist heat to R wrist/hand pre 3 min      Melissa received the following direct contact modalities after being cleared for contraindications for 8 minutes:  - Ultrasound: Continuous at 0.6 W/cm2 at 3 Mhz for 8 min at  volar R wrist and forearm.    Melissa received the following manual therapy techniques for 0 minutes:   -NT    Melissa received therapeutic exercises for 10 minutes including:  - AAROM wrist flexion ext  with 1# wt 10x10 sec  - AROM UD 3x10 with 1# DB    Melissa participated in dynamic functional therapeutic activities to improve functional performance for 22 minutes, including:  - Wrist wheel forward and back 3 min each  - Hand helper 1 green band 4x10  - Isospheres 3 miin  - Flexbar palm up/palm down, wrist flexiion, 3x10 with yellow.   - Rolling over yellow flexbar x 2 min.    Home Exercises and Education Provided      Education provided:   -  Cont per previous     Written Home Exercises Provided:  Patient instructed to cont prior HEP     Exercises were reviewed and Melissa was able to demonstrate them prior to the end of the session.  Melissa demonstrated good  understanding of the education provided.     See EMR under Media for exercises provided today and/or prior visit.        Assessment     Pt would continue to benefit from skilled OT. Pt  with much improved AROM R wrist and tolerated progression with tx well this date.    - Progress towards goals:  STG #1 has been met.    Melissa is progressing well towards her goals . Pt continues with good rehab potential.     Pt will continue to benefit from skilled outpatient occupational therapy to address the deficits listed in the problem list on initial evaluation in order to maximize pt's level of independence in the home and community.     Anticipated barriers to occupational therapy: None    Pt's spiritual, cultural and educational needs considered and pt agreeable to plan of care and goals.    Goals:  Short Term Goals: (4 weeks = 2/27/2023 and/or 10th visit) unless otherwise noted below.  1. Pt will be independent with HEP in 2 visits.  2. Pt will report decreased pain to a 7/10 at worst in RUE with ADLs in order to increase function/use of UE.   3. Pt will increase AROM wrist extension by 10 degrees (to 80 degrees) to increase function for ADL/IADL.  4. Pt will increase AROM wrist flexion by 5 degrees (to 80 degrees) to increase function for ADL/IADL.  5  Pt will demo increased R hand  strength of 5# (to 26#) in order to increase function with grasp during ADL/IADL tasks (cooking utensils, tool use, carrying groceries, steering wheel, etc.)      Long Term Goals: (by discharge)  1. Pt will report decreased pain to 1-2/10 with ADLs to allow for increased function/use of UE.   2. Pt will exhibit WNL  AROM of R Wrist to allow for Independent use of for all ADL/IADL tasks.  3. Pt  will exhibit at least 30# / WFL  strength R hand to allow a firm grasp during ADL/IADL (cooking utensils, tool use, carrying groceries, steering wheel, etc.)  4. Pt will return to PLOF with all ADL/IADL, leisure and home mgmt tasks.    Plan   Continue Occupational Therapy 2-3 times per week through current poc expiration date of 3/13/2023, in order to to decrease pain and edema, and increase A/PROM, strength, and functional use of R upper extremity.    Updates/Grading for next session:  Progress with OT as tolerated.      KATALINA Salomon, CHT

## 2023-02-17 ENCOUNTER — CLINICAL SUPPORT (OUTPATIENT)
Dept: REHABILITATION | Facility: HOSPITAL | Age: 68
End: 2023-02-17
Payer: MEDICARE

## 2023-02-17 DIAGNOSIS — M25.531 PAIN IN RIGHT WRIST: Primary | ICD-10-CM

## 2023-02-17 DIAGNOSIS — Z78.9 IMPAIRED MOBILITY AND ADLS: ICD-10-CM

## 2023-02-17 DIAGNOSIS — Z74.09 IMPAIRED MOBILITY AND ADLS: ICD-10-CM

## 2023-02-17 DIAGNOSIS — R29.898 RIGHT HAND WEAKNESS: ICD-10-CM

## 2023-02-17 PROCEDURE — 97530 THERAPEUTIC ACTIVITIES: CPT | Mod: PO

## 2023-02-17 PROCEDURE — 97110 THERAPEUTIC EXERCISES: CPT | Mod: PO

## 2023-02-17 PROCEDURE — 97035 APP MDLTY 1+ULTRASOUND EA 15: CPT | Mod: PO

## 2023-02-17 NOTE — PROGRESS NOTES
Occupational Therapy Daily Treatment Note     Visit Date: 2/17/2023  Name: Melissa Casas  Clinic Number: [id number]    Therapy Diagnosis:   Encounter Diagnoses   Name Primary?    Pain in right wrist Yes    Right hand weakness     Impaired mobility and ADLs        Physician: Jules Hicks PA-C    Physician Orders:       Note     Patient has recently been experiencing flexor tendinitis of the right hand/wrist.  Patient requires occupational therapy to decrease the tenderness and increase the function of the right hand/wrist.       Duration:  6 weeks      Frequency:  2-3 times per week      Please work on range of motion, stretching, edema control, therapeutic modalities (such as TENS stimulation, thermal therapies, an ultrasound), and eventually gentle strengthening.  Thanks!            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    CMS Impairment/Limitation/Restriction for FOTO Wrist Survey     Therapist reviewed FOTO scores for Melissa Casas on 1/31/2023.   FOTO documents entered into [employer] - see Media section.     Limitation Score: 32%         Observation/Inspection/Wound/Incision/Scar   Mild to moderate edema,  non pitting, skin intact other than some small closed areas of bruising petechia related to brace wear per pt report.     Sensation: Grossly WNL,     Edema:            Circumferential (in cm) L R   Proximal Wrist Crease 16.3 16.5   MPs NT NT   Mid P1 of  Long Finger NT NT      AROM Hand: Tip to palm/DPC digits 1-5 (in cm)    Left Right   1st Intact 1-5  Intact 1-5    2nd       3rd       4th       5th          AROM Wrist/Forearm: 2/14/2023                Left              Right   Wrist Ext/Flex:  80/80° Ext/Flex:  75/80°           Forearm Pron/Sup  WNL° Pro/Sup:  WNL°      AROM Elbow:                 Left              Right   Elbow Ext/Flex  WNL° Ext/Flex:  WNL°         Manual Muscle Test: NT                                       and Pinch Strength (in pounds, psi's):    Left Right    II 25 20/21           Lateral NT NT           Tripod  NT NT      Special and/or Standardized Tests:  NT       TREATMENT  Melissa received the following supervised modalities after being cleared for contradictions for 3 minutes:   Moist heat to R wrist/hand pre 3 min      Melissa received the following direct contact modalities after being cleared for contraindications for 8 minutes:  - Ultrasound: Continuous at 0.7 W/cm2 at 3 Mhz for 8 min at  volar R wrist and forearm.    Melissa received the following manual therapy techniques for 0 minutes:   -NT    Melissa received therapeutic exercises for 10 minutes including:  - AAROM wrist flexion ext  with 1# wt 10x10 sec  - AROM UD 3x10 with 1# DB    Melissa participated in dynamic functional therapeutic activities to improve functional performance for 22 minutes, including:  - Wrist wheel forward and back 3 min each  - Hand helper 1 green band 4x10  - Isospheres 3 miin  - Flexbar palm up/palm down, wrist flexiion, 3x10 with yellow.   - Rolling over yellow flexbar x 2 min.  - Juxiciser 3 min    Home Exercises and Education  Provided     Education provided:   -  Cont per previous     Written Home Exercises Provided:  Patient instructed to cont prior HEP     Exercises were reviewed and Melissa was able to demonstrate them prior to the end of the session.  Melissa demonstrated good  understanding of the education provided.     See EMR under Media for exercises provided today and/or prior visit.        Assessment     Pt would continue to benefit from skilled OT. Some decreased pain reported. Cont per current poc.    - Progress towards goals:  STG #1 has been met.    Melissa is progressing well towards her goals . Pt continues with good rehab potential.     Pt will continue to benefit from skilled outpatient occupational therapy to address the deficits listed in the problem list on initial evaluation in order to maximize pt's level of independence in the home and community.     Anticipated barriers to occupational therapy: None    Pt's spiritual, cultural and educational needs considered and pt agreeable to plan of care and goals.    Goals:  Short Term Goals: (4 weeks = 2/27/2023 and/or 10th visit) unless otherwise noted below.  1. Pt will be independent with HEP in 2 visits.  2. Pt will report decreased pain to a 7/10 at worst in RUE with ADLs in order to increase function/use of UE.   3. Pt will increase AROM wrist extension by 10 degrees (to 80 degrees) to increase function for ADL/IADL.  4. Pt will increase AROM wrist flexion by 5 degrees (to 80 degrees) to increase function for ADL/IADL.  5  Pt will demo increased R hand  strength of 5# (to 26#) in order to increase function with grasp during ADL/IADL tasks (cooking utensils, tool use, carrying groceries, steering wheel, etc.)      Long Term Goals: (by discharge)  1. Pt will report decreased pain to 1-2/10 with ADLs to allow for increased function/use of UE.   2. Pt will exhibit WNL  AROM of R Wrist to allow for Independent use of for all ADL/IADL tasks.  3. Pt will exhibit at least 30#  / WFL  strength R hand to allow a firm grasp during ADL/IADL (cooking utensils, tool use, carrying groceries, steering wheel, etc.)  4. Pt will return to PLOF with all ADL/IADL, leisure and home mgmt tasks.    Plan   Continue Occupational Therapy 2-3 times per week through current poc expiration date of 3/13/2023, in order to to decrease pain and edema, and increase A/PROM, strength, and functional use of R upper extremity.    Updates/Grading for next session:  Progress with OT as tolerated.      KATALINA Salomon, CHT

## 2023-02-22 ENCOUNTER — CLINICAL SUPPORT (OUTPATIENT)
Dept: REHABILITATION | Facility: HOSPITAL | Age: 68
End: 2023-02-22
Payer: MEDICARE

## 2023-02-22 DIAGNOSIS — Z74.09 IMPAIRED MOBILITY AND ADLS: ICD-10-CM

## 2023-02-22 DIAGNOSIS — Z78.9 IMPAIRED MOBILITY AND ADLS: ICD-10-CM

## 2023-02-22 DIAGNOSIS — R29.898 RIGHT HAND WEAKNESS: ICD-10-CM

## 2023-02-22 DIAGNOSIS — M25.531 PAIN IN RIGHT WRIST: Primary | ICD-10-CM

## 2023-02-22 PROCEDURE — 97110 THERAPEUTIC EXERCISES: CPT | Mod: PO

## 2023-02-22 PROCEDURE — 97530 THERAPEUTIC ACTIVITIES: CPT | Mod: PO

## 2023-02-22 PROCEDURE — 97035 APP MDLTY 1+ULTRASOUND EA 15: CPT | Mod: PO

## 2023-02-22 NOTE — PROGRESS NOTES
Occupational Therapy Daily Treatment Note     Visit Date: 2/22/2023  Name: Melissa Casas  Clinic Number: [id number]    Therapy Diagnosis:   Encounter Diagnoses   Name Primary?    Pain in right wrist Yes    Right hand weakness     Impaired mobility and ADLs        Physician: Jules Hicks PA-C    Physician Orders:       Note     Patient has recently been experiencing flexor tendinitis of the right hand/wrist.  Patient requires occupational therapy to decrease the tenderness and increase the function of the right hand/wrist.       Duration:  6 weeks      Frequency:  2-3 times per week      Please work on range of motion, stretching, edema control, therapeutic modalities (such as TENS stimulation, thermal therapies, an ultrasound), and eventually gentle strengthening. Thanks!            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    CMS Impairment/Limitation/Restriction for FOTO Wrist Survey     Therapist reviewed FOTO scores for Melissa Casas on 1/31/2023.   FOTO documents entered into [employer] - see Media section.     Limitation Score: 32%         Observation/Inspection/Wound/Incision/Scar: Decreased edema, skin intact other than some small closed areas of bruising petechia related to brace wear per pt report.     Sensation: Grossly WNL,     Edema:  2/22/2023          Circumferential (in cm) L R   Proximal Wrist Crease 16.1 16.0   MPs NT NT   Mid P1 of  Long Finger NT NT      AROM Hand: Tip to palm/DPC digits 1-5 (in cm)    Left Right   1st Intact 1-5  Intact 1-5    2nd       3rd       4th       5th          AROM Wrist/Forearm: 2/14/2023                Left              Right   Wrist Ext/Flex:  80/80° Ext/Flex:  75/80°           Forearm Pron/Sup  WNL° Pro/Sup:  WNL°      AROM Elbow:                 Left              Right   Elbow Ext/Flex  WNL° Ext/Flex:  WNL°         Manual Muscle Test: NT                                       and Pinch Strength (in pounds, psi's):    Left Right    II 25 20/21           Lateral NT NT           Tripod  NT NT      Special and/or Standardized Tests:  NT       TREATMENT  Melissa received the following supervised modalities after being cleared for contradictions for 3 minutes:   Moist heat to R wrist/hand pre 3 min      Melissa received the following direct contact modalities after being cleared for contraindications for 8 minutes:  - Ultrasound: Continuous at 0.7 W/cm2 at 3 Mhz for 8 min at  volar R wrist and forearm.    Melissa received the following manual therapy techniques for 0 minutes:   -NT    Melissa received therapeutic exercises for 10 minutes including:  - AAROM wrist flexion with 1# wt and ext 2# wt [id number] sec each  - AROM UD 3x10 with 1# DB (NT)    Melissa participated in dynamic functional therapeutic activities to improve functional performance for 20 minutes, including:  - Wrist wheel forward and back 3 min each  - Hand helper 1 green band and 1 red band 4x8  - Isospheres 3 miin  - Flexbar palm up/palm down 3x10 with yellow.   - Rolling over yellow flexbar x 2 min.  - Juxiciser 3 min (NT)    Home Exercises and  Education Provided     Education provided:   -  Cont per previous     Written Home Exercises Provided:  Patient instructed to cont prior HEP     Exercises were reviewed and Melissa was able to demonstrate them prior to the end of the session.  Melissa demonstrated good  understanding of the education provided.     See EMR under Media for exercises provided today and/or prior visit.        Assessment     Pt would continue to benefit from skilled OT. Some decreased edema at R wrist. Cont per current poc.    - Progress towards goals:  STG #1 has been met.    Melissa is progressing well towards her goals . Pt continues with good rehab potential.     Pt will continue to benefit from skilled outpatient occupational therapy to address the deficits listed in the problem list on initial evaluation in order to maximize pt's level of independence in the home and community.     Anticipated barriers to occupational therapy: None    Pt's spiritual, cultural and educational needs considered and pt agreeable to plan of care and goals.    Goals:  Short Term Goals: (4 weeks = 2/27/2023 and/or 10th visit) unless otherwise noted below.  1. Pt will be independent with HEP in 2 visits.  2. Pt will report decreased pain to a 7/10 at worst in RUE with ADLs in order to increase function/use of UE.   3. Pt will increase AROM wrist extension by 10 degrees (to 80 degrees) to increase function for ADL/IADL.  4. Pt will increase AROM wrist flexion by 5 degrees (to 80 degrees) to increase function for ADL/IADL.  5  Pt will demo increased R hand  strength of 5# (to 26#) in order to increase function with grasp during ADL/IADL tasks (cooking utensils, tool use, carrying groceries, steering wheel, etc.)      Long Term Goals: (by discharge)  1. Pt will report decreased pain to 1-2/10 with ADLs to allow for increased function/use of UE.   2. Pt will exhibit WNL  AROM of R Wrist to allow for Independent use of for all ADL/IADL tasks.  3. Pt will exhibit  at least 30# / WFL  strength R hand to allow a firm grasp during ADL/IADL (cooking utensils, tool use, carrying groceries, steering wheel, etc.)  4. Pt will return to PLOF with all ADL/IADL, leisure and home mgmt tasks.    Plan   Continue Occupational Therapy 2-3 times per week through current poc expiration date of 3/13/2023, in order to to decrease pain and edema, and increase A/PROM, strength, and functional use of R upper extremity.    Updates/Grading for next session:  Progress with OT as tolerated.      KATALINA Salomon, CHT

## 2023-02-27 ENCOUNTER — CLINICAL SUPPORT (OUTPATIENT)
Dept: REHABILITATION | Facility: HOSPITAL | Age: 68
End: 2023-02-27
Payer: MEDICARE

## 2023-02-27 DIAGNOSIS — Z78.9 IMPAIRED MOBILITY AND ADLS: ICD-10-CM

## 2023-02-27 DIAGNOSIS — Z74.09 IMPAIRED MOBILITY AND ADLS: ICD-10-CM

## 2023-02-27 DIAGNOSIS — M25.531 PAIN IN RIGHT WRIST: Primary | ICD-10-CM

## 2023-02-27 DIAGNOSIS — R29.898 RIGHT HAND WEAKNESS: ICD-10-CM

## 2023-02-27 PROBLEM — I26.92 SADDLE PULMONARY EMBOLUS: Status: RESOLVED | Noted: 2022-11-22 | Resolved: 2023-02-27

## 2023-02-27 PROCEDURE — 97110 THERAPEUTIC EXERCISES: CPT | Mod: PO

## 2023-02-27 PROCEDURE — 97530 THERAPEUTIC ACTIVITIES: CPT | Mod: PO

## 2023-02-27 PROCEDURE — 97140 MANUAL THERAPY 1/> REGIONS: CPT | Mod: PO

## 2023-02-27 NOTE — PROGRESS NOTES
Occupational Therapy Reassessment and Daily Treatment Note     Visit Date: 2/27/2023  Name: Melissa Casas  Clinic Number: [id number]    Therapy Diagnosis:   Encounter Diagnoses   Name Primary?    Pain in right wrist Yes    Right hand weakness     Impaired mobility and ADLs        Physician: Jules Hicks PA-C    Physician Orders:       Note     Patient has recently been experiencing flexor tendinitis of the right hand/wrist.  Patient requires occupational therapy to decrease the tenderness and increase the function of the right hand/wrist.       Duration:  6 weeks      Frequency:  2-3 times per week      Please work on range of motion, stretching, edema control, therapeutic modalities (such as TENS stimulation, thermal therapies, an ultrasound), and eventually gentle strengthening. Thanks!            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    CMS Impairment/Limitation/Restriction for FOTO Wrist Survey     Therapist reviewed FOTO scores for Melissa Casas on 1/31/2023.   FOTO documents entered into [employer] - see Media section.     Eval Limitation Score: 32%    6th visit /  Reassessment  Limitation Score: 47%        Observation/Inspection/Wound/Incision/Scar: Decreased edema, skin intact other than some small closed areas of bruising petechia related to brace wear per pt report.     Sensation: Grossly WNL,     Edema:  2/22/2023          Circumferential (in cm) L R   Proximal Wrist Crease 16.1 16.0   MPs NT NT   Mid P1 of  Long Finger NT NT      AROM Hand: Tip to palm/DPC digits 1-5 (in cm)    Left Right   1st Intact 1-5  Intact 1-5    2nd       3rd       4th       5th           AROM Wrist/Forearm: 2/272023                Left              Right   Wrist Ext/Flex:  80/80° Ext/Flex:  80/80°           Forearm Pron/Sup  WNL° Pro/Sup:  WNL°      AROM Elbow:                 Left              Right   Elbow Ext/Flex  WNL° Ext/Flex:  WNL°         Manual Muscle Test: NT                                       and Pinch Strength (in pounds, psi's): 2/27/2023    Left Right    II 34 20/21           Lateral NT NT           Tripod  NT NT      Special and/or Standardized Tests:  NT       TREATMENT  Melissa received the following supervised modalities after being cleared for contradictions for 3 minutes:   Moist heat to R wrist/hand pre 3 min      Melissa received the following direct contact modalities after being cleared for contraindications for 0 minutes:  - Ultrasound: Continuous at 0.7 W/cm2 at 3 Mhz for 8 min at volar R wrist and forearm. (NT)    Melissa received the following manual therapy techniques for 8 minutes:   - STM and retrograde massage to volar wrist /hand    Melissa received therapeutic exercises for 10 minutes including:  - AAROM wrist flexion and ext trhough full ROM 3x10 eah with 2# DB  - AROM UD 3x10 with 1# DB (NT)    Melissa participated in dynamic functional therapeutic activities to improve functional performance for 20 minutes, including:  - Wrist wheel forward and back 3 min each  - Hand helper 5x5 with 1 blue band   - Isospheres 3 miin  - Flexbar palm up/palm down wrist flex  an ext 3x10 with yellow,  - Rolling over yellow flexbar x 2 min.  - Juxiciser 3 min (NT)    Home Exercises and Education Provided     Education provided:   -  Cont per previous     Written Home Exercises Provided:  Patient instructed to cont prior HEP     Exercises were reviewed and Melissa was able to demonstrate them prior to the end of the session.  Melissa demonstrated good  understanding of the education provided.     See EMR under Media for exercises provided today and/or prior visit.        Assessment     Pt would continue to benefit from skilled OT. Pt has met most of her STGs and is making fair progress.    - Progress towards goals: See below for current status and updates..    Melissa is progressing well towards her goals . Pt continues with good rehab potential.     Pt will continue to benefit from skilled outpatient occupational therapy to address the deficits listed in the problem list on initial evaluation in order to maximize pt's level of independence in the home and community.     Anticipated barriers to occupational therapy: None    Pt's spiritual, cultural and educational needs considered and pt agreeable to plan of care and goals.    Goals:  Short Term Goals: (4 weeks = 2/27/2023 and/or 10th visit) unless otherwise noted below.  1. Pt will be independent with HEP in 2 visits. MET  2. Pt will report decreased pain to a 7/10 at worst in RUE with ADLs in order to increase function/use of UE. MET  3. Pt will increase AROM wrist extension by 10 degrees (to 80 degrees) to increase function for ADL/IADL. MET  4. Pt will increase AROM wrist flexion by 5 degrees (to 80 degrees) to increase function for ADL/IADL. MET  5  Pt will demo increased R hand  strength of 5# (to 26#) in order to increase function with grasp during ADL/IADL tasks (cooking utensils, tool use, carrying groceries, steering wheel, etc.)  Not met     New STGS to be met by current POC exp date of 3/13/2023  6. Pt will demo at least 30# max   strength R hand  7. Pt will report decreased pain to 1-2/10 with ADLs to allow for increased function/use of UE.     Long Term Goals: (by discharge)  1. Pt will report decreased pain to 1-2/10 with ADLs to allow for increased function/use of UE.   2. Pt will exhibit WNL  AROM of R Wrist to allow for Independent use of for all ADL/IADL tasks.  3. Pt will exhibit at least 30# / WFL  strength R hand to allow a firm grasp during ADL/IADL (cooking utensils, tool use, carrying groceries, steering wheel, etc.)  4. Pt will return to PLOF with all ADL/IADL, leisure and home mgmt tasks.    Plan   Continue Occupational Therapy 2-3 times per week through current [facility] expiration date of 3/13/2023, in order to to decrease pain and edema, and increase A/PROM, strength, and functional use of R upper extremity.    Updates/Grading for next session:  Progress with OT as tolerated.      KATALINA Salomon, JIMT

## 2023-03-01 NOTE — PROGRESS NOTES
Occupational Therapy Daily Treatment Note     Visit Date: 3/3/2023  Name: Melissa Casas  Clinic Number: [id number]    Therapy Diagnosis:   Encounter Diagnoses   Name Primary?    Pain in right wrist Yes    Right hand weakness     Impaired mobility and ADLs        Physician: Jules Hicks PA-C    Physician Orders:       Note     Patient has recently been experiencing flexor tendinitis of the right hand/wrist.  Patient requires occupational therapy to decrease the tenderness and increase the function of the right hand/wrist.       Duration:  6 weeks      Frequency:  2-3 times per week      Please work on range of motion, stretching, edema control, therapeutic modalities (such as TENS stimulation, thermal therapies, an ultrasound), and eventually gentle strengthening. Thanks!            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     Pain:  Functional Pain Scale Rating 0-10:    5/10 on average   3/10 at best   7/10 at worst  Location: R wrist   Description: Aching and Sharp,   Aggravating Factors: using more than 15-20 min  Easing Factors: brace use    Objective   MEASUREMENTS   Last measurements below are from Eval unless otherwise noted.    CMS Impairment/Limitation/Restriction for FOTO Wrist Survey     Therapist reviewed FOTO scores for Melissa Casas on 1/31/2023.   FOTO documents entered into [employer]  - see Media section.     Eval Limitation Score: 32%    6th visit / Reassessment  Limitation Score: 47%        Observation/Inspection/Wound/Incision/Scar: Decreased edema, skin intact other than some small closed areas of bruising petechia related to brace wear per pt report.     Sensation: Grossly WNL,     Edema:  2/22/2023          Circumferential (in cm) L R   Proximal Wrist Crease 16.1 16.0   MPs NT NT   Mid P1 of  Long Finger NT NT      AROM Hand: Tip to palm/DPC digits 1-5 (in cm)    Left Right   1st Intact 1-5  Intact 1-5    2nd       3rd       4th       5th           AROM Wrist/Forearm: 2/272023                Left              Right   Wrist Ext/Flex:  80/80° Ext/Flex:  80/80°           Forearm Pron/Sup  WNL° Pro/Sup:  WNL°      AROM Elbow:                 Left              Right   Elbow Ext/Flex  WNL° Ext/Flex:  WNL°         Manual Muscle Test: NT                                       and Pinch Strength (in pounds, psi's): 2/27/2023    Left Right    II 34 20/21           Lateral NT NT           Tripod  NT NT      Special and/or Standardized Tests:  NT       TREATMENT  Melissa received the following supervised modalities after being cleared for contradictions for 3 minutes:   Moist heat to R wrist/hand pre 3 min      Melissa received the following direct contact modalities after being cleared for contraindications for 8 minutes:  - Ultrasound: Continuous at 0.7 W/cm2 at 3 Mhz for 8 min at volar R wrist and forearm.    Melissa received the following manual therapy techniques for 0 minutes:   - STM and retrograde massage to volar wrist /hand (NT)    Melissa received therapeutic exercises for 10 minutes including:  - AAROM wrist flexion and ext through full ROM 3x10 each with 2# DB  - AROM UD 3x10 with 1# DB (NT)    Melissa participated in dynamic functional therapeutic activities to improve functional performance for 20 minutes, including:  - Wrist wheel forward and back 2 min each and neutral wrist x 2 min  -  Hand master 4x10 soft.  - Hand helper 5x5 with 1 blue band (NT)   - Isospheres 3 min  - Flexbar palm up/palm down wrist flex an ext 3x10 with yellow,  - Rolling over yellow flexbar x 2 min. (NT)  - Juxiciser 3 min    Home Exercises and Education Provided     Education provided:   -  Cont per previous     Written Home Exercises Provided:  Patient instructed to cont prior HEP     Exercises were reviewed and Melissa was able to demonstrate them prior to the end of the session.  Melissa demonstrated good  understanding of the education provided.     See EMR under Media for exercises provided today and/or prior visit.        Assessment     Pt would continue to benefit from skilled OT. Pt tolerated progression with Tx well this date. Cont per current POC.    - Progress towards goals: See below for current status and updates..    Melissa is progressing well towards her goals . Pt continues with good rehab potential.     Pt will continue to benefit from skilled outpatient occupational therapy to address the deficits listed in the problem list on initial evaluation in order to maximize pt's level of independence in the home and community.     Anticipated barriers to occupational therapy: None    Pt's spiritual, cultural and educational needs considered and pt agreeable to plan of care and goals.    Goals:  Short Term Goals: (4 weeks = 2/27/2023 and/or 10th visit) unless otherwise noted below.  1. Pt will be independent with HEP in 2 visits. MET  2. Pt will report decreased pain to a 7/10 at worst in RUE with ADLs in order to increase function/use of UE. MET  3. Pt will increase AROM wrist extension by 10 degrees (to 80 degrees) to increase function for ADL/IADL. MET  4. Pt will increase AROM wrist flexion by 5 degrees (to 80 degrees) to increase function for ADL/IADL. MET  5  Pt will demo increased R hand  strength of 5# (to 26#) in order to increase function with grasp during ADL/IADL tasks (cooking utensils, tool use,  carrying groceries, steering wheel, etc.)  Not met     New STGS to be met by current POC exp date of 3/13/2023  6. Pt will demo at least 30# max  strength R hand  7. Pt will report decreased pain to 1-2/10 with ADLs to allow for increased function/use of UE.     Long Term Goals: (by discharge)  1. Pt will report decreased pain to 1-2/10 with ADLs to allow for increased function/use of UE.   2. Pt will exhibit WNL  AROM of R Wrist to allow for Independent use of for all ADL/IADL tasks.  3. Pt will exhibit at least 30# / WFL  strength R hand to allow a firm grasp during ADL/IADL (cooking utensils, tool use, carrying groceries, steering wheel, etc.)  4. Pt will return to PLOF with all ADL/IADL, leisure and home mgmt tasks.    Plan   Continue Occupational Therapy 2-3 times per week through current poc expiration date of 3/13/2023, in order to to decrease pain and edema, and increase A/PROM, strength, and functional use of R upper extremity.    Updates/Grading for next session:  Progress with OT as tolerated.      KATALINA Salomon, JIMT

## 2023-03-03 ENCOUNTER — CLINICAL SUPPORT (OUTPATIENT)
Dept: REHABILITATION | Facility: HOSPITAL | Age: 68
End: 2023-03-03
Payer: MEDICARE

## 2023-03-03 DIAGNOSIS — M25.531 PAIN IN RIGHT WRIST: Primary | ICD-10-CM

## 2023-03-03 DIAGNOSIS — Z74.09 IMPAIRED MOBILITY AND ADLS: ICD-10-CM

## 2023-03-03 DIAGNOSIS — Z78.9 IMPAIRED MOBILITY AND ADLS: ICD-10-CM

## 2023-03-03 DIAGNOSIS — R29.898 RIGHT HAND WEAKNESS: ICD-10-CM

## 2023-03-03 PROCEDURE — 97530 THERAPEUTIC ACTIVITIES: CPT | Mod: PO

## 2023-03-03 PROCEDURE — 97110 THERAPEUTIC EXERCISES: CPT | Mod: PO

## 2023-03-03 PROCEDURE — 97035 APP MDLTY 1+ULTRASOUND EA 15: CPT | Mod: PO

## 2023-03-06 PROBLEM — E66.01 CLASS 3 SEVERE OBESITY WITH BODY MASS INDEX (BMI) OF 40.0 TO 44.9 IN ADULT: Status: ACTIVE | Noted: 2023-03-06

## 2023-03-06 PROBLEM — E66.813 CLASS 3 SEVERE OBESITY WITH BODY MASS INDEX (BMI) OF 40.0 TO 44.9 IN ADULT: Status: ACTIVE | Noted: 2023-03-06

## 2023-03-06 NOTE — PROGRESS NOTES
Occupational Therapy Daily Treatment Note     Visit Date: 3/7/2023  Name: Melissa Casas  Clinic Number: [id number]    Therapy Diagnosis:   Encounter Diagnoses   Name Primary?    Pain in right wrist Yes    Right hand weakness     Impaired mobility and ADLs        Physician: Jules Hicks PA-C    Physician Orders:       Note     Patient has recently been experiencing flexor tendinitis of the right hand/wrist.  Patient requires occupational therapy to decrease the tenderness and increase the function of the right hand/wrist.       Duration:  6 weeks      Frequency:  2-3 times per week      Please work on range of motion, stretching, edema control, therapeutic modalities (such as TENS stimulation, thermal therapies, an ultrasound), and eventually gentle strengthening. Thanks!            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 Pain is more frequent overall     Pain:  Functional Pain Scale Rating 0-10:    5/10 on average   3/10 at best   7/10 at worst  Location: R wrist   Description: Aching and Sharp,   Aggravating Factors: using more than 15-20 min  Easing Factors: brace use    Objective   MEASUREMENTS   Last measurements below are from Eval unless otherwise noted.    CMS Impairment/Limitation/Restriction for FOTO Wrist Survey     Therapist reviewed FOTO scores for Melissa Casas on 1/31/2023.    FOTO documents entered into [facility] - see Media section.     Eval Limitation Score: 32%    6th visit / Reassessment  Limitation Score: 47%        Observation/Inspection/Wound/Incision/Scar: Decreased edema, skin intact other than some small closed areas of bruising petechia related to brace wear per pt report.     Sensation: Grossly WNL,     Edema:  2/22/2023          Circumferential (in cm) L R   Proximal Wrist Crease 16.1 16.0   MPs NT NT   Mid P1 of  Long Finger NT NT      AROM Hand: Tip to palm/DPC digits 1-5 (in cm)    Left Right   1st Intact 1-5  Intact 1-5    2nd       3rd       4th       5th           AROM Wrist/Forearm: 2/272023                Left              Right   Wrist Ext/Flex:  80/80° Ext/Flex:  80/80°           Forearm Pron/Sup  WNL° Pro/Sup:  WNL°      AROM Elbow:                 Left              Right   Elbow Ext/Flex  WNL° Ext/Flex:  WNL°         Manual Muscle Test: NT                                       and Pinch Strength (in pounds, psi's): 2/27/2023    Left Right    II 34 20/21           Lateral NT NT           Tripod  NT NT      Special and/or Standardized Tests:  NT       TREATMENT  Melissa received the following supervised modalities after being cleared for contradictions for 3 minutes:   Moist heat to R wrist/hand pre 3 min  Ice Massage 3 min to R volar wrist    Melissa received the following direct contact modalities after being cleared for contraindications for 0  minutes:  - Ultrasound: Continuous at 0.7 W/cm2 at 3 Mhz for 8 min at volar R wrist and forearm.    Melissa received the following manual therapy techniques for 0 minutes:   - STM and retrograde massage to volar wrist /hand (NT)    Melissa received therapeutic exercises for 15 minutes including:  - AAROM wrist flexion and ext through full ROM 2x10 each with 2# DB  - Gentle AROM R wrist flexion and extension over bolster 2x10 palm up and 2x10 palm down.  - AROM UD 3x10 with 1# DB (NT)    Melissa participated in dynamic  functional therapeutic activities to improve functional performance for 30 minutes, including:  - Putty: grasp roll-outs and pinches 2-3x10 each with yellow putty  Also instructed home program for putty.  - Wrist wheel forward and back 2 min each and neutral wrist x 2 min (NT)  - Hand master 4x10 soft. (NT)  - Hand helper 5x5 with 1 blue band (NT)   - Isospheres 3 min (NT)  - Flexbar palm up/palm down wrist flex an ext 3x10 with yellow,  - Rolling over yellow flexbar x 2 min. (NT)  - Juxiciser 3 min (NT)    Home Exercises and Education Provided     Education provided:   -  Cont per previous. Add putty and ice massage    Written Home Exercises Provided:  Patient instructed to cont prior HEP, Add putty and ice massage    Exercises were reviewed and Melissa was able to demonstrate them prior to the end of the session.  Melissa demonstrated good  understanding of the education provided.     See EMR under Media for exercises provided today and/or prior visit.        Assessment     Pt would continue to benefit from skilled OT. Pt with some more frequent pain, ice massage added to tx regimen and putty ex for gentle strengthening as AROM of R wrist is now WNL. Cont per current POC.    - Progress towards goals: See below for current status and updates.    Melissa is progressing well towards her goals . Pt continues with good rehab potential.     Pt will continue to benefit from skilled outpatient occupational therapy to address the deficits listed in the problem list on initial evaluation in order to maximize pt's level of independence in the home and community.     Anticipated barriers to occupational therapy: None    Pt's spiritual, cultural and educational needs considered and pt agreeable to plan of care and goals.    Goals:  Short Term Goals: (4 weeks = 2/27/2023 and/or 10th visit) unless otherwise noted below.  1. Pt will be independent with HEP in 2 visits. MET  2. Pt will report decreased pain to a 7/10 at worst in RUE  with ADLs in order to increase function/use of UE. MET  3. Pt will increase AROM wrist extension by 10 degrees (to 80 degrees) to increase function for ADL/IADL. MET  4. Pt will increase AROM wrist flexion by 5 degrees (to 80 degrees) to increase function for ADL/IADL. MET  5  Pt will demo increased R hand  strength of 5# (to 26#) in order to increase function with grasp during ADL/IADL tasks (cooking utensils, tool use, carrying groceries, steering wheel, etc.)  Not met     New STGS to be met by current POC exp date of 3/13/2023  6. Pt will demo at least 30# max  strength R hand  7. Pt will report decreased pain to 1-2/10 with ADLs to allow for increased function/use of UE.     Long Term Goals: (by discharge)  1. Pt will report decreased pain to 1-2/10 with ADLs to allow for increased function/use of UE.   2. Pt will exhibit WNL  AROM of R Wrist to allow for Independent use of for all ADL/IADL tasks.  3. Pt will exhibit at least 30# / WFL  strength R hand to allow a firm grasp during ADL/IADL (cooking utensils, tool use, carrying groceries, steering wheel, etc.)  4. Pt will return to PLOF with all ADL/IADL, leisure and home mgmt tasks.    Plan   Continue Occupational Therapy 2-3 times per week through current poc expiration date of 3/13/2023, in order to to decrease pain and edema, and increase A/PROM, strength, and functional use of R upper extremity.    Updates/Grading for next session:  Progress with OT as tolerated.      KATALINA Salomon, JIMT

## 2023-03-07 ENCOUNTER — CLINICAL SUPPORT (OUTPATIENT)
Dept: REHABILITATION | Facility: HOSPITAL | Age: 68
End: 2023-03-07
Payer: MEDICARE

## 2023-03-07 DIAGNOSIS — Z78.9 IMPAIRED MOBILITY AND ADLS: ICD-10-CM

## 2023-03-07 DIAGNOSIS — Z74.09 IMPAIRED MOBILITY AND ADLS: ICD-10-CM

## 2023-03-07 DIAGNOSIS — M25.531 PAIN IN RIGHT WRIST: Primary | ICD-10-CM

## 2023-03-07 DIAGNOSIS — R29.898 RIGHT HAND WEAKNESS: ICD-10-CM

## 2023-03-07 PROCEDURE — 97530 THERAPEUTIC ACTIVITIES: CPT | Mod: PO

## 2023-03-07 PROCEDURE — 97110 THERAPEUTIC EXERCISES: CPT | Mod: PO

## 2023-03-08 ENCOUNTER — OFFICE VISIT (OUTPATIENT)
Dept: ORTHOPEDICS | Facility: CLINIC | Age: 68
End: 2023-03-08
Payer: MEDICARE

## 2023-03-08 VITALS — WEIGHT: 258 LBS | HEIGHT: 65 IN | BODY MASS INDEX: 42.99 KG/M2

## 2023-03-08 DIAGNOSIS — M77.8 RIGHT WRIST TENDINITIS: Primary | ICD-10-CM

## 2023-03-08 PROCEDURE — 99999 PR PBB SHADOW E&M-EST. PATIENT-LVL II: ICD-10-PCS | Mod: PBBFAC,,, | Performed by: ORTHOPAEDIC SURGERY

## 2023-03-08 PROCEDURE — 99213 PR OFFICE/OUTPT VISIT, EST, LEVL III, 20-29 MIN: ICD-10-PCS | Mod: S$PBB,,, | Performed by: ORTHOPAEDIC SURGERY

## 2023-03-08 PROCEDURE — 99212 OFFICE O/P EST SF 10 MIN: CPT | Mod: PBBFAC,PN | Performed by: ORTHOPAEDIC SURGERY

## 2023-03-08 PROCEDURE — 99999 PR PBB SHADOW E&M-EST. PATIENT-LVL II: CPT | Mod: PBBFAC,,, | Performed by: ORTHOPAEDIC SURGERY

## 2023-03-08 PROCEDURE — 99213 OFFICE O/P EST LOW 20 MIN: CPT | Mod: S$PBB,,, | Performed by: ORTHOPAEDIC SURGERY

## 2023-03-08 NOTE — PROGRESS NOTES
Ms. Casas returns to clinic today.  She has a history of synovitis of her wrists.  She has been working with therapy because she cannot take anti-inflammatories.  She states that the therapy has improved her wrist     Physical exam:  Examination of the right wrist and hand reveals that there is no edema.  There are no major skin changes.  Palpation produces only minimal tenderness over the flexor tendons.  She has sensation intact capillary refill less than 2 seconds     Assessment: Right wrist s tendinitis    Plan:    1. She can continue her last couple of therapy visits and transition to a home exercise program    2. She can continue to wear her neoprene brace as needed    3.  To follow up on a p.r.n. basis

## 2023-03-14 ENCOUNTER — CLINICAL SUPPORT (OUTPATIENT)
Dept: REHABILITATION | Facility: HOSPITAL | Age: 68
End: 2023-03-14
Payer: MEDICARE

## 2023-03-14 DIAGNOSIS — R29.898 RIGHT HAND WEAKNESS: ICD-10-CM

## 2023-03-14 DIAGNOSIS — Z78.9 IMPAIRED MOBILITY AND ADLS: ICD-10-CM

## 2023-03-14 DIAGNOSIS — M25.531 PAIN IN RIGHT WRIST: Primary | ICD-10-CM

## 2023-03-14 DIAGNOSIS — Z74.09 IMPAIRED MOBILITY AND ADLS: ICD-10-CM

## 2023-03-14 PROCEDURE — 97035 APP MDLTY 1+ULTRASOUND EA 15: CPT | Mod: PO

## 2023-03-14 PROCEDURE — 97530 THERAPEUTIC ACTIVITIES: CPT | Mod: PO

## 2023-03-14 NOTE — PROGRESS NOTES
Please see POC note for reassessment results updates and tx performed this date.    KATALINA Salomon/RANDY

## 2023-03-14 NOTE — PLAN OF CARE
Occupational Therapy Reassessment, Updated POC and Daily Treatment Note     Visit Date: 3/14/2023  Name: Melissa Casas  Clinic Number: [id number]    Therapy Diagnosis:   Encounter Diagnoses   Name Primary?    Pain in right wrist Yes    Right hand weakness     Impaired mobility and ADLs        Physician: Jules Hicks, LINK    Physician Orders:       Note     Patient has recently been experiencing flexor tendinitis of the right hand/wrist.  Patient requires occupational therapy to decrease the tenderness and increase the function of the right hand/wrist.       Duration:  6 weeks      Frequency:  2-3 times per week      Please work on range of motion, stretching, edema control, therapeutic modalities (such as TENS stimulation, thermal therapies, an ultrasound), and eventually gentle strengthening. Thanks!            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 Pain is more frequent overall     Pain:  Functional Pain Scale Rating 0-10:    5/10 on average   3/10 at best   7/10 at worst  Location: R wrist   Description: Aching and Sharp,   Aggravating Factors: using more than 15-20 min  Easing Factors: brace use    Objective   MEASUREMENTS   Last measurements below are from Eval unless otherwise noted.    CMS Impairment/Limitation/Restriction for FOTO Wrist Survey     Therapist reviewed FOTO  scores for Melissa Augusto on 1/31/2023.   FOTO documents entered into [employer] - see Media section.     Eval Limitation Score: 32%    6th visit / Reassessment  Limitation Score: 47%        Observation/Inspection/Wound/Incision/Scar: Decreased edema, skin intact other than some small closed areas of bruising petechia related to brace wear per pt report.     Sensation: Grossly WNL,     Edema:  2/22/2023          Circumferential (in cm) L R   Proximal Wrist Crease 16.1 16.0   MPs NT NT   Mid P1 of  Long Finger NT NT      AROM Hand: Tip to palm/DPC digits 1-5 (in cm)    Left Right   1st Intact 1-5  Intact 1-5    2nd       3rd       4th       5th           AROM Wrist/Forearm: 2/272023                Left              Right   Wrist Ext/Flex:  80/80° Ext/Flex:  80/80°           Forearm Pron/Sup  WNL° Pro/Sup:  WNL°      AROM Elbow:                 Left              Right   Elbow Ext/Flex  WNL° Ext/Flex:  WNL°         Manual Muscle Test: NT                                       and Pinch Strength (in pounds, psi's): 2/27/2023    Left Right    II 34 20/21           Lateral NT NT           Tripod  NT NT      Special and/or Standardized Tests:  NT       TREATMENT  Melissa received the following supervised modalities after being cleared for contradictions for 3 minutes:   Moist heat to R wrist/hand pre 3 min      Melissa received the following direct contact modalities after being cleared for contraindications for 0  minutes:  - Ultrasound: Continuous at 0.7 W/cm2 at 3 Mhz for 8 min at volar R wrist and forearm.    Melissa received the following manual therapy techniques for 0 minutes:   - STM and retrograde massage to volar wrist /hand (NT)    Melissa received therapeutic exercises for 45 minutes including:  - AAROM wrist flexion and ext through full ROM 2x10 each with 2# DB  - Gentle AROM R wrist flexion and extension over bolster 2x10 palm up and 2x10 palm down. (NT)  - AROM UD 3x10 with 1# DB (NT)    Melissa participated in  dynamic functional therapeutic activities to improve functional performance for 30 minutes, including:  - Wrist wheel forward and back 3 min each and neutral wrist x 3 min  - Hand master 5x10 soft.   - Hand helper 5x5 with 1 green and 1 red band.  - Isospheres 3 min (NT)  - Flexbar palm up/palm down wrist flex an ext 3x10 with yellow,  - Rolling over yellow flexbar x 2 min. (NT)  - Juxiciser 3 min (NT)    Home Exercises and Education Provided     Education provided:   -  Cont per previous.     Written Home Exercises Provided:  Patient instructed to cont prior HEP.    Exercises were reviewed and Melissa was able to demonstrate them prior to the end of the session.  Melissa demonstrated good  understanding of the education provided.     See EMR under Media for exercises provided today and/or prior visit.        Assessment     Pt would continue to benefit from skilled OT.     - Progress towards goals: See below for current status and updates.    Melissa is progressing well towards her goals . Pt continues with good rehab potential.     Pt will continue to benefit from skilled outpatient occupational therapy to address the deficits listed in the problem list on initial evaluation in order to maximize pt's level of independence in the home and community.     Anticipated barriers to occupational therapy: None    Pt's spiritual, cultural and educational needs considered and pt agreeable to plan of care and goals.    Goals:  Short Term Goals: (4 weeks = 2/27/2023 and/or 10th visit) unless otherwise noted below.  1. Pt will be independent with HEP in 2 visits. MET  2. Pt will report decreased pain to a 7/10 at worst in RUE with ADLs in order to increase function/use of UE. MET  3. Pt will increase AROM wrist extension by 10 degrees (to 80 degrees) to increase function for ADL/IADL. MET  4. Pt will increase AROM wrist flexion by 5 degrees (to 80 degrees) to increase function for ADL/IADL. MET  5  Pt will demo increased R hand   strength of 5# (to 26#) in order to increase function with grasp during ADL/IADL tasks (cooking utensils, tool use, carrying groceries, steering wheel, etc.)  Not met     New STGS to be met by updated/extended POC exp date of 4/13/2023  6. Pt will demo at least 30# max  strength R hand Progressing  7. Pt will report decreased pain to 1-2/10 with ADLs to allow for increased function/use of UE. Progressing    Long Term Goals: (by discharge)  1. Pt will report decreased pain to 1-2/10 with ADLs to allow for increased function/use of UE.   2. Pt will exhibit WNL  AROM of R Wrist to allow for Independent use of for all ADL/IADL tasks.  3. Pt will exhibit at least 30# / WFL  strength R hand to allow a firm grasp during ADL/IADL (cooking utensils, tool use, carrying groceries, steering wheel, etc.)  4. Pt will return to PLOF with all ADL/IADL, leisure and home mgmt tasks.    Plan   Continue Occupational Therapy  1-2 times per week through  updated poc ext date of 30 days form 3/14/2023 = 4/13/2023 in order to to decrease pain and edema, and increase A/PROM, strength, and functional use of R upper extremity.    Updates/Grading for next session:  Progress with OT as tolerated.      KATALINA Salomon, JIMT

## 2023-03-21 ENCOUNTER — CLINICAL SUPPORT (OUTPATIENT)
Dept: REHABILITATION | Facility: HOSPITAL | Age: 68
End: 2023-03-21
Payer: MEDICARE

## 2023-03-21 DIAGNOSIS — M25.531 PAIN IN RIGHT WRIST: Primary | ICD-10-CM

## 2023-03-21 DIAGNOSIS — Z78.9 IMPAIRED MOBILITY AND ADLS: ICD-10-CM

## 2023-03-21 DIAGNOSIS — R29.898 RIGHT HAND WEAKNESS: ICD-10-CM

## 2023-03-21 DIAGNOSIS — Z74.09 IMPAIRED MOBILITY AND ADLS: ICD-10-CM

## 2023-03-21 PROCEDURE — 97110 THERAPEUTIC EXERCISES: CPT | Mod: PO

## 2023-03-21 PROCEDURE — 97035 APP MDLTY 1+ULTRASOUND EA 15: CPT | Mod: PO

## 2023-03-21 PROCEDURE — 97530 THERAPEUTIC ACTIVITIES: CPT | Mod: PO

## 2023-03-21 NOTE — PROGRESS NOTES
Occupational Therapy Daily Treatment Note     Visit Date: 3/21/2023  Name: Melissa Casas  Clinic Number: [id number]    Therapy Diagnosis:   Encounter Diagnoses   Name Primary?    Pain in right wrist Yes    Right hand weakness     Impaired mobility and ADLs      Physician: Jules Hicks PA-C    Note     Patient has recently been experiencing flexor tendinitis of the right hand/wrist.  Patient requires occupational therapy to decrease the tenderness and increase the function of the right hand/wrist.       Duration:  6 weeks      Frequency:  2-3 times per week      Please work on range of motion, stretching, edema control, therapeutic modalities (such as TENS stimulation, thermal therapies, an ultrasound), and eventually gentle strengthening. Thanks!            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     Pain:  Functional Pain Scale Rating 0-10:    4-5/10 on average   2/10 at best   6/10 at worst  Location: R wrist   Description: Aching and Sharp,   Aggravating Factors: using more than 15-20 min  Easing Factors: brace use     Objective   MEASUREMENTS   Last measurements below are from Eval unless otherwise noted.     CMS Impairment/Limitation/Restriction for FOTO Wrist Survey     Therapist reviewed FOTO scores for Melissa Casas on 1/31/2023.   FOTO documents entered into [id number] - see Media section.     Eval Limitation Score: 32%      6th visit / Reassessment  Limitation Score: 47%        Observation/Inspection/Wound/Incision/Scar: Decreased edema, skin intact other than some small closed areas of bruising petechia related to brace wear per pt report.     Sensation: Grossly WNL,     Edema:  2/22/2023          Circumferential (in cm) L R   Proximal Wrist Crease 16.1 16.0   MPs NT NT   Mid P1 of  Long Finger NT NT      AROM Hand: Tip to palm/DPC digits 1-5 (in cm)    Left Right   1st Intact 1-5  Intact 1-5    2nd       3rd       4th       5th             AROM Wrist/Forearm: 2/272023                Left              Right   Wrist Ext/Flex:  80/80° Ext/Flex:  80/80°           Forearm Pron/Sup  WNL° Pro/Sup:  WNL°      AROM Elbow:                 Left              Right   Elbow Ext/Flex  WNL° Ext/Flex:  WNL°         Manual Muscle Test: NT                                       and Pinch Strength (in pounds, psi's): 2/27/2023    Left Right    II 34 20/21           Lateral NT NT           Tripod  NT NT      Special and/or Standardized Tests:  NT        TREATMENT  Melissa received the following supervised modalities after being cleared for contradictions for 3 minutes:   Moist heat to R wrist/hand pre 3 min      Melissa received the following direct contact modalities after being cleared for contraindications for 0  minutes:  - Ultrasound: Continuous at 0.7 W/cm2 at 3 Mhz for 8 min at volar R wrist and forearm.     Melissa received the following manual therapy techniques for 0 minutes:   - STM and retrograde massage to volar wrist /hand (NT)     Melissa received therapeutic exercises for 15 minutes including:  - AAROM wrist flexion and ext through full ROM 2x10 each with 2# DB  - Gentle AROM R wrist flexion and extension x10 with 10sec holds using 1# DB  - AROM UD 3x10 with 1# DB (NT)     Melissa participated in dynamic functional therapeutic activities to improve functional performance for 20 minutes, including:  - Wrist wheel forward and back 3 min each  and neutral wrist x 3 min  - Hand master 5x10 soft. (NT)  - Clothespin yellow 4x10 lateral and tripod 4x10.  - Hand helper 4x4 with 1 blue and 1 yellow.  - Isospheres 3 min (NT)  - Flexbar palm up/palm down wrist flex and ext 3x10 with yellow (NT)  - Rolling over yellow flexbar x 2 min. (NT)  - Juxiciser 5 sets.     Home Exercises and Education Provided      Education provided:   -  Cont per previous.      Written Home Exercises Provided:  Patient instructed to cont prior HEP.     Exercises were reviewed and Melissa was able to demonstrate them prior to the end of the session.  Melissa demonstrated good  understanding of the education provided.      See EMR under Media for exercises provided today and/or prior visit.         Assessment      Pt would continue to benefit from skilled OT. Pt with some decreased pain overall; cont per current poc     - Progress towards goals: See below for current status and updates.     Melissa is progressing well towards her goals . Pt continues with good rehab potential.      Pt will continue to benefit from skilled outpatient occupational therapy to address the deficits listed in the problem list on initial evaluation in order to maximize pt's level of independence in the home and community.      Anticipated barriers to occupational therapy: None     Pt's spiritual, cultural and educational needs considered and pt agreeable to plan of care and goals.     Goals:  Short Term Goals: (4 weeks = 2/27/2023 and/or 10th visit) unless otherwise noted below.  1. Pt will be independent with HEP in 2 visits. MET  2. Pt will report decreased pain to a 7/10 at worst in RUE with ADLs in order to increase function/use of UE. MET  3. Pt will increase AROM wrist extension by 10 degrees (to 80 degrees) to increase function for ADL/IADL. MET  4. Pt will increase AROM wrist flexion by 5 degrees (to 80 degrees) to increase function for ADL/IADL. MET  5  Pt will demo increased R hand  strength of 5# (to  26#) in order to increase function with grasp during ADL/IADL tasks (cooking utensils, tool use, carrying groceries, steering wheel, etc.)  Not met      New STGS to be met by updated/extended POC exp date of 4/13/2023  6. Pt will demo at least 30# max  strength R hand Progressing  7. Pt will report decreased pain to 1-2/10 with ADLs to allow for increased function/use of UE. Progressing     Long Term Goals: (by discharge)  1. Pt will report decreased pain to 1-2/10 with ADLs to allow for increased function/use of UE.   2. Pt will exhibit WNL  AROM of R Wrist to allow for Independent use of for all ADL/IADL tasks.  3. Pt will exhibit at least 30# / WFL  strength R hand to allow a firm grasp during ADL/IADL (cooking utensils, tool use, carrying groceries, steering wheel, etc.)  4. Pt will return to PLOF with all ADL/IADL, leisure and home mgmt tasks.     Plan   Continue Occupational Therapy  1-2 times per week through  updated poc ext date of 30 days form 3/14/2023 = 4/13/2023 in order to  decrease pain and edema, and increase A/PROM, strength, and functional use of R upper extremity.     Updates/Grading for next session:  Progress with OT as tolerated.        KATALINA Salomon, JIMT

## 2023-03-28 ENCOUNTER — CLINICAL SUPPORT (OUTPATIENT)
Dept: REHABILITATION | Facility: HOSPITAL | Age: 68
End: 2023-03-28
Payer: MEDICARE

## 2023-03-28 DIAGNOSIS — Z78.9 IMPAIRED MOBILITY AND ADLS: ICD-10-CM

## 2023-03-28 DIAGNOSIS — R29.898 RIGHT HAND WEAKNESS: ICD-10-CM

## 2023-03-28 DIAGNOSIS — Z74.09 IMPAIRED MOBILITY AND ADLS: ICD-10-CM

## 2023-03-28 DIAGNOSIS — M25.531 PAIN IN RIGHT WRIST: Primary | ICD-10-CM

## 2023-03-28 PROCEDURE — 97530 THERAPEUTIC ACTIVITIES: CPT | Mod: PO

## 2023-03-28 PROCEDURE — 97035 APP MDLTY 1+ULTRASOUND EA 15: CPT | Mod: PO

## 2023-03-28 NOTE — PROGRESS NOTES
Occupational Therapy Daily Treatment Note     Visit Date: 3/28/2023  Name: Melissa Casas  Clinic Number: [id number]    Therapy Diagnosis:   Encounter Diagnoses   Name Primary?    Pain in right wrist Yes    Right hand weakness     Impaired mobility and ADLs      Physician: Jules Hicks PA-C    Note     Patient has recently been experiencing flexor tendinitis of the right hand/wrist.  Patient requires occupational therapy to decrease the tenderness and increase the function of the right hand/wrist.       Duration:  6 weeks      Frequency:  2-3 times per week      Please work on range of motion, stretching, edema control, therapeutic modalities (such as TENS stimulation, thermal therapies, an ultrasound), and eventually gentle strengthening. Thanks!            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     Pain:  Functional Pain Scale Rating 0-10:    3-4/10 on average   2/10 at best   6/10 at worst  Location: R wrist   Description: Aching and Sharp,   Aggravating Factors: using more than 15-20 min  Easing Factors: brace use     Objective   MEASUREMENTS   Last measurements below are from Eval unless otherwise noted.     CMS Impairment/Limitation/Restriction for FOTO Wrist Survey     Therapist reviewed FOTO scores for Melissa Casas on 1/31/2023.   FOTO documents entered into [employer] - see Media section.     Eval Limitation Score: 32%      6th visit / Reassessment  Limitation Score: 47%        Observation/Inspection/Wound/Incision/Scar: Decreased edema, skin intact other than some small closed areas of bruising petechia related to brace wear per pt report.     Sensation: Grossly WNL,     Edema:  2/22/2023          Circumferential (in cm) L R   Proximal Wrist Crease 16.1 16.0   MPs NT NT   Mid P1 of  Long Finger NT NT      AROM Hand: Tip to palm/DPC digits 1-5 (in cm)    Left Right   1st Intact 1-5  Intact 1-5    2nd       3rd       4th       5th             AROM Wrist/Forearm: 2/272023                Left              Right   Wrist Ext/Flex:  80/80° Ext/Flex:  80/80°           Forearm Pron/Sup  WNL° Pro/Sup:  WNL°      AROM Elbow:                 Left              Right   Elbow Ext/Flex  WNL° Ext/Flex:  WNL°         Manual Muscle Test: NT                                       and Pinch Strength (in pounds, psi's): 2/27/2023    Left Right    II 34 20/21           Lateral NT NT           Tripod  NT NT      Special and/or Standardized Tests:  NT        TREATMENT  Melissa received the following supervised modalities after being cleared for contradictions for NT      Melissa received the following direct contact modalities after being cleared for contraindications for 0  minutes:  - Ultrasound: Continuous at 0.7 W/cm2 at 3 Mhz for 8 min at volar R wrist and forearm.     Melissa received the following manual therapy techniques for 0 minutes:   - STM and retrograde massage to volar wrist /hand (NT)     Melissa received therapeutic exercises for 0 minutes including:  - AAROM wrist flexion and ext through full ROM 2x10 each with 2# DB (NT)  - Gentle AROM R wrist flexion and extension x10 with 10sec holds using 1# DB (NT)  - AROM UD 3x10 with 1# DB (NT)     Melissa participated in dynamic functional therapeutic activities to improve functional performance for 30 minutes, including:  - Wrist wheel forward and back 3 min each and neutral wrist x 3 min  - Hand  master 5x10 soft. (NT)  - Clothespin yellow 4x10 lateral and tripod 4x10. (NT)  - Hand helper 5x10 2 yellow and 2 red bands..  - Isospheres 3 min   - Rolling over yellow flexbar 2 min  - Flexbar palm up/palm down wrist flex and ext 3x10 with yellow  - Juxiciser 5 sets.     Home Exercises and Education Provided      Education provided:   -  Cont per previous.      Written Home Exercises Provided:  Patient instructed to cont prior HEP.     Exercises were reviewed and Melissa was able to demonstrate them prior to the end of the session.  Melissa demonstrated good  understanding of the education provided.      See EMR under Media for exercises provided today and/or prior visit.         Assessment      Pt would continue to benefit from skilled OT. Pt with some decreased pain on avg. Cont per current poc     - Progress towards goals: See below for current status and updates.     Melissa is progressing well towards her goals . Pt continues with good rehab potential.      Pt will continue to benefit from skilled outpatient occupational therapy to address the deficits listed in the problem list on initial evaluation in order to maximize pt's level of independence in the home and community.      Anticipated barriers to occupational therapy: None     Pt's spiritual, cultural and educational needs considered and pt agreeable to plan of care and goals.     Goals:  Short Term Goals: (4 weeks = 2/27/2023 and/or 10th visit) unless otherwise noted below.  1. Pt will be independent with HEP in 2 visits. MET  2. Pt will report decreased pain to a 7/10 at worst in RUE with ADLs in order to increase function/use of UE. MET  3. Pt will increase AROM wrist extension by 10 degrees (to 80 degrees) to increase function for ADL/IADL. MET  4. Pt will increase AROM wrist flexion by 5 degrees (to 80 degrees) to increase function for ADL/IADL. MET  5  Pt will demo increased R hand  strength of 5# (to 26#) in order to increase function with grasp  during ADL/IADL tasks (cooking utensils, tool use, carrying groceries, steering wheel, etc.)  Not met      New STGS to be met by updated/extended POC exp date of 4/13/2023  6. Pt will demo at least 30# max  strength R hand Progressing  7. Pt will report decreased pain to 1-2/10 with ADLs to allow for increased function/use of UE. Progressing     Long Term Goals: (by discharge)  1. Pt will report decreased pain to 1-2/10 with ADLs to allow for increased function/use of UE.   2. Pt will exhibit WNL  AROM of R Wrist to allow for Independent use of for all ADL/IADL tasks.  3. Pt will exhibit at least 30# / WFL  strength R hand to allow a firm grasp during ADL/IADL (cooking utensils, tool use, carrying groceries, steering wheel, etc.)  4. Pt will return to PLOF with all ADL/IADL, leisure and home mgmt tasks.     Plan   Continue Occupational Therapy  1-2 times per week through updated poc ext date of 30 days form 3/14/2023 = 4/13/2023 in order to  decrease pain and edema, and increase A/PROM, strength, and functional use of R upper extremity.     Updates/Grading for next session:  Progress with OT as tolerated.        KATALINA Salomon, JIMT

## 2023-04-04 ENCOUNTER — CLINICAL SUPPORT (OUTPATIENT)
Dept: REHABILITATION | Facility: HOSPITAL | Age: 68
End: 2023-04-04
Payer: MEDICARE

## 2023-04-04 DIAGNOSIS — Z74.09 IMPAIRED MOBILITY AND ADLS: ICD-10-CM

## 2023-04-04 DIAGNOSIS — Z78.9 IMPAIRED MOBILITY AND ADLS: ICD-10-CM

## 2023-04-04 DIAGNOSIS — R29.898 RIGHT HAND WEAKNESS: ICD-10-CM

## 2023-04-04 DIAGNOSIS — M25.531 PAIN IN RIGHT WRIST: Primary | ICD-10-CM

## 2023-04-04 PROCEDURE — 97530 THERAPEUTIC ACTIVITIES: CPT | Mod: PO

## 2023-04-04 PROCEDURE — 97035 APP MDLTY 1+ULTRASOUND EA 15: CPT | Mod: PO

## 2023-04-04 NOTE — PROGRESS NOTES
Occupational Therapy Daily Treatment Note     Visit Date: 4/4/2023  Name: Melissa Casas  Clinic Number: [id number]    Therapy Diagnosis:   Encounter Diagnoses   Name Primary?    Pain in right wrist Yes    Right hand weakness     Impaired mobility and ADLs      Physician: Jules Hicks PA-C    Note     Patient has recently been experiencing flexor tendinitis of the right hand/wrist.  Patient requires occupational therapy to decrease the tenderness and increase the function of the right hand/wrist.       Duration:  6 weeks      Frequency:  2-3 times per week      Please work on range of motion, stretching, edema control, therapeutic modalities (such as TENS stimulation, thermal therapies, an ultrasound), and eventually gentle strengthening. Thanks!            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     Pain:  Functional Pain Scale Rating 0-10:    3-4/10 on average   2/10 at best   6/10 at worst  Location: R wrist   Description: Aching and Sharp,   Aggravating Factors: using more than 15-20 min  Easing Factors: brace use     Objective   MEASUREMENTS   Last measurements below are from Eval unless otherwise noted.     CMS Impairment/Limitation/Restriction for FOTO Wrist Survey     Therapist reviewed FOTO scores for Melissa Casas on 1/31/2023.   FOTO documents entered into [employer] - see Media section.     Eval Limitation Score: 32%      6th visit / Reassessment  Limitation Score: 47%        Observation/Inspection/Wound/Incision/Scar: Decreased edema, skin intact other than some small closed areas of bruising petechia related to brace wear per pt report.     Sensation: Grossly WNL,     Edema:  2/22/2023          Circumferential (in cm) L R   Proximal Wrist Crease 16.1 16.0   MPs NT NT   Mid P1 of  Long Finger NT NT      AROM Hand: Tip to palm/DPC digits 1-5 (in cm)    Left Right   1st Intact 1-5  Intact 1-5    2nd       3rd       4th       5th             AROM Wrist/Forearm: 2/272023                Left              Right   Wrist Ext/Flex:  80/80° Ext/Flex:  80/80°           Forearm Pron/Sup  WNL° Pro/Sup:  WNL°      AROM Elbow:                 Left              Right   Elbow Ext/Flex  WNL° Ext/Flex:  WNL°         Manual Muscle Test: NT                                       and Pinch Strength (in pounds, psi's): 2/27/2023    Left Right    II 34 20/21           Lateral NT NT           Tripod  NT NT      Special and/or Standardized Tests:  NT        TREATMENT  Melissa received the following supervised modalities after being cleared for contradictions for moist heat 3 min to R wrist.      Melissa received the following direct contact modalities after being cleared for contraindications for 8  minutes:  - Ultrasound: Continuous at 0.7 W/cm2 at 3 Mhz for 8 min at volar R wrist and forearm.     Melissa received the following manual therapy techniques for 0 minutes:   - STM and retrograde massage to volar wrist /hand (NT)     Melissa received therapeutic exercises for 0 minutes including:  - AAROM wrist flexion and ext through full ROM 2x10 each with 2# DB (NT)  - Gentle AROM R wrist flexion and extension x10 with 10sec holds using 1# DB (NT)  - AROM UD 3x10 with 1# DB (NT)     Melissa participated in dynamic functional therapeutic activities to improve functional performance for 30 minutes, including:  - Wrist wheel forward and back 3 min each and neutral  wrist x 3 min (NT)  - PHG level 1 black spring 5x5  - Clothespin yellow 4x10 lateral and tripod 4x10. (NT)  - Handmaster 4x10 open and close  - Flexbar palm up/palm down wrist flex and ext 3x10 with yellow  - Juxiciser 5 sets.     Home Exercises and Education Provided      Education provided:   -  Cont per previous.      Written Home Exercises Provided:  Patient instructed to cont prior HEP.     Exercises were reviewed and Melissa was able to demonstrate them prior to the end of the session.  Melissa demonstrated good  understanding of the education provided.      See EMR under Media for exercises provided today and/or prior visit.         Assessment      Pt would continue to benefit from skilled OT.  Pt tolerated progression with Tx well this date. Cont per current POC.     - Progress towards goals: See below for current status and updates.     Melissa is progressing well towards her goals . Pt continues with good rehab potential.      Pt will continue to benefit from skilled outpatient occupational therapy to address the deficits listed in the problem list on initial evaluation in order to maximize pt's level of independence in the home and community.      Anticipated barriers to occupational therapy: None     Pt's spiritual, cultural and educational needs considered and pt agreeable to plan of care and goals.     Goals:  Short Term Goals: (4 weeks = 2/27/2023 and/or 10th visit) unless otherwise noted below.  1. Pt will be independent with HEP in 2 visits. MET  2. Pt will report decreased pain to a 7/10 at worst in RUE with ADLs in order to increase function/use of UE. MET  3. Pt will increase AROM wrist extension by 10 degrees (to 80 degrees) to increase function for ADL/IADL. MET  4. Pt will increase AROM wrist flexion by 5 degrees (to 80 degrees) to increase function for ADL/IADL. MET  5  Pt will demo increased R hand  strength of 5# (to 26#) in order to increase function with grasp during ADL/IADL tasks (cooking  utensils, tool use, carrying groceries, steering wheel, etc.)  Not met      New STGS to be met by updated/extended POC exp date of 4/13/2023  6. Pt will demo at least 30# max  strength R hand Progressing  7. Pt will report decreased pain to 1-2/10 with ADLs to allow for increased function/use of UE. Progressing     Long Term Goals: (by discharge)  1. Pt will report decreased pain to 1-2/10 with ADLs to allow for increased function/use of UE.   2. Pt will exhibit WNL  AROM of R Wrist to allow for Independent use of for all ADL/IADL tasks.  3. Pt will exhibit at least 30# / WFL  strength R hand to allow a firm grasp during ADL/IADL (cooking utensils, tool use, carrying groceries, steering wheel, etc.)  4. Pt will return to PLOF with all ADL/IADL, leisure and home mgmt tasks.     Plan   Continue Occupational Therapy  1-2 times per week through updated poc ext date of 30 days form 3/14/2023 = 4/13/2023 in order to  decrease pain and edema, and increase A/PROM, strength, and functional use of R upper extremity.     Updates/Grading for next session:  Progress with OT as tolerated.        KATALINA Salomon, JIMT

## 2023-04-12 ENCOUNTER — CLINICAL SUPPORT (OUTPATIENT)
Dept: REHABILITATION | Facility: HOSPITAL | Age: 68
End: 2023-04-12
Payer: MEDICARE

## 2023-04-12 DIAGNOSIS — Z74.09 IMPAIRED MOBILITY AND ADLS: ICD-10-CM

## 2023-04-12 DIAGNOSIS — M25.531 PAIN IN RIGHT WRIST: Primary | ICD-10-CM

## 2023-04-12 DIAGNOSIS — R29.898 RIGHT HAND WEAKNESS: ICD-10-CM

## 2023-04-12 DIAGNOSIS — Z78.9 IMPAIRED MOBILITY AND ADLS: ICD-10-CM

## 2023-04-12 PROCEDURE — 97530 THERAPEUTIC ACTIVITIES: CPT | Mod: PO

## 2023-04-12 NOTE — PROGRESS NOTES
Occupational Therapy D/C and  Daily Treatment Note     Visit Date: 4/12/2023  Name: Melissa Casas  Clinic Number: [id number]    Therapy Diagnosis:   Encounter Diagnoses   Name Primary?    Pain in right wrist Yes    Right hand weakness     Impaired mobility and ADLs        Physician: Jules Hicks, LINK    Note     Patient has recently been experiencing flexor tendinitis of the right hand/wrist.  Patient requires occupational therapy to decrease the tenderness and increase the function of the right hand/wrist.       Duration:  6 weeks      Frequency:  2-3 times per week      Please work on range of motion, stretching, edema control, therapeutic modalities (such as TENS stimulation, thermal therapies, an ultrasound), and eventually gentle strengthening. Thanks!            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     CMS Impairment/Limitation/Restriction for FOTO Wrist Survey     Therapist reviewed FOTO scores for Melissa Casas on 1/31/2023.   FOTO documents entered into [employer] -  see Media section.     Eval Limitation Score: 32%     6th visit / Reassessment  Limitation Score: 47%    FOTO at D/C = 54.52%        Observation/Inspection/Wound/Incision/Scar: Decreased edema, skin intact other than some small closed areas of bruising petechia related to brace wear per pt report.     Sensation: Grossly WNL,     Edema:  2/22/2023          Circumferential (in cm) L R   Proximal Wrist Crease 16.1 16.0   MPs NT NT   Mid P1 of  Long Finger NT NT      AROM Hand: Tip to palm/DPC digits 1-5 (in cm)    Left Right   1st Intact 1-5  Intact 1-5    2nd       3rd       4th       5th             AROM Wrist/Forearm: 2/272023                Left              Right   Wrist Ext/Flex:  80/80° Ext/Flex:  80/80°           Forearm Pron/Sup  WNL° Pro/Sup:  WNL°      AROM Elbow:                 Left              Right   Elbow Ext/Flex  WNL° Ext/Flex:  WNL°         Manual Muscle Test: NT                                       and Pinch Strength (in pounds, psi's): 4/12/2023    Left Right    II 34 25           Lateral NT NT           Tripod  NT NT      Special and/or Standardized Tests:  NT        TREATMENT  Melissa received the following supervised modalities after being cleared for contradictions for moist heat 0 min to R wrist. (NT)      Melissa received the following direct contact modalities after being cleared for contraindications for 0  minutes:  - Ultrasound: Continuous at 0.7 W/cm2 at 3 Mhz for 8 min at volar R wrist and forearm. (NT)     Melissa received the following manual therapy techniques for 0 minutes:   - STM and retrograde massage to volar wrist /hand (NT)     Melissa received therapeutic exercises for 0 minutes including:  - AAROM wrist flexion and ext through full ROM 2x10 each with 2# DB (NT)  - Gentle AROM R wrist flexion and extension x10 with 10sec holds using 1# DB (NT)  - AROM UD 3x10 with 1# DB (NT)     Melissa participated in dynamic functional therapeutic activities to improve functional  performance for 30 minutes, including:  - Flexbar palm up/palm down wrist flex and ext 3x10 with yellow  - Red putty grasp, rolls and pinches 3x10 each  - Hand helper 4x10 2 yellow and 1 red band.     Home Exercises and Education Provided      Education provided:   -  Cont per previous.      Written Home Exercises Provided:  Patient instructed to cont prior HEP.     Exercises were reviewed and Melissa was able to demonstrate them prior to the end of the session.  Melissa demonstrated good  understanding of the education provided.      See EMR under Media for exercises provided today and/or prior visit.         Assessment       Pt has progressed well and has met nearly all goals is pleased with progress, and is ready for D/C at this time.       Goals:  Short Term Goals: (4 weeks = 2/27/2023 and/or 10th visit) unless otherwise noted below.  1. Pt will be independent with HEP in 2 visits. MET  2. Pt will report decreased pain to a 7/10 at worst in RUE with ADLs in order to increase function/use of UE. MET  3. Pt will increase AROM wrist extension by 10 degrees (to 80 degrees) to increase function for ADL/IADL. MET  4. Pt will increase AROM wrist flexion by 5 degrees (to 80 degrees) to increase function for ADL/IADL. MET  5  Pt will demo increased R hand  strength of 5# (to 26#) in order to increase function with grasp during ADL/IADL tasks (cooking utensils, tool use, carrying groceries, steering wheel, etc.)  Not met      New STGS to be met by updated/extended POC exp date of 4/13/2023  6. Pt will demo at least 30# max  strength R hand. Not MET: Progresed to 25#  7. Pt will report decreased pain to 1-2/10 with ADLs to allow for increased function/use of UE. MET     Long Term Goals: (by discharge)  1. Pt will report decreased pain to 1-2/10 with ADLs to allow for increased function/use of UE. MET  2. Pt will exhibit WNL  AROM of R Wrist to allow for Independent use of for all ADL/IADL tasks. MET  3. Pt will exhibit  at least 30# / WFL  strength R hand to allow a firm grasp during ADL/IADL (cooking utensils, tool use, carrying groceries, steering wheel, etc.) Not MET: Progresed to 25#  4. Pt will return to PLOF with all ADL/IADL, leisure and home mgmt tasks. MET for most activities     Plan   Pt for D/C from OT services this date. To to cont HEP as tolerated and will follow up with referring provider for any further tx needs.       KATALINA Salomon, JIMT

## 2023-09-13 ENCOUNTER — HOSPITAL ENCOUNTER (OUTPATIENT)
Dept: RADIOLOGY | Facility: HOSPITAL | Age: 68
Discharge: HOME | End: 2023-09-13
Attending: ORTHOPAEDIC SURGERY
Payer: MEDICARE

## 2023-09-13 ENCOUNTER — OFFICE VISIT (OUTPATIENT)
Dept: ORTHOPEDICS | Facility: CLINIC | Age: 68
End: 2023-09-13
Payer: MEDICARE

## 2023-09-13 DIAGNOSIS — M19.049 ARTHRITIS OF HAND: ICD-10-CM

## 2023-09-13 DIAGNOSIS — M18.0 PRIMARY ARTHROSIS OF FIRST CARPOMETACARPAL JOINTS, BILATERAL: ICD-10-CM

## 2023-09-13 DIAGNOSIS — M19.049 ARTHRITIS OF HAND: Primary | ICD-10-CM

## 2023-09-13 PROCEDURE — 99999PBSHW PR PBB SHADOW TECHNICAL ONLY FILED TO HB: Mod: PBBFAC,,,

## 2023-09-13 PROCEDURE — 20600 DRAIN/INJ JOINT/BURSA W/O US: CPT | Mod: PBBFAC,50,PN | Performed by: ORTHOPAEDIC SURGERY

## 2023-09-13 PROCEDURE — 99999 PR PBB SHADOW E&M-EST. PATIENT-LVL III: CPT | Mod: PBBFAC,,, | Performed by: ORTHOPAEDIC SURGERY

## 2023-09-13 PROCEDURE — 73130 X-RAY EXAM OF HAND: CPT | Mod: TC,50,PO

## 2023-09-13 PROCEDURE — 73130 XR HAND COMPLETE 3 VIEWS BILATERAL: ICD-10-PCS | Mod: 26,50,, | Performed by: RADIOLOGY

## 2023-09-13 PROCEDURE — 20600 DRAIN/INJ JOINT/BURSA W/O US: CPT | Mod: PBBFAC,PN,RT | Performed by: ORTHOPAEDIC SURGERY

## 2023-09-13 PROCEDURE — 99213 OFFICE O/P EST LOW 20 MIN: CPT | Mod: PBBFAC,PN,25 | Performed by: ORTHOPAEDIC SURGERY

## 2023-09-13 PROCEDURE — 99999 PR PBB SHADOW E&M-EST. PATIENT-LVL III: ICD-10-PCS | Mod: PBBFAC,,, | Performed by: ORTHOPAEDIC SURGERY

## 2023-09-13 PROCEDURE — 20600 SMALL JOINT ASPIRATION/INJECTION: L THUMB CMC: ICD-10-PCS | Mod: S$PBB,50,, | Performed by: ORTHOPAEDIC SURGERY

## 2023-09-13 PROCEDURE — 99213 PR OFFICE/OUTPT VISIT, EST, LEVL III, 20-29 MIN: ICD-10-PCS | Mod: S$PBB,25,, | Performed by: ORTHOPAEDIC SURGERY

## 2023-09-13 PROCEDURE — 99213 OFFICE O/P EST LOW 20 MIN: CPT | Mod: S$PBB,25,, | Performed by: ORTHOPAEDIC SURGERY

## 2023-09-13 PROCEDURE — 99999PBSHW PR PBB SHADOW TECHNICAL ONLY FILED TO HB: ICD-10-PCS | Mod: PBBFAC,,,

## 2023-09-13 PROCEDURE — 73130 X-RAY EXAM OF HAND: CPT | Mod: 26,50,, | Performed by: RADIOLOGY

## 2023-09-13 RX ORDER — TRIAMCINOLONE ACETONIDE 40 MG/ML
40 INJECTION, SUSPENSION INTRA-ARTICULAR; INTRAMUSCULAR
Status: DISCONTINUED | OUTPATIENT
Start: 2023-09-13 | End: 2023-09-13 | Stop reason: HOSPADM

## 2023-09-13 RX ADMIN — TRIAMCINOLONE ACETONIDE 40 MG: 40 INJECTION, SUSPENSION INTRA-ARTICULAR; INTRAMUSCULAR at 02:09

## 2023-09-13 NOTE — PROCEDURES
Small Joint Aspiration/Injection: L thumb CMC    Date/Time: 9/13/2023 2:00 PM    Performed by: Phoenix Stauffer MD  Authorized by: Phoenix Stauffer MD    Consent Done?:  Yes (Verbal)  Indications:  Pain  Site marked: the procedure site was marked    Timeout: prior to procedure the correct patient, procedure, and site was verified    Local anesthetic:  Topical anesthetic  Location:  Thumb  Site:  L thumb CMC (Left thumb CMC joint)  Ultrasonic guidance for needle placement?: No    Needle size:  25 G  Medications:  40 mg triamcinolone acetonide 40 mg/mL; 40 mg triamcinolone acetonide 40 mg/mL (20 mg injected)  Patient tolerance:  Patient tolerated the procedure well with no immediate complications

## 2023-09-13 NOTE — PROGRESS NOTES
Ms. Casas returns to clinic today.  She has complaints of bilateral hand pain and some disfigurement of multiple fingers.  She states that this has come on gradually over time.    Physical exam: Examination of bilateral hands reveals that there are no major skin changes.  She does have changes consistent with aging.  There are significant changes about multiple joints including the thumb CMC joints as well as multiple distal interphalangeal joints which are consistent with arthritis.  Palpation over those joints does produce tenderness.  She does report intact sensation in the median radial ulnar distribution and capillary refill is less than 2 seconds     Radiology: X-rays of bilateral hands were taken in clinic today there are noted to be arthritis at multiple joints.  The CMC joints and distal interphalangeal joints of the most involved.      Assessment:  Bilateral thumb CMC arthritis, multiple joint arthritis     Plan:    1.  After informed consent was obtained injection was placed to the right and left thumb CMC joint.  The patient tolerated that well    2.  She will follow up on a p.r.n. basis

## 2023-09-13 NOTE — PROCEDURES
Small Joint Aspiration/Injection: R thumb CMC    Date/Time: 9/13/2023 2:00 PM    Performed by: Phoenix Stauffer MD  Authorized by: Phoenix Stauffer MD    Consent Done?:  Yes (Verbal)  Indications:  Pain  Site marked: the procedure site was marked    Timeout: prior to procedure the correct patient, procedure, and site was verified    Prep: patient was prepped and draped in usual sterile fashion      Local anesthesia used?: No    Location:  Thumb  Site:  R thumb CMC (Right thumb CMC joint)  Ultrasonic guidance for needle placement?: No    Medications:  40 mg triamcinolone acetonide 40 mg/mL  Patient tolerance:  Patient tolerated the procedure well with no immediate complications

## 2023-10-13 PROBLEM — I50.32 CHRONIC DIASTOLIC CHF (CONGESTIVE HEART FAILURE): Status: RESOLVED | Noted: 2022-08-22 | Resolved: 2023-10-13

## 2023-10-31 ENCOUNTER — OFFICE VISIT (OUTPATIENT)
Dept: ORTHOPEDICS | Facility: CLINIC | Age: 68
End: 2023-10-31
Payer: MEDICARE

## 2023-10-31 VITALS — BODY MASS INDEX: 41.48 KG/M2 | HEIGHT: 65 IN | WEIGHT: 249 LBS

## 2023-10-31 DIAGNOSIS — M70.62 TROCHANTERIC BURSITIS OF BOTH HIPS: Primary | ICD-10-CM

## 2023-10-31 DIAGNOSIS — M70.61 TROCHANTERIC BURSITIS OF BOTH HIPS: Primary | ICD-10-CM

## 2023-10-31 PROCEDURE — 99214 OFFICE O/P EST MOD 30 MIN: CPT | Mod: PBBFAC,PO,25 | Performed by: NURSE PRACTITIONER

## 2023-10-31 PROCEDURE — 99213 PR OFFICE/OUTPT VISIT, EST, LEVL III, 20-29 MIN: ICD-10-PCS | Mod: S$PBB,25,, | Performed by: NURSE PRACTITIONER

## 2023-10-31 PROCEDURE — 99999PBSHW PR PBB SHADOW TECHNICAL ONLY FILED TO HB: Mod: PBBFAC,,,

## 2023-10-31 PROCEDURE — 99213 OFFICE O/P EST LOW 20 MIN: CPT | Mod: S$PBB,25,, | Performed by: NURSE PRACTITIONER

## 2023-10-31 PROCEDURE — 99999 PR PBB SHADOW E&M-EST. PATIENT-LVL IV: ICD-10-PCS | Mod: PBBFAC,,, | Performed by: NURSE PRACTITIONER

## 2023-10-31 PROCEDURE — 20610 DRAIN/INJ JOINT/BURSA W/O US: CPT | Mod: 50,PBBFAC,PO | Performed by: NURSE PRACTITIONER

## 2023-10-31 PROCEDURE — 99999PBSHW PR PBB SHADOW TECHNICAL ONLY FILED TO HB: ICD-10-PCS | Mod: PBBFAC,,,

## 2023-10-31 PROCEDURE — 20610 LARGE JOINT ASPIRATION/INJECTION: BILATERAL GREATER TROCHANTERIC BURSA: ICD-10-PCS | Mod: 50,S$PBB,, | Performed by: NURSE PRACTITIONER

## 2023-10-31 PROCEDURE — 99999 PR PBB SHADOW E&M-EST. PATIENT-LVL IV: CPT | Mod: PBBFAC,,, | Performed by: NURSE PRACTITIONER

## 2023-10-31 PROCEDURE — 20610 DRAIN/INJ JOINT/BURSA W/O US: CPT | Mod: 50,S$PBB,, | Performed by: NURSE PRACTITIONER

## 2023-10-31 RX ORDER — TRIAMCINOLONE ACETONIDE 40 MG/ML
40 INJECTION, SUSPENSION INTRA-ARTICULAR; INTRAMUSCULAR
Status: DISCONTINUED | OUTPATIENT
Start: 2023-10-31 | End: 2023-10-31 | Stop reason: HOSPADM

## 2023-10-31 RX ADMIN — TRIAMCINOLONE ACETONIDE 40 MG: 40 INJECTION, SUSPENSION INTRA-ARTICULAR; INTRAMUSCULAR at 09:10

## 2023-10-31 NOTE — PROGRESS NOTES
Chief Complaint   Patient presents with    Right Hip - Pain    Left Hip - Pain      HPI:   This is a 67 y.o. who presents to clinic today for bilateral hip pain for the past 1 months after no known trauma. Complains of pain while sleeping on side and when descending stairs. Pain is dull and deep. No numbness or tingling. No associated signs or symptoms.      Past Medical History:   Diagnosis Date    Angina pectoris     Anxiety     Aortic valve stenosis     mild-moderate on echo 6/2022    Asthma     CAD (coronary artery disease)     stents; cabg    Chronic diastolic CHF (congestive heart failure)     Chronic pain     Claustrophobia     Coronary artery disease     COVID-19 09/2022    Diverticulitis     Herpes simplex virus (HSV) infection     Hyperlipidemia     Hypothyroidism     Mental disorder     Morbid obesity     Osteopenia     Pneumonia     Primary hypertension     Primary osteoarthritis of left knee 06/24/2021    Restrictive lung disease secondary to obesity 08/29/2022     Past Surgical History:   Procedure Laterality Date    CORONARY ANGIOGRAPHY INCLUDING BYPASS GRAFTS WITH CATHETERIZATION OF LEFT HEART N/A 07/11/2022    Procedure: ANGIOGRAM, CORONARY, INCLUDING BYPASS GRAFT, WITH LEFT HEART CATHETERIZATION;  Surgeon: Sophie Martinez MD;  Location: [facility];  Service: Cardiology;  Laterality: N/A;    CORONARY ANGIOPLASTY WITH STENT PLACEMENT Right     CORONARY ARTERY BYPASS GRAFT      double     JOINT REPLACEMENT Right     total    JOINT REPLACEMENT Left     patital      Current Outpatient Medications on File Prior to Visit   Medication Sig Dispense Refill    acetaminophen (TYLENOL) 500 MG tablet Take 500 mg by mouth 2 (two) times a day.      ALPRAZolam (XANAX) 0.5 MG tablet Take 1 tablet (0.5 mg total) by mouth 2 (two) times daily as needed for Anxiety. 60 tablet 0    apixaban (ELIQUIS) 5 mg Tab Take 1 tablet (5 mg total) by mouth 2 (two) times daily. 180 tablet 3    ascorbic acid, vitamin C, (VITAMIN C)  500 MG tablet Take 500 mg by mouth once daily.      aspirin (ECOTRIN) 81 MG EC tablet Take 81 mg by mouth once daily.      atorvastatin (LIPITOR) 80 MG tablet Take 1 tablet (80 mg total) by mouth once daily. 90 tablet 3    calcium carbonate (OS-RAY) 600 mg calcium (1,500 mg) Tab Take 600 mg by mouth 2 (two) times daily with meals.      dicyclomine (BENTYL) 10 MG capsule Take 1 capsule (10 mg total) by mouth 3 (three) times daily as needed (cramping). 30 capsule 3    fluticasone furoate-vilanteroL (BREO ELLIPTA) 100-25 mcg/dose diskus inhaler Inhale 1 puff into the lungs once daily. Controller 90 each 3    fluticasone propionate (FLONASE) 50 mcg/actuation nasal spray 1 spray (50 mcg total) by Each Nostril route 2 (two) times a day. 48 g 3    furosemide (LASIX) 20 MG tablet Take 1 tablet (20 mg total) by mouth once daily. 90 tablet 3    levocetirizine (XYZAL) 5 MG tablet Take 5 mg by mouth every evening.      levothyroxine (SYNTHROID) 150 MCG tablet Take 1 tablet (150 mcg total) by mouth before breakfast. 90 tablet 0    magnesium oxide 500 mg Tab Take 1 tablet by mouth once daily.      metoprolol succinate (TOPROL-XL) 25 MG 24 hr tablet Take 1 tablet (25 mg total) by mouth once daily. 90 tablet 3    montelukast (SINGULAIR) 10 mg tablet Take 1 tablet (10 mg total) by mouth every evening. 90 tablet 3    multivitamin (THERAGRAN) per tablet Take 1 tablet by mouth once daily.      nitroGLYCERIN 0.4 MG/DOSE TL SPRY (NITROLINGUAL) 400 mcg/spray spray Place 2 sprays under the tongue every 5 (five) minutes as needed for Chest pain. 0.004 g 12    nystatin (MYCOSTATIN) cream Apply topically 2 (two) times daily.      propylene glycoL, PF, (SYSTANE COMPLETE PF) 0.6 % Drop Apply 1-2 drops to eye as needed (dry eyes).      ramipriL (ALTACE) 5 MG capsule Take 1 capsule (5 mg total) by mouth once daily. 90 capsule 3    vitamin D (VITAMIN D3) 1000 units Tab Take 1,000 Units by mouth once daily.      albuterol (PROVENTIL) 2.5 mg /3 mL  "(0.083 %) nebulizer solution Take 3 mLs (2.5 mg total) by nebulization every 6 (six) hours as needed for Wheezing. Rescue 360 each 3    levalbuterol (XOPENEX HFA) 45 mcg/actuation inhaler Inhale 1-2 puffs into the lungs every 6 (six) hours as needed for Wheezing. Rescue 45 g 3    ranolazine (RANEXA) 500 MG Tb12 Take 1 tablet (500 mg total) by mouth 2 (two) times daily. 180 tablet 3     No current facility-administered medications on file prior to visit.     Review of patient's allergies indicates:   Allergen Reactions    Compazine [prochlorperazine edisylate]      "Facial paralysis"    Prochlorperazine      Other reaction(s): Not available     Family History   Problem Relation Age of Onset    Atrial fibrillation Mother     Thyroid disease Mother     Osteoporosis Mother     Anxiety disorder Mother     Heart disease Father     Anxiety disorder Father     Mental illness Maternal Uncle         schizophrenia     Anxiety disorder Maternal Grandmother     Ulcers Maternal Grandmother     Cancer Paternal Aunt     Diabetes Paternal Aunt      Social History     Socioeconomic History    Marital status:      Spouse name: Mynor   Tobacco Use    Smoking status: Never    Smokeless tobacco: Never   Substance and Sexual Activity    Alcohol use: Not Currently    Drug use: Never       Review of Systems:  Constitutional:  Denies fever or chills   Eyes:  Denies change in visual acuity   HENT:  Denies nasal congestion or sore throat   Respiratory:  Denies cough or shortness of breath   Cardiovascular:  Denies chest pain or edema   GI:  Denies abdominal pain, nausea, vomiting, bloody stools or diarrhea   :  Denies dysuria   Integument:  Denies rash   Neurologic:  Denies headache, focal weakness or sensory changes   Endocrine:  Denies polyuria or polydipsia   Lymphatic:  Denies swollen glands   Psychiatric:  Denies depression or anxiety     Physical Exam:   Constitutional:  Well developed, well nourished, no acute distress, " non-toxic appearance   Integument:  Well hydrated  Neurologic:  Alert & oriented x 3  Psychiatric:  Speech and behavior appropriate     Bilateral Hip Exam   Tenderness   The patient is experiencing tenderness in the greater trochanter.  Range of Motion   The patient has normal hip ROM.  Muscle Strength   Abduction: 4/5   Other   Erythema: absent  Sensation: normal  Pulse: present          Trochanteric bursitis of both hips  -     Large Joint Aspiration/Injection: bilateral greater trochanteric bursa  -     triamcinolone acetonide injection 40 mg  -     triamcinolone acetonide injection 40 mg       Using an aseptic technique, I injected 5 cc of lidocaine 1% without and 1 cc of kenalog 40mg into the bilateral Hip. The patient tolerated this well.     Rtc in 6 weeks or as needed.

## 2023-10-31 NOTE — PROCEDURES
Large Joint Aspiration/Injection: bilateral greater trochanteric bursa    Date/Time: 10/31/2023 9:20 AM    Performed by: Mary Anne Bowie FNP  Authorized by: Mary Anne Bowie FNP    Consent Done?:  Yes (Verbal)  Indications:  Pain  Timeout: prior to procedure the correct patient, procedure, and site was verified    Prep: patient was prepped and draped in usual sterile fashion      Local anesthesia used?: Yes    Local anesthetic:  Lidocaine 1% without epinephrine  Anesthetic total (ml):  5      Details:  Needle Size:  21 G  Approach:  Lateral  Location:  Hip  Laterality:  Bilateral  Site:  Bilateral greater trochanteric bursa  Medications (Right):  40 mg triamcinolone acetonide 40 mg/mL  Medications (Left):  40 mg triamcinolone acetonide 40 mg/mL  Patient tolerance:  Patient tolerated the procedure well with no immediate complications

## 2023-12-24 PROBLEM — R07.9 CHEST PAIN: Status: ACTIVE | Noted: 2022-06-23

## 2023-12-24 PROBLEM — R79.89 ELEVATED LIVER FUNCTION TESTS: Status: ACTIVE | Noted: 2023-12-24

## 2023-12-24 PROBLEM — I50.33 ACUTE ON CHRONIC DIASTOLIC HEART FAILURE: Status: ACTIVE | Noted: 2022-08-22

## 2023-12-25 PROBLEM — I35.0 AORTIC STENOSIS: Status: RESOLVED | Noted: 2022-08-22 | Resolved: 2023-12-25

## 2023-12-25 PROBLEM — I50.33 ACUTE ON CHRONIC DIASTOLIC HEART FAILURE: Status: RESOLVED | Noted: 2022-08-22 | Resolved: 2023-12-25

## 2023-12-25 PROBLEM — Z71.89 ACP (ADVANCE CARE PLANNING): Status: RESOLVED | Noted: 2022-11-22 | Resolved: 2023-12-25

## 2023-12-25 PROBLEM — R79.89 ELEVATED LIVER FUNCTION TESTS: Status: RESOLVED | Noted: 2023-12-24 | Resolved: 2023-12-25

## 2023-12-25 PROBLEM — E78.5 HYPERLIPIDEMIA: Status: RESOLVED | Noted: 2021-02-04 | Resolved: 2023-12-25

## 2023-12-29 PROBLEM — R07.89 ATYPICAL CHEST PAIN: Status: ACTIVE | Noted: 2022-06-23

## 2024-01-22 ENCOUNTER — TELEPHONE (OUTPATIENT)
Dept: CARDIOTHORACIC SURGERY | Facility: CLINIC | Age: 69
End: 2024-01-22
Payer: MEDICARE

## 2024-01-22 NOTE — TELEPHONE ENCOUNTER
----- Message from Erick Tamayo sent at 1/22/2024 12:04 PM CST -----  Regarding: appt  Contact: [phone]  Pt is calling to be schedule from referral   I25.112 (ICD-10-CM) - Coronary artery disease involving native heart with refractory angina pectoris, unspecified vessel or lesion type  I10 (ICD-10-CM) - Essential hypertension  E66.01,Z68.41 (ICD-10-CM) - Class 3 severe obesity with body mass index (BMI) of 40.0 to 44.9 in adult, unspecified obesity type, unspecified whether serious comorbidity present  F41.1 (ICD-10-CM) - OLYA (generalized anxiety disorder). Pending Auth. Please call

## 2024-01-22 NOTE — TELEPHONE ENCOUNTER
Spoke with the pt and scheduled apt with Dr. Gamble .  Pt confirmed date and time for apt and apt letter mailed out to pt home address.

## 2024-01-24 ENCOUNTER — TELEPHONE (OUTPATIENT)
Dept: CARDIOTHORACIC SURGERY | Facility: CLINIC | Age: 69
End: 2024-01-24
Payer: MEDICARE

## 2024-01-24 NOTE — TELEPHONE ENCOUNTER
Spoke with the pt and re scheduled her apt on Feb 8th for 1.45 and pt confirmed apt time and date.  Pt do have access to see apt through her pt portal.  Pt verbalized understand.

## 2024-01-24 NOTE — TELEPHONE ENCOUNTER
----- Message from Cait Chavez sent at 1/24/2024  1:30 PM CST -----  Regarding: Sooner Appt offered  Contact: [phone]  Hi, pt say they received a call offering a sooner appt and would like to spk with someone in the office. Pls call the pt at   [phone] to discuss.  Thank you.

## 2024-02-02 ENCOUNTER — TELEPHONE (OUTPATIENT)
Dept: CARDIOTHORACIC SURGERY | Facility: CLINIC | Age: 69
End: 2024-02-02
Payer: MEDICARE

## 2024-02-02 NOTE — TELEPHONE ENCOUNTER
Attempted call to pt to reschedule consultation appointment scheduled for next Thursday. No answer, left VM with request for call back and provided my direct line.

## 2024-02-08 ENCOUNTER — OFFICE VISIT (OUTPATIENT)
Dept: CARDIOTHORACIC SURGERY | Facility: CLINIC | Age: 69
End: 2024-02-08
Payer: MEDICARE

## 2024-02-08 VITALS
HEART RATE: 71 BPM | HEIGHT: 65 IN | BODY MASS INDEX: 42.33 KG/M2 | WEIGHT: 254.06 LBS | OXYGEN SATURATION: 97 % | SYSTOLIC BLOOD PRESSURE: 150 MMHG | DIASTOLIC BLOOD PRESSURE: 73 MMHG

## 2024-02-08 DIAGNOSIS — I10 ESSENTIAL HYPERTENSION: ICD-10-CM

## 2024-02-08 DIAGNOSIS — I25.112 CORONARY ARTERY DISEASE INVOLVING NATIVE HEART WITH REFRACTORY ANGINA PECTORIS, UNSPECIFIED VESSEL OR LESION TYPE: ICD-10-CM

## 2024-02-08 DIAGNOSIS — E66.01 CLASS 3 SEVERE OBESITY WITH BODY MASS INDEX (BMI) OF 40.0 TO 44.9 IN ADULT, UNSPECIFIED OBESITY TYPE, UNSPECIFIED WHETHER SERIOUS COMORBIDITY PRESENT: ICD-10-CM

## 2024-02-08 PROCEDURE — 99215 OFFICE O/P EST HI 40 MIN: CPT | Mod: PBBFAC | Performed by: THORACIC SURGERY (CARDIOTHORACIC VASCULAR SURGERY)

## 2024-02-08 PROCEDURE — 99204 OFFICE O/P NEW MOD 45 MIN: CPT | Mod: S$PBB,,, | Performed by: THORACIC SURGERY (CARDIOTHORACIC VASCULAR SURGERY)

## 2024-02-08 PROCEDURE — 99999 PR PBB SHADOW E&M-EST. PATIENT-LVL V: CPT | Mod: PBBFAC,,, | Performed by: THORACIC SURGERY (CARDIOTHORACIC VASCULAR SURGERY)

## 2024-02-08 NOTE — PROGRESS NOTES
"Subjective:      Patient ID: Melissa Casas is a 68 y.o. female.    Chief Complaint: No chief complaint on file.      HPI:  Melissa Casas is a 68 y.o. female who presents as an initial consult for moderate aortic stenosis, redo sternotomy and coronary artery disease.  She has a medical history of  prior bypass, aortic stenosis, chronic diastolic heart failure and morbid obesity with an unprovoked DVT and pulmonary embolism.  She reports double bypass to RCA in 2005 in [facility]. She was recently admitted to the hospital for chest pain which lead to a cardiac evaluation which revealed  to RCA and 75% stenosis of the LAD.  She was also found to moderate aortic stenosis with a mean gradient of 20 and AKHIL of 1.14.  She reports one previous sternotomy.   Using cane with ambulation. BMI 42.28.        Family and social history reviewed    Review of patient's allergies indicates:   Allergen Reactions    Compazine [prochlorperazine edisylate]      "Facial paralysis"    Prochlorperazine      Other reaction(s): Not available     Past Medical History:   Diagnosis Date    Angina pectoris     Anxiety     Aortic valve stenosis     mild-moderate on echo 6/2022    Asthma     CAD (coronary artery disease)     stents; cabg    Chronic diastolic CHF (congestive heart failure)     Chronic pain     Claustrophobia     Coronary artery disease     COVID-19 09/2022    Diverticulitis     Herpes simplex virus (HSV) infection     Hyperlipidemia     Hypothyroidism     Mental disorder     Morbid obesity     Osteopenia     Pneumonia     Primary hypertension     Primary osteoarthritis of left knee 06/24/2021    Restrictive lung disease secondary to obesity 08/29/2022     Past Surgical History:   Procedure Laterality Date    CORONARY ANGIOGRAPHY  12/28/2023    Procedure: Coronary angiogram w/Bypass graft study;  Surgeon: Aquiles Trimble MD;  Location: [facility];  Service: Cardiovascular;;    CORONARY ANGIOGRAPHY INCLUDING " BYPASS GRAFTS WITH CATHETERIZATION OF LEFT HEART N/A 07/11/2022    Procedure: ANGIOGRAM, CORONARY, INCLUDING BYPASS GRAFT, WITH LEFT HEART CATHETERIZATION;  Surgeon: Sophie Martinez MD;  Location: [facility];  Service: Cardiology;  Laterality: N/A;    CORONARY ANGIOPLASTY WITH STENT PLACEMENT Right     CORONARY ARTERY BYPASS GRAFT      double     FRACTIONAL FLOW RESERVE MEASUREMENT, MYOCARDIAL  12/28/2023    Procedure: FFR LAD Rm 2324;  Surgeon: Aquiles Trimble MD;  Location: [facility];  Service: Cardiovascular;;    JOINT REPLACEMENT Right     total    JOINT REPLACEMENT Left     patital     LEFT HEART CATHETERIZATION  12/28/2023    Procedure: Left heart cath;  Surgeon: Aquiles Trimble MD;  Location: [facility];  Service: Cardiovascular;;     Family History       Problem Relation (Age of Onset)    Anxiety disorder Mother, Father, Maternal Grandmother    Atrial fibrillation Mother    Cancer Paternal Aunt    Diabetes Paternal Aunt    Heart disease Father    Mental illness Maternal Uncle    Osteoporosis Mother    Thyroid disease Mother    Ulcers Maternal Grandmother          Social History     Socioeconomic History    Marital status:      Spouse name: Mynor   Tobacco Use    Smoking status: Never    Smokeless tobacco: Never   Substance and Sexual Activity    Alcohol use: Not Currently    Drug use: Never     Social Determinants of Health     Financial Resource Strain: Low Risk  (12/29/2023)    Overall Financial Resource Strain (CARDIA)     Difficulty of Paying Living Expenses: Not hard at all   Food Insecurity: No Food Insecurity (12/29/2023)    Hunger Vital Sign     Worried About Running Out of Food in the Last Year: Never true     Ran Out of Food in the Last Year: Never true   Transportation Needs: No Transportation Needs (12/29/2023)    PRAPARE - Transportation     Lack of Transportation (Medical): No     Lack of Transportation (Non-Medical): No   Physical Activity: Unknown (12/29/2023)     Exercise Vital Sign     Days of Exercise per Week: 0 days   Stress: No Stress Concern Present (12/29/2023)    Citizen of [place] of Occupational Health - Occupational Stress Questionnaire     Feeling of Stress : Not at all   Social Connections: Unknown (12/29/2023)    Social Connection and Isolation Panel [NHANES]     Frequency of Communication with Friends and Family: Three times a week     Frequency of Social Gatherings with Friends and Family: Once a week     Active Member of Clubs or Organizations: No     Attends Club or Organization Meetings: Patient declined     Marital Status:    Housing Stability: Low Risk  (12/29/2023)    Housing Stability Vital Sign     Unable to Pay for Housing in the Last Year: No     Number of Places Lived in the Last Year: 2     Unstable Housing in the Last Year: No       Current Outpatient Medications:     acetaminophen (TYLENOL) 500 MG tablet, Take 500 mg by mouth 2 (two) times a day., Disp: , Rfl:     albuterol (PROVENTIL) 2.5 mg /3 mL (0.083 %) nebulizer solution, Take 3 mLs (2.5 mg total) by nebulization every 6 (six) hours as needed for Wheezing. Rescue, Disp: 360 each, Rfl: 3    ALPRAZolam (XANAX) 0.5 MG tablet, Take 1 tablet (0.5 mg total) by mouth 2 (two) times daily as needed for Anxiety., Disp: 60 tablet, Rfl: 0    apixaban (ELIQUIS) 5 mg Tab, Take 1 tablet (5 mg total) by mouth 2 (two) times daily., Disp: , Rfl:     ascorbic acid, vitamin C, (VITAMIN C) 500 MG tablet, Take 500 mg by mouth once daily., Disp: , Rfl:     aspirin (ECOTRIN) 81 MG EC tablet, Take 81 mg by mouth once daily., Disp: , Rfl:     atorvastatin (LIPITOR) 80 MG tablet, Take 1 tablet (80 mg total) by mouth once daily., Disp: 90 tablet, Rfl: 3    calcium carbonate (OS-RAY) 600 mg calcium (1,500 mg) Tab, Take 600 mg by mouth 2 (two) times daily with meals., Disp: , Rfl:     dicyclomine (BENTYL) 10 MG capsule, Take 1 capsule (10 mg total) by mouth 3 (three) times daily as needed (cramping)., Disp: 30  capsule, Rfl: 3    fluticasone furoate-vilanteroL (BREO ELLIPTA) 100-25 mcg/dose diskus inhaler, Inhale 1 puff into the lungs once daily. Controller, Disp: 90 each, Rfl: 3    fluticasone propionate (FLONASE) 50 mcg/actuation nasal spray, 1 spray (50 mcg total) by Each Nostril route 2 (two) times a day., Disp: 48 g, Rfl: 3    furosemide (LASIX) 20 MG tablet, Take 1 tablet (20 mg total) by mouth once daily., Disp: 90 tablet, Rfl: 3    hydrocodone-chlorpheniramine (TUSSIONEX) 10-8 mg/5 mL suspension, Take 5 mLs by mouth every 12 (twelve) hours as needed for Cough., Disp: 473 mL, Rfl: 0    levocetirizine (XYZAL) 5 MG tablet, Take 5 mg by mouth every evening., Disp: , Rfl:     levothyroxine (SYNTHROID) 150 MCG tablet, TAKE 1 TABLET BEFORE BREAKFAST, Disp: 90 tablet, Rfl: 0    magnesium oxide 500 mg Tab, Take 1 tablet by mouth once daily., Disp: , Rfl:     methylPREDNISolone (MEDROL DOSEPACK) 4 mg tablet, use as directed, Disp: 1 each, Rfl: 1    metoprolol succinate (TOPROL-XL) 25 MG 24 hr tablet, Take 1 tablet (25 mg total) by mouth once daily., Disp: 90 tablet, Rfl: 3    montelukast (SINGULAIR) 10 mg tablet, Take 1 tablet (10 mg total) by mouth every evening., Disp: 90 tablet, Rfl: 3    multivitamin (THERAGRAN) per tablet, Take 1 tablet by mouth once daily., Disp: , Rfl:     nitroGLYCERIN 0.4 MG/DOSE TL SPRY (NITROLINGUAL) 400 mcg/spray spray, Place 2 sprays under the tongue every 5 (five) minutes as needed for Chest pain., Disp: 0.004 g, Rfl: 12    nystatin (MYCOSTATIN) cream, Apply topically 2 (two) times daily., Disp: , Rfl:     propylene glycoL, PF, (SYSTANE COMPLETE PF) 0.6 % Drop, Apply 1-2 drops to eye as needed (dry eyes)., Disp: , Rfl:     ramipriL (ALTACE) 5 MG capsule, Take 1 capsule (5 mg total) by mouth once daily., Disp: 90 capsule, Rfl: 3    ranolazine (RANEXA) 500 MG Tb12, Take 1 tablet (500 mg total) by mouth 2 (two) times daily., Disp: 180 tablet, Rfl: 3    vitamin D (VITAMIN D3) 1000 units Tab, Take  1,000 Units by mouth once daily., Disp: , Rfl:   Current medications Reviewed    Review of Systems   Constitutional:  Negative for activity change, appetite change, fatigue and fever.   HENT:  Negative for nosebleeds.    Respiratory:  Negative for cough.    Cardiovascular:  Positive for chest pain. Negative for palpitations and leg swelling.   Gastrointestinal:  Negative for abdominal distention, abdominal pain and nausea.   Genitourinary:  Negative for frequency.   Musculoskeletal:  Negative for arthralgias and myalgias.   Skin:  Negative for rash.   Neurological:  Negative for dizziness and numbness.   Hematological:  Does not bruise/bleed easily.     Objective:   Physical Exam  Constitutional:       Appearance: She is obese.   HENT:      Head: Normocephalic and atraumatic.   Eyes:      Extraocular Movements: Extraocular movements intact.   Cardiovascular:      Rate and Rhythm: Normal rate and regular rhythm.   Pulmonary:      Effort: Pulmonary effort is normal.   Abdominal:      General: Abdomen is flat.      Palpations: Abdomen is soft.   Musculoskeletal:         General: Normal range of motion.      Cervical back: Normal range of motion.   Skin:     General: Skin is warm and dry.      Capillary Refill: Capillary refill takes less than 2 seconds.   Neurological:      General: No focal deficit present.      Gait: Gait abnormal.         Diagnostic Results: Reviewed  ECHO 12/25/2023:    Left Ventricle: The left ventricle is normal in size. Normal wall thickness. Normal wall motion. There is normal systolic function with a visually estimated ejection fraction of 60 - 65%. There is normal diastolic function.    Right Ventricle: Normal right ventricular cavity size. Wall thickness is normal. Right ventricle wall motion  is normal. Systolic function is normal.    Aortic Valve: There is moderate aortic valve sclerosis. There is moderate stenosis. Aortic valve area by VTI is 1.14 cm². Mean gradient is 20 mmHg. The  dimensionless index is 0.45. There is mild aortic regurgitation.    IVC/SVC: Intermediate venous pressure at 8 mmHg.     12/28/2023 [facility]:  Impression   .  Totally occluded right coronary artery.    .  Severe LAD stenosis by IFR measurement.    .  Moderate aortic stenosis   Assessment:   CAD  Moderate Aortic Stenosis  Plan:     CTS Attending Note:    I have personally taken the history and examined this patient and agree with the JOYCE's note as stated above.  68-year-old woman with known coronary disease.  She had a bypass operation several years ago that was single-vessel, with saphenous vein graft to the right coronary.  That graft is known to be occluded.  Her right coronary is occluded, and has been for some time.  She has good right to right collaterals.  She has proximal to mid left anterior descending disease that was 0.73 on FFR.  She has only moderate aortic stenosis with a mean gradient of 20 and a calculated valve area of 1.14 centimeter squared.  I did not see it that peak velocity was reported.  Given the well collateralized right and only moderate aortic stenosis, I would not recommend redo sternotomy and bypass for the left anterior descending alone.  I think it would be reasonable to stent her proximal LAD and manage the right coronary with its excellent collaterals medically.  Oddly, the region that was ischemic on the nuclear study was the lateral wall.  Incidentally, her operative risk is not only related to her previous coronary artery bypass grafting but also because she required a wheelchair to return to the parking deck after her clinic visit.

## 2024-04-03 ENCOUNTER — OFFICE VISIT (OUTPATIENT)
Dept: ORTHOPEDICS | Facility: CLINIC | Age: 69
End: 2024-04-03
Payer: MEDICARE

## 2024-04-03 DIAGNOSIS — M18.0 PRIMARY ARTHROSIS OF FIRST CARPOMETACARPAL JOINTS, BILATERAL: Primary | ICD-10-CM

## 2024-04-03 PROCEDURE — 20600 DRAIN/INJ JOINT/BURSA W/O US: CPT | Mod: PBBFAC,F5,PO | Performed by: ORTHOPAEDIC SURGERY

## 2024-04-03 PROCEDURE — 99213 OFFICE O/P EST LOW 20 MIN: CPT | Mod: PBBFAC,PO,25 | Performed by: ORTHOPAEDIC SURGERY

## 2024-04-03 PROCEDURE — 99213 OFFICE O/P EST LOW 20 MIN: CPT | Mod: S$PBB,25,, | Performed by: ORTHOPAEDIC SURGERY

## 2024-04-03 PROCEDURE — 99999PBSHW PR PBB SHADOW TECHNICAL ONLY FILED TO HB: Mod: PBBFAC,,,

## 2024-04-03 PROCEDURE — 99999 PR PBB SHADOW E&M-EST. PATIENT-LVL III: CPT | Mod: PBBFAC,,, | Performed by: ORTHOPAEDIC SURGERY

## 2024-04-03 RX ORDER — TRIAMCINOLONE ACETONIDE 40 MG/ML
40 INJECTION, SUSPENSION INTRA-ARTICULAR; INTRAMUSCULAR
Status: DISCONTINUED | OUTPATIENT
Start: 2024-04-03 | End: 2024-04-03 | Stop reason: HOSPADM

## 2024-04-03 RX ADMIN — TRIAMCINOLONE ACETONIDE 40 MG: 40 INJECTION, SUSPENSION INTRA-ARTICULAR; INTRAMUSCULAR at 02:04

## 2024-04-03 NOTE — PROCEDURES
Small Joint Aspiration/Injection: R thumb CMC    Date/Time: 4/3/2024 2:40 PM    Performed by: Phoenix Stauffer MD  Authorized by: Phoenix Stauffer MD    Consent Done?:  Yes (Verbal)  Indications:  Pain  Site marked: the procedure site was marked    Timeout: prior to procedure the correct patient, procedure, and site was verified    Prep: patient was prepped and draped in usual sterile fashion      Local anesthesia used?: No    Location:  Thumb  Site:  R thumb CMC (Right thumb CMC joint)  Ultrasonic guidance for needle placement?: No    Medications:  40 mg triamcinolone acetonide 40 mg/mL  Patient tolerance:  Patient tolerated the procedure well with no immediate complications

## 2024-04-03 NOTE — PROGRESS NOTES
Ms Casas returns to clinic today.  Has a history of bilateral thumb CMC arthritis.  He has been injected past.  She is here today for discussion of further treatment options     Physical exam: Examination of hands reveals that there is no edema.  She does have prominence of the thumb CMC joint.  On the left she does have some significant skin changes related to repeated steroid injection.  On the right the skin changes are minimal.  She does have tenderness to palpation over those joints.  She was grossly neurovascularly intact     Radiology: X-rays of bilateral hands from 09/13/2023 have been reviewed.  She was noted have severe bilateral thumb CMC arthritis     Assessment:  Bilateral thumb CMC arthritis     Plan:    1. I have discussed the possibility of surgical intervention.  At this time she states that her right hand is worse than the left but he was not ready to undergo a surgery.    2.  After consent was obtained injection was placed to the right thumb CMC joint.  I have discussed the fact that if he starts to develop skin changes that I will no longer be able to inject and we will have to considerable highly surgery    3.  She will follow up with me as needed

## 2024-06-27 ENCOUNTER — OFFICE VISIT (OUTPATIENT)
Dept: ORTHOPEDICS | Facility: CLINIC | Age: 69
End: 2024-06-27
Payer: MEDICARE

## 2024-06-27 VITALS — WEIGHT: 257.94 LBS | HEIGHT: 65 IN | BODY MASS INDEX: 42.98 KG/M2

## 2024-06-27 DIAGNOSIS — M70.62 TROCHANTERIC BURSITIS OF BOTH HIPS: Primary | ICD-10-CM

## 2024-06-27 DIAGNOSIS — M70.61 TROCHANTERIC BURSITIS OF BOTH HIPS: Primary | ICD-10-CM

## 2024-06-27 PROCEDURE — 20610 DRAIN/INJ JOINT/BURSA W/O US: CPT | Mod: 50,PBBFAC,PO | Performed by: NURSE PRACTITIONER

## 2024-06-27 PROCEDURE — 99212 OFFICE O/P EST SF 10 MIN: CPT | Mod: PBBFAC,PO | Performed by: NURSE PRACTITIONER

## 2024-06-27 PROCEDURE — 99999PBSHW PR PBB SHADOW TECHNICAL ONLY FILED TO HB: Mod: PBBFAC,,,

## 2024-06-27 PROCEDURE — 99999 PR PBB SHADOW E&M-EST. PATIENT-LVL II: CPT | Mod: PBBFAC,,, | Performed by: NURSE PRACTITIONER

## 2024-06-27 RX ORDER — TRIAMCINOLONE ACETONIDE 40 MG/ML
40 INJECTION, SUSPENSION INTRA-ARTICULAR; INTRAMUSCULAR
Status: DISCONTINUED | OUTPATIENT
Start: 2024-06-27 | End: 2024-06-27 | Stop reason: HOSPADM

## 2024-06-27 RX ADMIN — TRIAMCINOLONE ACETONIDE 40 MG: 40 INJECTION, SUSPENSION INTRA-ARTICULAR; INTRAMUSCULAR at 01:06

## 2024-06-27 NOTE — PROCEDURES
Large Joint Aspiration/Injection: bilateral greater trochanteric bursa    Date/Time: 6/27/2024 1:40 PM    Performed by: Mary Anne Bowie FNP  Authorized by: Mary Anne Bowie FNP    Consent Done?:  Yes (Verbal)  Indications:  Pain  Timeout: prior to procedure the correct patient, procedure, and site was verified    Prep: patient was prepped and draped in usual sterile fashion      Local anesthesia used?: Yes    Local anesthetic:  Lidocaine 1% without epinephrine  Anesthetic total (ml):  5      Details:  Needle Size:  21 G  Approach:  Lateral  Location:  Hip  Laterality:  Bilateral  Site:  Bilateral greater trochanteric bursa  Medications (Right):  40 mg triamcinolone acetonide 40 mg/mL  Medications (Left):  40 mg triamcinolone acetonide 40 mg/mL  Patient tolerance:  Patient tolerated the procedure well with no immediate complications

## 2024-06-27 NOTE — PROGRESS NOTES
Chief Complaint   Patient presents with    Left Hip - Pain    Right Hip - Pain      HPI:   This is a 68 y.o. who presents to clinic today for bilateral hip pain for the past 2 months after no known trauma. Complains of pain while sleeping on side and when descending stairs. Pain is dull and deep. No numbness or tingling. No associated signs or symptoms.      Past Medical History:   Diagnosis Date    Angina pectoris     Anxiety     Aortic valve stenosis     mild-moderate on echo 6/2022    Asthma     CAD (coronary artery disease)     stents; cabg    Chronic diastolic CHF (congestive heart failure)     Chronic pain     Claustrophobia     Coronary artery disease     COVID-19 09/2022    Diverticulitis     Herpes simplex virus (HSV) infection     Hyperlipidemia     Hypothyroidism     Mental disorder     Morbid obesity     Osteopenia     Pneumonia     Primary hypertension     Primary osteoarthritis of left knee 06/24/2021    Restrictive lung disease secondary to obesity 08/29/2022     Past Surgical History:   Procedure Laterality Date    CORONARY ANGIOGRAPHY  12/28/2023    Procedure: Coronary angiogram w/Bypass graft study;  Surgeon: Aquiles Trimble MD;  Location: [facility] CATH;  Service: Cardiovascular;;    CORONARY ANGIOGRAPHY INCLUDING BYPASS GRAFTS WITH CATHETERIZATION OF LEFT HEART N/A 07/11/2022    Procedure: ANGIOGRAM, CORONARY, INCLUDING BYPASS GRAFT, WITH LEFT HEART CATHETERIZATION;  Surgeon: Sophie Martinez MD;  Location: [facility] CATH;  Service: Cardiology;  Laterality: N/A;    CORONARY ANGIOPLASTY WITH STENT PLACEMENT Right     CORONARY ANGIOPLASTY WITH STENT PLACEMENT  4/9/2024    Procedure: IVUS LAD;  Surgeon: Aquiles Trimble MD;  Location: [facility] CATH;  Service: Cardiovascular;;    CORONARY ARTERY BYPASS GRAFT      double     FRACTIONAL FLOW RESERVE MEASUREMENT, MYOCARDIAL  12/28/2023    Procedure: FFR LAD Rm 2324;  Surgeon: Aquiles Trimble MD;  Location: [facility] CATH;  Service:  Cardiovascular;;    JOINT REPLACEMENT Right     total KNEE    JOINT REPLACEMENT Left     patital  KNEE    LEFT HEART CATHETERIZATION  12/28/2023    Procedure: Left heart cath;  Surgeon: Aquiles Trimble MD;  Location: [facility] CATH;  Service: Cardiovascular;;    LITHOTRIPSY, CORONARY TRANSLUMINAL, PERCUTANEOUS  4/9/2024    Procedure: PTCA LAD (Shockwave);  Surgeon: Aquiles Trimble MD;  Location: [facility] CATH;  Service: Cardiovascular;;    PERCUTANEOUS CORONARY INTERVENTION, ARTERY  4/9/2024    Procedure: AIDE LAD;  Surgeon: Aquiles Trimble MD;  Location: [facility] CATH;  Service: Cardiovascular;;     Current Outpatient Medications on File Prior to Visit   Medication Sig Dispense Refill    acetaminophen (TYLENOL) 500 MG tablet Take 500 mg by mouth 2 (two) times a day.      ALPRAZolam (XANAX) 0.5 MG tablet Take 1 tablet (0.5 mg total) by mouth 2 (two) times daily as needed for Anxiety. 60 tablet 0    apixaban (ELIQUIS) 5 mg Tab Take 1 tablet (5 mg total) by mouth 2 (two) times daily. Do not take for 5 days following angiogram      ascorbic acid, vitamin C, (VITAMIN C) 500 MG tablet Take 500 mg by mouth once daily.      atorvastatin (LIPITOR) 80 MG tablet Take 1 tablet (80 mg total) by mouth once daily. 90 tablet 3    benzonatate (TESSALON) 100 MG capsule Take 100 mg by mouth 3 (three) times daily as needed.      calcium carbonate (OS-RAY) 600 mg calcium (1,500 mg) Tab Take 600 mg by mouth once daily.      clopidogreL (PLAVIX) 75 mg tablet Take 1 tablet (75 mg total) by mouth once daily. 90 tablet 3    dicyclomine (BENTYL) 10 MG capsule Take 1 capsule (10 mg total) by mouth 3 (three) times daily as needed (cramping). 30 capsule 3    evolocumab (REPATHA SURECLICK) 140 mg/mL PnIj Inject 1 mL (140 mg total) into the skin every 14 (fourteen) days. 6 mL 3    fluticasone furoate-vilanteroL (BREO ELLIPTA) 100-25 mcg/dose diskus inhaler Inhale 1 puff into the lungs once daily. Controller 90 each 3     fluticasone propionate (FLONASE) 50 mcg/actuation nasal spray 1 spray (50 mcg total) by Each Nostril route 2 (two) times a day. 48 g 3    furosemide (LASIX) 20 MG tablet Take 1 tablet (20 mg total) by mouth once daily. 90 tablet 3    levocetirizine (XYZAL) 5 MG tablet Take 5 mg by mouth every evening.      levothyroxine (SYNTHROID) 137 MCG Tab tablet Take 1 tablet (137 mcg total) by mouth before breakfast. 90 tablet 0    magnesium oxide 500 mg Tab Take 1 tablet by mouth once daily.      metoprolol succinate (TOPROL-XL) 25 MG 24 hr tablet Take 1 tablet (25 mg total) by mouth once daily. 90 tablet 3    montelukast (SINGULAIR) 10 mg tablet Take 1 tablet (10 mg total) by mouth every evening. 90 tablet 3    multivitamin (THERAGRAN) per tablet Take 1 tablet by mouth once daily.      nystatin (MYCOSTATIN) cream Apply topically 2 (two) times daily as needed.      propylene glycoL, PF, (SYSTANE COMPLETE PF) 0.6 % Drop Apply 1-2 drops to eye as needed (dry eyes).      ramipriL (ALTACE) 5 MG capsule Take 1 capsule (5 mg total) by mouth once daily. 90 capsule 3    vitamin D (VITAMIN D3) 1000 units Tab Take 1,000 Units by mouth once daily.      YUVAFEM 10 mcg Tab Place 1 tablet vaginally twice a week.      albuterol (PROVENTIL) 2.5 mg /3 mL (0.083 %) nebulizer solution Take 3 mLs (2.5 mg total) by nebulization every 6 (six) hours as needed for Wheezing. Rescue 360 each 3    nitroGLYCERIN 0.4 MG/DOSE TL SPRY (NITROLINGUAL) 400 mcg/spray spray Place 2 sprays under the tongue every 5 (five) minutes as needed for Chest pain. 0.004 g 12     Current Facility-Administered Medications on File Prior to Visit   Medication Dose Route Frequency Provider Last Rate Last Admin    dextrose 50% injection 12.5 g  12.5 g Intravenous PRN Aquiles Trimble MD        dextrose 50% injection 25 g  25 g Intravenous PRN Aquiles Trimble MD        insulin regular injection 0-8 Units  0-8 Units Intravenous Continuous PRYUE Figueroa  "Aquiles Rodriguez MD         Review of patient's allergies indicates:   Allergen Reactions    Compazine [prochlorperazine edisylate]      "Facial paralysis"    Prochlorperazine Other (See Comments)     Other reaction(s): Not available    "Facial paralysis"      Other reaction(s): Not available    "Facial paralysis"  Other reaction(s): Not available      "Facial paralysis"      Other reaction(s): Not available     Family History   Problem Relation Name Age of Onset    Atrial fibrillation Mother      Thyroid disease Mother      Osteoporosis Mother      Anxiety disorder Mother      Heart disease Father      Anxiety disorder Father      Mental illness Maternal Uncle          schizophrenia     Anxiety disorder Maternal Grandmother      Ulcers Maternal Grandmother      Cancer Paternal Aunt      Diabetes Paternal Aunt       Social History     Socioeconomic History    Marital status:      Spouse name: Mynor   Tobacco Use    Smoking status: Never    Smokeless tobacco: Never   Substance and Sexual Activity    Alcohol use: Not Currently    Drug use: Never     Social Determinants of Health     Financial Resource Strain: Low Risk  (12/29/2023)    Overall Financial Resource Strain (CARDIA)     Difficulty of Paying Living Expenses: Not hard at all   Food Insecurity: No Food Insecurity (12/29/2023)    Hunger Vital Sign     Worried About Running Out of Food in the Last Year: Never true     Ran Out of Food in the Last Year: Never true   Transportation Needs: No Transportation Needs (12/29/2023)    PRAPARE - Transportation     Lack of Transportation (Medical): No     Lack of Transportation (Non-Medical): No   Physical Activity: Unknown (12/29/2023)    Exercise Vital Sign     Days of Exercise per Week: 0 days   Stress: No Stress Concern Present (12/29/2023)    [employer] [place] [employer] - Occupational Stress Questionnaire     Feeling of Stress : Not at all   Housing Stability: Low Risk  (12/29/2023)    Housing " Stability Vital Sign     Unable to Pay for Housing in the Last Year: No     Number of Places Lived in the Last Year: 2     Unstable Housing in the Last Year: No       Review of Systems:  Constitutional:  Denies fever or chills   Eyes:  Denies change in visual acuity   HENT:  Denies nasal congestion or sore throat   Respiratory:  Denies cough or shortness of breath   Cardiovascular:  Denies chest pain or edema   GI:  Denies abdominal pain, nausea, vomiting, bloody stools or diarrhea   :  Denies dysuria   Integument:  Denies rash   Neurologic:  Denies headache, focal weakness or sensory changes   Endocrine:  Denies polyuria or polydipsia   Lymphatic:  Denies swollen glands   Psychiatric:  Denies depression or anxiety     Physical Exam:   Constitutional:  Well developed, well nourished, no acute distress, non-toxic appearance   Integument:  Well hydrated  Neurologic:  Alert & oriented x 3  Psychiatric:  Speech and behavior appropriate     Bilateral Hip Exam    Right Hip Exam   Right hip exam performed same as contralateral hip and is normal.     Left Hip Exam   Tenderness   The patient is experiencing tenderness in the greater trochanter.  Range of Motion   The patient has normal hip ROM.  Muscle Strength   Abduction: 4/5   Other   Erythema: absent  Sensation: normal  Pulse: present            Assessment & plan  Trochanteric bursitis of both hips  -     Large Joint Aspiration/Injection: bilateral greater trochanteric bursa  -     triamcinolone acetonide injection 40 mg  -     triamcinolone acetonide injection 40 mg           Using an aseptic technique, I injected 5 cc of lidocaine 1% without and 1 cc of kenalog 40mg into the bilateral hip. The patient tolerated this well. I will have them return to clinic as needed.

## 2024-07-17 ENCOUNTER — OFFICE VISIT (OUTPATIENT)
Dept: URGENT CARE | Facility: CLINIC | Age: 69
End: 2024-07-17
Payer: MEDICARE

## 2024-07-17 VITALS
BODY MASS INDEX: 42.49 KG/M2 | TEMPERATURE: 98 F | HEART RATE: 81 BPM | WEIGHT: 255 LBS | RESPIRATION RATE: 18 BRPM | SYSTOLIC BLOOD PRESSURE: 175 MMHG | OXYGEN SATURATION: 98 % | DIASTOLIC BLOOD PRESSURE: 87 MMHG | HEIGHT: 65 IN

## 2024-07-17 DIAGNOSIS — J06.9 UPPER RESPIRATORY TRACT INFECTION, UNSPECIFIED TYPE: Primary | ICD-10-CM

## 2024-07-17 LAB
CTP QC/QA: YES
SARS-COV-2 RDRP RESP QL NAA+PROBE: NEGATIVE

## 2024-07-17 PROCEDURE — 99213 OFFICE O/P EST LOW 20 MIN: CPT | Mod: S$GLB,,, | Performed by: NURSE PRACTITIONER

## 2024-07-17 PROCEDURE — 87635 SARS-COV-2 COVID-19 AMP PRB: CPT | Mod: QW,S$GLB,, | Performed by: NURSE PRACTITIONER

## 2024-07-18 NOTE — PROGRESS NOTES
"Subjective:      Patient ID: Melissa Casas is a 68 y.o. female.    Vitals:  height is 5' 5" (1.651 m) and weight is 115.7 kg (255 lb). Her oral temperature is 97.8 °F (36.6 °C). Her blood pressure is 175/87 (abnormal) and her pulse is 81. Her respiration is 18 and oxygen saturation is 98%.     Chief Complaint: Cough    Pt symptoms began today. Pt has nasal congestion, cough, and a headache.     Other  This is a new problem. The current episode started today. The problem has been unchanged. Associated symptoms include congestion and coughing. Nothing aggravates the symptoms. She has tried nothing for the symptoms. The treatment provided no relief.       HENT:  Positive for congestion.    Respiratory:  Positive for cough.       Objective:     Physical Exam   Constitutional: She is oriented to person, place, and time. She appears well-developed. She is cooperative.  Non-toxic appearance. She does not appear ill. No distress.   HENT:   Head: Normocephalic and atraumatic.   Ears:   Right Ear: Hearing, tympanic membrane, external ear and ear canal normal.   Left Ear: Hearing, tympanic membrane, external ear and ear canal normal.   Nose: Nose normal. No mucosal edema, rhinorrhea or nasal deformity. No epistaxis. Right sinus exhibits no maxillary sinus tenderness and no frontal sinus tenderness. Left sinus exhibits no maxillary sinus tenderness and no frontal sinus tenderness.   Mouth/Throat: Uvula is midline, oropharynx is clear and moist and mucous membranes are normal. No trismus in the jaw. Normal dentition. No uvula swelling. No oropharyngeal exudate, posterior oropharyngeal edema or posterior oropharyngeal erythema.   Eyes: Conjunctivae and lids are normal. No scleral icterus.   Neck: Trachea normal and phonation normal. Neck supple. No edema present. No erythema present. No neck rigidity present.   Cardiovascular: Normal rate, regular rhythm, normal heart sounds and normal pulses.   Pulmonary/Chest: Effort normal " and breath sounds normal. No respiratory distress. She has no decreased breath sounds. She has no rhonchi.   Abdominal: Normal appearance.   Musculoskeletal: Normal range of motion.         General: No deformity. Normal range of motion.   Neurological: She is alert and oriented to person, place, and time. She exhibits normal muscle tone. Coordination normal.   Skin: Skin is warm, dry, intact, not diaphoretic and not pale.   Psychiatric: Her speech is normal and behavior is normal. Judgment and thought content normal.   Nursing note and vitals reviewed.      Assessment:     1. Upper respiratory tract infection, unspecified type      Results for orders placed or performed in visit on 07/17/24   POCT COVID-19 Rapid Screening   Result Value Ref Range    POC Rapid COVID Negative Negative     Acceptable Yes          Plan:       Upper respiratory tract infection, unspecified type  -     POCT COVID-19 Rapid Screening      Patient Instructions   INSTRUCTIONS:  - Rest.  - Drink plenty of fluids.  - Take Tylenol and/or Ibuprofen as directed as needed for fever/pain.  Do not take more than the recommended dose.  - follow up with your PCP within the next 1-2 weeks as needed.  - You must understand that you have received an Urgent Care treatment only and that you may be released before all of your medical problems are known or treated.   - You, the patient, will arrange for follow up care as instructed.   - If your condition worsens or fails to improve we recommend that you receive another evaluation at the ER immediately or contact your PCP to discuss your concerns.   - You can call [phone] or [phone] to help schedule an appointment with the appropriate provider.     -If you smoke cigarettes, it would be beneficial for you to stop.      Monitor for new or worsening symptoms    OTC for symptom control    Recommend follow up with PCP/Pediatrician if symptoms are worsening

## 2024-07-18 NOTE — PATIENT INSTRUCTIONS

## 2025-01-02 ENCOUNTER — OFFICE VISIT (OUTPATIENT)
Dept: ORTHOPEDICS | Facility: CLINIC | Age: 70
End: 2025-01-02
Payer: MEDICARE

## 2025-01-02 VITALS — HEIGHT: 65 IN | BODY MASS INDEX: 43.45 KG/M2 | WEIGHT: 260.81 LBS

## 2025-01-02 DIAGNOSIS — M70.61 TROCHANTERIC BURSITIS OF BOTH HIPS: Primary | ICD-10-CM

## 2025-01-02 DIAGNOSIS — M70.62 TROCHANTERIC BURSITIS OF BOTH HIPS: Primary | ICD-10-CM

## 2025-01-02 PROCEDURE — 99999PBSHW PR PBB SHADOW TECHNICAL ONLY FILED TO HB: Mod: PBBFAC,,,

## 2025-01-02 PROCEDURE — 99999 PR PBB SHADOW E&M-EST. PATIENT-LVL IV: CPT | Mod: PBBFAC,,, | Performed by: NURSE PRACTITIONER

## 2025-01-02 PROCEDURE — 20610 DRAIN/INJ JOINT/BURSA W/O US: CPT | Mod: 50,PBBFAC,PO | Performed by: NURSE PRACTITIONER

## 2025-01-02 PROCEDURE — 99214 OFFICE O/P EST MOD 30 MIN: CPT | Mod: PBBFAC,PO | Performed by: NURSE PRACTITIONER

## 2025-01-02 RX ORDER — TRIAMCINOLONE ACETONIDE 40 MG/ML
40 INJECTION, SUSPENSION INTRA-ARTICULAR; INTRAMUSCULAR
Status: DISCONTINUED | OUTPATIENT
Start: 2025-01-02 | End: 2025-01-02 | Stop reason: HOSPADM

## 2025-01-02 RX ADMIN — TRIAMCINOLONE ACETONIDE 40 MG: 40 INJECTION, SUSPENSION INTRA-ARTICULAR; INTRAMUSCULAR at 09:01

## 2025-01-02 NOTE — PROGRESS NOTES
Chief Complaint   Patient presents with    Left Hip - Pain    Right Hip - Pain      HPI:   This is a 69 y.o. who presents to clinic today for bilateral hip pain for the past 2 weeks after no known trauma. Complains of pain while sleeping on side and when descending stairs. Pain is dull and deep. No numbness or tingling. No associated signs or symptoms.      Past Medical History:   Diagnosis Date    Angina pectoris     Anxiety     Aortic valve stenosis     mild-moderate on echo 6/2022    Asthma     CAD (coronary artery disease)     stents; cabg    Chronic diastolic CHF (congestive heart failure)     Chronic pain     Claustrophobia     Coronary artery disease     COVID-19 09/2022    Diverticulitis     Herpes simplex virus (HSV) infection     Hyperlipidemia     Hypothyroidism     Mental disorder     Morbid obesity     Osteopenia     Pneumonia     Primary hypertension     Primary osteoarthritis of left knee 06/24/2021    Restrictive lung disease secondary to obesity 08/29/2022     Past Surgical History:   Procedure Laterality Date    CORONARY ANGIOGRAPHY  12/28/2023    Procedure: Coronary angiogram w/Bypass graft study;  Surgeon: Aquiles Trimble MD;  Location: [facility] CATH;  Service: Cardiovascular;;    CORONARY ANGIOGRAPHY INCLUDING BYPASS GRAFTS WITH CATHETERIZATION OF LEFT HEART N/A 07/11/2022    Procedure: ANGIOGRAM, CORONARY, INCLUDING BYPASS GRAFT, WITH LEFT HEART CATHETERIZATION;  Surgeon: Sophie Martinez MD;  Location: [facility] CATH;  Service: Cardiology;  Laterality: N/A;    CORONARY ANGIOPLASTY WITH STENT PLACEMENT Right     CORONARY ANGIOPLASTY WITH STENT PLACEMENT  4/9/2024    Procedure: IVUS LAD;  Surgeon: Aquiles Trimble MD;  Location: [facility] CATH;  Service: Cardiovascular;;    CORONARY ARTERY BYPASS GRAFT      double     FRACTIONAL FLOW RESERVE MEASUREMENT, MYOCARDIAL  12/28/2023    Procedure: FFR LAD Rm 2324;  Surgeon: Aquiles Trimble MD;  Location: [facility] CATH;  Service:  Cardiovascular;;    JOINT REPLACEMENT Right     total KNEE    JOINT REPLACEMENT Left     patital  KNEE    LEFT HEART CATHETERIZATION  12/28/2023    Procedure: Left heart cath;  Surgeon: Aquiles Trimble MD;  Location: [facility] CATH;  Service: Cardiovascular;;    LITHOTRIPSY, CORONARY TRANSLUMINAL, PERCUTANEOUS  4/9/2024    Procedure: PTCA LAD (Shockwave);  Surgeon: Aquiles Trimble MD;  Location: [facility] CATH;  Service: Cardiovascular;;    PERCUTANEOUS CORONARY INTERVENTION, ARTERY  4/9/2024    Procedure: AIDE LAD;  Surgeon: Aquiles Trimble MD;  Location: [facility] CATH;  Service: Cardiovascular;;     Current Outpatient Medications on File Prior to Visit   Medication Sig Dispense Refill    acetaminophen (TYLENOL) 500 MG tablet Take 500 mg by mouth 2 (two) times a day.      ALPRAZolam (XANAX) 0.5 MG tablet Take 1 tablet (0.5 mg total) by mouth 2 (two) times daily as needed for Anxiety. 60 tablet 0    apixaban (ELIQUIS) 5 mg Tab Take 1 tablet (5 mg total) by mouth 2 (two) times daily. Do not take for 5 days following angiogram 180 tablet 3    ascorbic acid, vitamin C, (VITAMIN C) 500 MG tablet Take 500 mg by mouth once daily.      atorvastatin (LIPITOR) 80 MG tablet Take 1 tablet (80 mg total) by mouth once daily. 90 tablet 3    calcium carbonate (OS-RAY) 600 mg calcium (1,500 mg) Tab Take 600 mg by mouth once daily.      clopidogreL (PLAVIX) 75 mg tablet Take 1 tablet (75 mg total) by mouth once daily. 90 tablet 3    dicyclomine (BENTYL) 10 MG capsule Take 1 capsule (10 mg total) by mouth 3 (three) times daily as needed (cramping). 30 capsule 3    evolocumab (REPATHA SURECLICK) 140 mg/mL PnIj Inject 1 mL (140 mg total) into the skin every 14 (fourteen) days. 6 mL 3    fluticasone propionate (FLONASE) 50 mcg/actuation nasal spray 1 spray (50 mcg total) by Each Nostril route 2 (two) times a day. 48 g 3    fluticasone-salmeterol diskus inhaler 100-50 mcg Inhale 1 puff into the lungs 2 (two) times a  day. Controller 180 each 3    furosemide (LASIX) 20 MG tablet Take 1 tablet (20 mg total) by mouth once daily. 90 tablet 3    latanoprost 0.005 % ophthalmic solution Place into both eyes.      levocetirizine (XYZAL) 5 MG tablet Take 5 mg by mouth every evening.      magnesium oxide 500 mg Tab Take 1 tablet by mouth once daily.      metoprolol succinate (TOPROL-XL) 25 MG 24 hr tablet Take 1 tablet (25 mg total) by mouth once daily. 90 tablet 3    montelukast (SINGULAIR) 10 mg tablet Take 1 tablet (10 mg total) by mouth every evening. 90 tablet 3    multivitamin (THERAGRAN) per tablet Take 1 tablet by mouth once daily.      nitroGLYCERIN 0.4 MG/DOSE TL SPRY (NITROLINGUAL) 400 mcg/spray spray Place 1 spray under the tongue every 5 (five) minutes as needed for Chest pain (for chest ain). 14.1 g 1    nystatin (MYCOSTATIN) cream Apply topically 2 (two) times daily as needed.      propylene glycoL, PF, (SYSTANE COMPLETE PF) 0.6 % Drop Apply 1-2 drops to eye as needed (dry eyes).      ramipriL (ALTACE) 5 MG capsule Take 1 capsule (5 mg total) by mouth once daily. 90 capsule 3    traMADoL (ULTRAM) 50 mg tablet Take 1 tablet (50 mg total) by mouth every 6 (six) hours as needed for Pain. 28 each 0    vitamin D (VITAMIN D3) 1000 units Tab Take 1,000 Units by mouth once daily.      YUVAFEM 10 mcg Tab Place 1 tablet vaginally twice a week.      albuterol (PROVENTIL) 2.5 mg /3 mL (0.083 %) nebulizer solution Take 3 mLs (2.5 mg total) by nebulization every 6 (six) hours as needed for Wheezing. Rescue 360 each 3    levothyroxine (SYNTHROID) 137 MCG Tab tablet Take 1 tablet (137 mcg total) by mouth before breakfast. 90 tablet 0     Current Facility-Administered Medications on File Prior to Visit   Medication Dose Route Frequency Provider Last Rate Last Admin    dextrose 50% injection 12.5 g  12.5 g Intravenous PRN Aquiles Trimble MD        dextrose 50% injection 25 g  25 g Intravenous PRN Aquiles Trimble MD      "   insulin regular injection 0-8 Units  0-8 Units Intravenous Continuous PRN Aquiles Trimble MD         Review of patient's allergies indicates:   Allergen Reactions    Compazine [prochlorperazine edisylate]      "Facial paralysis"    Prochlorperazine Other (See Comments)     Other reaction(s): Not available    "Facial paralysis"      Other reaction(s): Not available    "Facial paralysis"  Other reaction(s): Not available      "Facial paralysis"      Other reaction(s): Not available     Family History   Problem Relation Name Age of Onset    Atrial fibrillation Mother      Thyroid disease Mother      Osteoporosis Mother      Anxiety disorder Mother      Heart disease Father      Anxiety disorder Father      Mental illness Maternal Uncle          schizophrenia     Anxiety disorder Maternal Grandmother      Ulcers Maternal Grandmother      Cancer Paternal Aunt      Diabetes Paternal Aunt       Social History     Socioeconomic History    Marital status:      Spouse name: Mynor   Tobacco Use    Smoking status: Never    Smokeless tobacco: Never   Substance and Sexual Activity    Alcohol use: Not Currently    Drug use: Never     Social Drivers of Health     Financial Resource Strain: Low Risk  (12/29/2023)    Overall Financial Resource Strain (CARDIA)     Difficulty of Paying Living Expenses: Not hard at all   Food Insecurity: No Food Insecurity (12/29/2023)    Hunger Vital Sign     Worried About Running Out of Food in the Last Year: Never true     Ran Out of Food in the Last Year: Never true   Transportation Needs: No Transportation Needs (12/29/2023)    PRAPARE - Transportation     Lack of Transportation (Medical): No     Lack of Transportation (Non-Medical): No   Physical Activity: Unknown (12/29/2023)    Exercise Vital Sign     Days of Exercise per Week: 0 days   Stress: No Stress Concern Present (12/29/2023)    [place] [employer] - Occupational Stress Questionnaire     Feeling of " Stress : Not at all   Housing Stability: Low Risk  (12/29/2023)    Housing Stability Vital Sign     Unable to Pay for Housing in the Last Year: No     Number of Places Lived in the Last Year: 2     Unstable Housing in the Last Year: No       Review of Systems:  Constitutional:  Denies fever or chills   Eyes:  Denies change in visual acuity   HENT:  Denies nasal congestion or sore throat   Respiratory:  Denies cough or shortness of breath   Cardiovascular:  Denies chest pain or edema   GI:  Denies abdominal pain, nausea, vomiting, bloody stools or diarrhea   :  Denies dysuria   Integument:  Denies rash   Neurologic:  Denies headache, focal weakness or sensory changes   Endocrine:  Denies polyuria or polydipsia   Lymphatic:  Denies swollen glands   Psychiatric:  Denies depression or anxiety     Physical Exam:   Constitutional:  Well developed, well nourished, no acute distress, non-toxic appearance   Integument:  Well hydrated  Neurologic:  Alert & oriented x 3  Psychiatric:  Speech and behavior appropriate     Bilateral Hip Exam    Bilateral Hip Exam   Bilateral hip exam performed same as contralateral hip and is normal.     Bilateral Hip Exam   Tenderness   The patient is experiencing tenderness in the greater trochanter.  Range of Motion   The patient has normal hip ROM.  Muscle Strength   Abduction: 4/5   Other   Erythema: absent  Sensation: normal  Pulse: present          Trochanteric bursitis of both hips  -     Large Joint Aspiration/Injection: bilateral greater trochanteric bursa  -     triamcinolone acetonide injection 40 mg  -     triamcinolone acetonide injection 40 mg    Using an aseptic technique, I injected 5 cc of lidocaine 1% without and 1 cc of kenalog 40mg into the bilateral Hip. The patient tolerated this well. I will have them return to clinic in 6 weeks or as needed..

## 2025-01-02 NOTE — PROCEDURES
Large Joint Aspiration/Injection: bilateral greater trochanteric bursa    Date/Time: 1/2/2025 9:20 AM    Performed by: Mary Anne Bowie FNP  Authorized by: Mary Anne Bowie FNP    Consent Done?:  Yes (Verbal)  Indications:  Pain  Timeout: prior to procedure the correct patient, procedure, and site was verified    Prep: patient was prepped and draped in usual sterile fashion      Local anesthesia used?: Yes    Local anesthetic:  Lidocaine 1% without epinephrine  Anesthetic total (ml):  5      Details:  Needle Size:  21 G  Approach:  Lateral  Location:  Hip  Laterality:  Bilateral  Site:  Bilateral greater trochanteric bursa  Medications (Right):  40 mg triamcinolone acetonide 40 mg/mL  Medications (Left):  40 mg triamcinolone acetonide 40 mg/mL  Patient tolerance:  Patient tolerated the procedure well with no immediate complications

## 2025-05-06 ENCOUNTER — OFFICE VISIT (OUTPATIENT)
Dept: ORTHOPEDICS | Facility: CLINIC | Age: 70
End: 2025-05-06
Payer: MEDICARE

## 2025-05-06 VITALS
WEIGHT: 270.06 LBS | DIASTOLIC BLOOD PRESSURE: 86 MMHG | SYSTOLIC BLOOD PRESSURE: 132 MMHG | HEART RATE: 69 BPM | HEIGHT: 65 IN | BODY MASS INDEX: 45 KG/M2

## 2025-05-06 DIAGNOSIS — M70.61 TROCHANTERIC BURSITIS OF BOTH HIPS: Primary | ICD-10-CM

## 2025-05-06 DIAGNOSIS — M70.62 TROCHANTERIC BURSITIS OF BOTH HIPS: Primary | ICD-10-CM

## 2025-05-06 PROCEDURE — 99999PBSHW PR PBB SHADOW TECHNICAL ONLY FILED TO HB: Mod: PBBFAC,,,

## 2025-05-06 PROCEDURE — 99999 PR PBB SHADOW E&M-EST. PATIENT-LVL V: CPT | Mod: PBBFAC,,, | Performed by: NURSE PRACTITIONER

## 2025-05-06 PROCEDURE — 99215 OFFICE O/P EST HI 40 MIN: CPT | Mod: PBBFAC,PO,25 | Performed by: NURSE PRACTITIONER

## 2025-05-06 PROCEDURE — 20610 DRAIN/INJ JOINT/BURSA W/O US: CPT | Mod: 50,PBBFAC,PO | Performed by: NURSE PRACTITIONER

## 2025-05-06 RX ORDER — TRIAMCINOLONE ACETONIDE 40 MG/ML
40 INJECTION, SUSPENSION INTRA-ARTICULAR; INTRAMUSCULAR
Status: DISCONTINUED | OUTPATIENT
Start: 2025-05-06 | End: 2025-05-06 | Stop reason: HOSPADM

## 2025-05-06 RX ADMIN — TRIAMCINOLONE ACETONIDE 40 MG: 40 INJECTION, SUSPENSION INTRA-ARTICULAR; INTRAMUSCULAR at 02:05

## 2025-05-06 NOTE — PROCEDURES
Large Joint Aspiration/Injection: bilateral greater trochanteric bursa    Date/Time: 5/6/2025 2:40 PM    Performed by: Mary Anne Bowie FNP  Authorized by: Mary Anne Bowie FNP    Consent Done?:  Yes (Verbal)  Indications:  Pain  Timeout: prior to procedure the correct patient, procedure, and site was verified    Prep: patient was prepped and draped in usual sterile fashion      Local anesthesia used?: Yes    Local anesthetic:  Lidocaine 1% without epinephrine  Anesthetic total (ml):  5      Details:  Needle Size:  21 G  Approach:  Lateral  Location:  Hip  Laterality:  Bilateral  Site:  Bilateral greater trochanteric bursa  Medications (Right):  40 mg triamcinolone acetonide 40 mg/mL  Medications (Left):  40 mg triamcinolone acetonide 40 mg/mL  Patient tolerance:  Patient tolerated the procedure well with no immediate complications

## 2025-05-06 NOTE — PROGRESS NOTES
Chief Complaint   Patient presents with    Left Hip - Pain    Right Hip - Pain      HPI:   This is a 69 y.o. who presents to clinic today for bilateral hip pain for the past 2 weeks after no known trauma. Complains of pain while sleeping on side and when descending stairs. Pain is dull and deep. No numbness or tingling. No associated signs or symptoms.      Past Medical History:   Diagnosis Date    Angina pectoris     Anxiety     Aortic valve stenosis     mild-moderate on echo 6/2022    Asthma     CAD (coronary artery disease)     stents; cabg    Chronic diastolic CHF (congestive heart failure)     Chronic pain     Claustrophobia     Coronary artery disease     COVID-19 09/2022    Diverticulitis     Herpes simplex virus (HSV) infection     Hyperlipidemia     Hypothyroidism     Mental disorder     Morbid obesity     Osteopenia     Pneumonia     Primary hypertension     Primary osteoarthritis of left knee 06/24/2021    Restrictive lung disease secondary to obesity 08/29/2022     Past Surgical History:   Procedure Laterality Date    CORONARY ANGIOGRAPHY  12/28/2023    Procedure: Coronary angiogram w/Bypass graft study;  Surgeon: Aquiles Trimble MD;  Location: [facility] CATH;  Service: Cardiovascular;;    CORONARY ANGIOGRAPHY INCLUDING BYPASS GRAFTS WITH CATHETERIZATION OF LEFT HEART N/A 07/11/2022    Procedure: ANGIOGRAM, CORONARY, INCLUDING BYPASS GRAFT, WITH LEFT HEART CATHETERIZATION;  Surgeon: Sophie Martinez MD;  Location: [facility] CATH;  Service: Cardiology;  Laterality: N/A;    CORONARY ANGIOPLASTY WITH STENT PLACEMENT Right     CORONARY ANGIOPLASTY WITH STENT PLACEMENT  4/9/2024    Procedure: IVUS LAD;  Surgeon: Aquiles Trimble MD;  Location: [facility] CATH;  Service: Cardiovascular;;    CORONARY ARTERY BYPASS GRAFT      double     FRACTIONAL FLOW RESERVE MEASUREMENT, MYOCARDIAL  12/28/2023    Procedure: FFR LAD Rm 2324;  Surgeon: Aquiles Trimble MD;  Location: [facility] CATH;  Service:  "Cardiovascular;;    JOINT REPLACEMENT Right     total KNEE    JOINT REPLACEMENT Left     patital  KNEE    LEFT HEART CATHETERIZATION  12/28/2023    Procedure: Left heart cath;  Surgeon: Aquiles Trimble MD;  Location: [facility] CATH;  Service: Cardiovascular;;    LITHOTRIPSY, CORONARY TRANSLUMINAL, PERCUTANEOUS  4/9/2024    Procedure: PTCA LAD (Shockwave);  Surgeon: Aquiles Trimble MD;  Location: [facility] CATH;  Service: Cardiovascular;;    PERCUTANEOUS CORONARY INTERVENTION, ARTERY  4/9/2024    Procedure: AIDE LAD;  Surgeon: Aquiles Trimble MD;  Location: [facility] CATH;  Service: Cardiovascular;;     Medications Ordered Prior to Encounter[1]  Review of patient's allergies indicates:   Allergen Reactions    Compazine [prochlorperazine edisylate]      "Facial paralysis"    Prochlorperazine Other (See Comments)     Other reaction(s): Not available    "Facial paralysis"      Other reaction(s): Not available    "Facial paralysis"  Other reaction(s): Not available      "Facial paralysis"      Other reaction(s): Not available     Family History   Problem Relation Name Age of Onset    Atrial fibrillation Mother      Thyroid disease Mother      Osteoporosis Mother      Anxiety disorder Mother      Heart disease Father      Anxiety disorder Father      Mental illness Maternal Uncle          schizophrenia     Anxiety disorder Maternal Grandmother      Ulcers Maternal Grandmother      Cancer Paternal Aunt      Diabetes Paternal Aunt       Social History[2]    Review of Systems:  Constitutional:  Denies fever or chills   Eyes:  Denies change in visual acuity   HENT:  Denies nasal congestion or sore throat   Respiratory:  Denies cough or shortness of breath   Cardiovascular:  Denies chest pain or edema   GI:  Denies abdominal pain, nausea, vomiting, bloody stools or diarrhea   :  Denies dysuria   Integument:  Denies rash   Neurologic:  Denies headache, focal weakness or sensory changes   Endocrine:  Denies " polyuria or polydipsia   Lymphatic:  Denies swollen glands   Psychiatric:  Denies depression or anxiety     Physical Exam:   Constitutional:  Well developed, well nourished, no acute distress, non-toxic appearance   Integument:  Well hydrated  Neurologic:  Alert & oriented x 3  Psychiatric:  Speech and behavior appropriate     Bilateral Hip Exam    Bilateral Hip Exam   Tenderness   The patient is experiencing tenderness in the greater trochanter.  Range of Motion   The patient has normal hip ROM.  Muscle Strength   Abduction: 4/5   Other   Erythema: absent  Sensation: normal  Pulse: present          Trochanteric bursitis of both hips  -     Large Joint Aspiration/Injection: bilateral greater trochanteric bursa  -     triamcinolone acetonide injection 40 mg  -     triamcinolone acetonide injection 40 mg    Using an aseptic technique, I injected 5 cc of lidocaine 1% without and 1 cc of kenalog 40mg into the bilateral Hip. The patient tolerated this well. I will have them return to clinic            [1]   Current Outpatient Medications on File Prior to Visit   Medication Sig Dispense Refill    acetaminophen (TYLENOL) 500 MG tablet Take 500 mg by mouth 2 (two) times a day.      albuterol (PROVENTIL/VENTOLIN HFA) 90 mcg/actuation inhaler Inhale 2 puffs into the lungs every 6 (six) hours as needed for Wheezing. Rescue 54 g 3    ALPRAZolam (XANAX) 0.5 MG tablet Take 1 tablet (0.5 mg total) by mouth 2 (two) times daily as needed for Anxiety. 60 tablet 0    apixaban (ELIQUIS) 5 mg Tab Take 1 tablet (5 mg total) by mouth 2 (two) times daily. Do not take for 5 days following angiogram 180 tablet 3    ascorbic acid, vitamin C, (VITAMIN C) 500 MG tablet Take 500 mg by mouth once daily.      aspirin (ECOTRIN) 81 MG EC tablet Take 1 tablet (81 mg total) by mouth once daily.      atorvastatin (LIPITOR) 80 MG tablet Take 1 tablet (80 mg total) by mouth once daily. 90 tablet 3    calcium carbonate (OS-RAY) 600 mg calcium (1,500 mg)  Tab Take 600 mg by mouth once daily.      dicyclomine (BENTYL) 10 MG capsule Take 1 capsule (10 mg total) by mouth 3 (three) times daily as needed (cramping). 30 capsule 3    evolocumab (REPATHA SURECLICK) 140 mg/mL PnIj Inject 1 mL (140 mg total) into the skin every 14 (fourteen) days. 6 mL 6    fluticasone propionate (FLONASE) 50 mcg/actuation nasal spray 1 spray (50 mcg total) by Each Nostril route 2 (two) times a day. 48 g 3    fluticasone-salmeterol diskus inhaler 100-50 mcg Inhale 1 puff into the lungs 2 (two) times a day. Controller 180 each 3    latanoprost 0.005 % ophthalmic solution Place into both eyes.      levocetirizine (XYZAL) 5 MG tablet Take 5 mg by mouth every evening.      levothyroxine (SYNTHROID) 137 MCG Tab tablet Take 1 tablet (137 mcg total) by mouth before breakfast. 90 tablet 0    magnesium 200 mg Tab Take 200 mg by mouth once. Pt takes 50 mg daily      metoprolol succinate (TOPROL-XL) 25 MG 24 hr tablet Take 1 tablet (25 mg total) by mouth once daily. 90 tablet 3    montelukast (SINGULAIR) 10 mg tablet Take 1 tablet (10 mg total) by mouth every evening. 90 tablet 3    multivitamin (THERAGRAN) per tablet Take 1 tablet by mouth once daily.      nitroGLYCERIN 0.4 MG/DOSE TL SPRY (NITROLINGUAL) 400 mcg/spray spray Place 1 spray under the tongue every 5 (five) minutes as needed for Chest pain (for chest ain). 14.1 g 1    nystatin (MYCOSTATIN) cream Apply topically 2 (two) times daily as needed.      ondansetron (ZOFRAN-ODT) 4 MG TbDL Take 1 tablet (4 mg total) by mouth every 6 (six) hours as needed. 12 tablet 0    propylene glycoL, PF, (SYSTANE COMPLETE PF) 0.6 % Drop Apply 1-2 drops to eye as needed (dry eyes).      ramipriL (ALTACE) 5 MG capsule Take 1 capsule (5 mg total) by mouth once daily. 90 capsule 3    semaglutide, weight loss, (WEGOVY) 0.25 mg/0.5 mL PnIj Inject 0.25 mg into the skin once a week. 4 mL 1    traMADoL (ULTRAM) 50 mg tablet Take 1 tablet (50 mg total) by mouth every 6  (six) hours as needed for Pain. 28 each 0    vitamin D (VITAMIN D3) 1000 units Tab Take 1,000 Units by mouth once daily.      YUVAFEM 10 mcg Tab Place 1 tablet vaginally twice a week.      furosemide (LASIX) 20 MG tablet Take 1 tablet (20 mg total) by mouth once daily. 90 tablet 3     Current Facility-Administered Medications on File Prior to Visit   Medication Dose Route Frequency Provider Last Rate Last Admin    dextrose 50% injection 12.5 g  12.5 g Intravenous PRN Aquiles Trimble MD        dextrose 50% injection 25 g  25 g Intravenous PRN Aquiles Trimble MD        insulin regular injection 0-8 Units  0-8 Units Intravenous Continuous PRN Aquiles Trimble MD       [2]   Social History  Socioeconomic History    Marital status:      Spouse name: Mynor   Tobacco Use    Smoking status: Never    Smokeless tobacco: Never   Substance and Sexual Activity    Alcohol use: Not Currently    Drug use: Never     Social Drivers of Health     Financial Resource Strain: Low Risk  (3/31/2025)    Overall Financial Resource Strain (CARDIA)     Difficulty of Paying Living Expenses: Not hard at all   Food Insecurity: No Food Insecurity (3/31/2025)    Hunger Vital Sign     Worried About Running Out of Food in the Last Year: Never true     Ran Out of Food in the Last Year: Never true   Transportation Needs: No Transportation Needs (3/31/2025)    PRAPARE - Transportation     Lack of Transportation (Medical): No     Lack of Transportation (Non-Medical): No   Physical Activity: Inactive (3/31/2025)    Exercise Vital Sign     Days of Exercise per Week: 0 days     Minutes of Exercise per Session: 0 min   Stress: No Stress Concern Present (3/31/2025)    [employer] - Occupational Stress Questionnaire     Feeling of Stress : Only a little   Housing Stability: Low Risk  (3/31/2025)    Housing Stability Vital Sign     Unable to Pay for Housing in the Last Year: No     Number of  Times Moved in the Last Year: 0     Homeless in the Last Year: No